# Patient Record
Sex: FEMALE | Race: WHITE | Employment: FULL TIME | URBAN - METROPOLITAN AREA
[De-identification: names, ages, dates, MRNs, and addresses within clinical notes are randomized per-mention and may not be internally consistent; named-entity substitution may affect disease eponyms.]

---

## 2017-12-08 ENCOUNTER — OFFICE VISIT (OUTPATIENT)
Dept: CARDIOLOGY CLINIC | Age: 67
End: 2017-12-08

## 2017-12-08 VITALS
HEART RATE: 80 BPM | HEIGHT: 63 IN | WEIGHT: 160.2 LBS | BODY MASS INDEX: 28.39 KG/M2 | DIASTOLIC BLOOD PRESSURE: 88 MMHG | SYSTOLIC BLOOD PRESSURE: 142 MMHG

## 2017-12-08 DIAGNOSIS — R01.1 MURMUR: Primary | ICD-10-CM

## 2017-12-08 PROCEDURE — 1090F PRES/ABSN URINE INCON ASSESS: CPT | Performed by: INTERNAL MEDICINE

## 2017-12-08 PROCEDURE — 4040F PNEUMOC VAC/ADMIN/RCVD: CPT | Performed by: INTERNAL MEDICINE

## 2017-12-08 PROCEDURE — G8400 PT W/DXA NO RESULTS DOC: HCPCS | Performed by: INTERNAL MEDICINE

## 2017-12-08 PROCEDURE — 99213 OFFICE O/P EST LOW 20 MIN: CPT | Performed by: INTERNAL MEDICINE

## 2017-12-08 PROCEDURE — 3017F COLORECTAL CA SCREEN DOC REV: CPT | Performed by: INTERNAL MEDICINE

## 2017-12-08 PROCEDURE — 1036F TOBACCO NON-USER: CPT | Performed by: INTERNAL MEDICINE

## 2017-12-08 PROCEDURE — 3014F SCREEN MAMMO DOC REV: CPT | Performed by: INTERNAL MEDICINE

## 2017-12-08 PROCEDURE — G8427 DOCREV CUR MEDS BY ELIG CLIN: HCPCS | Performed by: INTERNAL MEDICINE

## 2017-12-08 PROCEDURE — G8419 CALC BMI OUT NRM PARAM NOF/U: HCPCS | Performed by: INTERNAL MEDICINE

## 2017-12-08 PROCEDURE — 1123F ACP DISCUSS/DSCN MKR DOCD: CPT | Performed by: INTERNAL MEDICINE

## 2017-12-08 PROCEDURE — G8484 FLU IMMUNIZE NO ADMIN: HCPCS | Performed by: INTERNAL MEDICINE

## 2017-12-08 NOTE — PROGRESS NOTES
CARDIOLOGY NOTE      12/8/2017    RE: Marcello Jennings  (1950)                               TO:  Dr. Laina Wilcox MD      Thank you for involving me in taking care of your  patient Marcello Jennings, who is a  78y.o. year old      female with past medical  history of  Htn, hyperlipidimea  is  seen today Patient  during this  visit has no cardiac complains. Vitals:    12/08/17 1133   BP: (!) 142/88   Pulse:        Current Outpatient Prescriptions   Medication Sig Dispense Refill    levothyroxine (SYNTHROID) 150 MCG tablet TAKE ONE TABLET DAILY BY MOUTH  3    omeprazole (PRILOSEC) 40 MG delayed release capsule TAKE 1 CAPSULE BY ORAL ROUTE EVERY DAY BEFORE A MEAL  3    triamterene-hydrochlorothiazide (DYAZIDE) 37.5-25 MG per capsule daily.  MULTIPLE VITAMINS PO Take  by mouth daily.  latanoprost (XALATAN) 0.005 % ophthalmic solution 1 drop nightly.  timolol (TIMOPTIC) 0.5 % ophthalmic solution Place 1 drop into both eyes nightly.  levocetirizine (XYZAL) 5 MG tablet Take 5 mg by mouth nightly. No current facility-administered medications for this visit. Allergies: Macrobid [nitrofurantoin monohyd macro];  Naproxen; Codeine; Sulfamethazine; and Trimethoprim  Past Medical History:   Diagnosis Date    Allergic rhinitis     Breast cancer ([facility] [address] 11/2005, 10/2003, 1/18/2002    Depression     Family history of coronary artery disease     Fibroids 1984    heavy periods with fibroids    Frozen shoulder 12/12/08    left    GERD (gastroesophageal reflux disease)     H/O cardiovascular stress test 12/3/2015    treadmill    H/O chest x-ray 5/17/07    H/O echocardiogram 3/9/56    mild diastolic dysfunction    Hx of  Carotid Doppler ultrasound 12/20/2016    Normal echo    Hyperlipidemia     Hypertension     Hypothyroidism     VHD (valvular heart disease)     Mitral valve disease     Past Surgical History:   Procedure Laterality Date    BREAST LUMPECTOMY  , 1/18/02 left, 10/27/06    BREAST SURGERY  6/9/2006    b/l reconstruction    CHOLECYSTECTOMY  10/24/2007    HYSTERECTOMY  1984    total    MASTECTOMY  11/2005    B/L    OTHER SURGICAL HISTORY  12/12/08    arthroscopy    THYROIDECTOMY       Family History   Problem Relation Age of Onset    Stroke Mother     Heart Surgery Father 67     CABG    Heart Attack Father     Hypertension Father     High Cholesterol Father     High Cholesterol Brother     Hypertension Brother     Heart Attack Paternal Uncle     Heart Attack Maternal Grandfather     Heart Attack Paternal Uncle     Heart Attack Paternal Uncle     Heart Attack Paternal Uncle      Social History   Substance Use Topics    Smoking status: Never Smoker    Smokeless tobacco: Never Used    Alcohol use No          Review of systems:  HEENT: Neg  Card:neg   GI;Neg  : Neg  Neuro: Neg  Psych: Neg  Derm: Neg  MS; Neg  All: Documented  Constitutional: Neg    Objective:      Physical Exam:  BP (!) 142/88   Pulse 80   Ht 5' 3\" (1.6 m)   Wt 160 lb 3.2 oz (72.7 kg)   BMI 28.38 kg/m²   Wt Readings from Last 3 Encounters:   12/08/17 160 lb 3.2 oz (72.7 kg)   11/23/16 157 lb 10.1 oz (71.5 kg)   12/03/15 161 lb (73 kg)     Body mass index is 28.38 kg/m². GENERAL - Alert, oriented, pleasant, in no apparent distress. Head unremarkable  Eyes  Not injected conjunctiva  ENT  normal mucosa  Neck - Supple. No jugular venous distention noted. No carotid bruits. Cardiovascular  Normal S1 and S2 without obvious murmur or gallop. Extremities - No cyanosis, clubbing, or significant edema. Pulmonary  No respiratory distress. No wheezes or rales. Pulses: Bilateral radial and pedal pulses normal  Abdomen  no tenderness  Musculoskeletal  normal strength  Neurologic    There are  no gross focal neurologic abnormalities.   Skin-  No rash  Affect; normal mood    DATA:  No results found for: CKTOTAL, CKMB, CKMBINDEX, TROPONINI  BNP:  No results found for: BNP  PT/INR:  No results found for: PTINR  No results found for: LABA1C  Lab Results   Component Value Date    CHOL 234 07/08/2015    TRIG 269 07/08/2015    HDL 35 07/08/2015    LDLCALC 145 07/08/2015     Lab Results   Component Value Date    ALT 46 (H) 07/07/2013    AST 31 07/07/2013     TSH:    Lab Results   Component Value Date    TSH 0.61 09/13/2012         QUALITY MEASURES:  CAD:  No   CHOL LOWERING:  No- if No Why  ANTIPLATELET:  Yes and No - if No why  BETA BLOCKER    no  IF  NO WHY  SMOKING HISTORY no COUNSELLED no  ATRIAL FIBRILLATIONno ANTICOAG: no    Assessment/ Plan:     Patient seen , interviewed and examined      -  Hypertension: Patients blood pressure is normal. Patient is advised about low sodium diet. Present medical regimen will not be changed. -  LIPID MANAGEMENT:  Available lipid  lab data reviewed  and patient was given dietary advice. NCEP- ATP III guidelines reviewed with patient.     -   Changes  in medicines made: No        - VHD     stable      Mod MR yearly echo

## 2018-01-29 LAB
ALBUMIN SERPL-MCNC: 4.7 G/DL
ALP BLD-CCNC: 75 U/L
ALT SERPL-CCNC: 67 U/L
ANION GAP SERPL CALCULATED.3IONS-SCNC: NORMAL MMOL/L
AST SERPL-CCNC: 48 U/L
AVERAGE GLUCOSE: NORMAL
BILIRUB SERPL-MCNC: 0.3 MG/DL (ref 0.1–1.4)
BUN BLDV-MCNC: 14 MG/DL
CALCIUM SERPL-MCNC: 9.6 MG/DL
CHLORIDE BLD-SCNC: 101 MMOL/L
CHOLESTEROL, TOTAL: 220 MG/DL
CHOLESTEROL/HDL RATIO: NORMAL
CO2: 28 MMOL/L
CREAT SERPL-MCNC: 0.6 MG/DL
GFR CALCULATED: NORMAL
GLUCOSE BLD-MCNC: 126 MG/DL
HBA1C MFR BLD: 5.9 %
HDLC SERPL-MCNC: 35 MG/DL (ref 35–70)
LDL CHOLESTEROL CALCULATED: 126 MG/DL (ref 0–160)
POTASSIUM SERPL-SCNC: 3.8 MMOL/L
SODIUM BLD-SCNC: 143 MMOL/L
T4 FREE: 1.27
TOTAL PROTEIN: 7.3
TRIGL SERPL-MCNC: 293 MG/DL
TSH SERPL DL<=0.05 MIU/L-ACNC: 0.32 UIU/ML
VLDLC SERPL CALC-MCNC: 59 MG/DL

## 2018-02-13 ENCOUNTER — TELEPHONE (OUTPATIENT)
Dept: CARDIOLOGY CLINIC | Age: 68
End: 2018-02-13

## 2018-02-13 NOTE — TELEPHONE ENCOUNTER
Patient advised that based on her lipids, Dr. Felipe Dallas would like her to start on Pravachol 20 mg. She states that she has an opinion about statins and that she is not putting them into her body. Advised that this RN will document this in her chart and she could discuss with Dr. Felipe Dallas and Dr. Vivian Grubbs at her OV's. She voiced understanding.

## 2018-09-18 LAB
CHOLESTEROL, TOTAL: 243 MG/DL
CHOLESTEROL/HDL RATIO: NORMAL
HDLC SERPL-MCNC: 40 MG/DL (ref 35–70)
LDL CHOLESTEROL CALCULATED: 142 MG/DL (ref 0–160)
TRIGL SERPL-MCNC: 306 MG/DL
VLDLC SERPL CALC-MCNC: 61 MG/DL

## 2018-09-26 ENCOUNTER — TELEPHONE (OUTPATIENT)
Dept: CARDIOLOGY CLINIC | Age: 68
End: 2018-09-26

## 2018-09-27 ENCOUNTER — TELEPHONE (OUTPATIENT)
Dept: CARDIOLOGY CLINIC | Age: 68
End: 2018-09-27

## 2018-12-07 ENCOUNTER — OFFICE VISIT (OUTPATIENT)
Dept: CARDIOLOGY CLINIC | Age: 68
End: 2018-12-07
Payer: MEDICARE

## 2018-12-07 VITALS
BODY MASS INDEX: 26.93 KG/M2 | HEIGHT: 63 IN | DIASTOLIC BLOOD PRESSURE: 88 MMHG | SYSTOLIC BLOOD PRESSURE: 132 MMHG | WEIGHT: 152 LBS | HEART RATE: 70 BPM

## 2018-12-07 DIAGNOSIS — R94.31 ABNORMAL EKG: ICD-10-CM

## 2018-12-07 DIAGNOSIS — E78.2 MIXED HYPERLIPIDEMIA: Primary | ICD-10-CM

## 2018-12-07 DIAGNOSIS — I10 ESSENTIAL HYPERTENSION: ICD-10-CM

## 2018-12-07 PROCEDURE — G8419 CALC BMI OUT NRM PARAM NOF/U: HCPCS | Performed by: INTERNAL MEDICINE

## 2018-12-07 PROCEDURE — 99213 OFFICE O/P EST LOW 20 MIN: CPT | Performed by: INTERNAL MEDICINE

## 2018-12-07 PROCEDURE — 1101F PT FALLS ASSESS-DOCD LE1/YR: CPT | Performed by: INTERNAL MEDICINE

## 2018-12-07 PROCEDURE — 1123F ACP DISCUSS/DSCN MKR DOCD: CPT | Performed by: INTERNAL MEDICINE

## 2018-12-07 PROCEDURE — G8484 FLU IMMUNIZE NO ADMIN: HCPCS | Performed by: INTERNAL MEDICINE

## 2018-12-07 PROCEDURE — 1090F PRES/ABSN URINE INCON ASSESS: CPT | Performed by: INTERNAL MEDICINE

## 2018-12-07 PROCEDURE — 1036F TOBACCO NON-USER: CPT | Performed by: INTERNAL MEDICINE

## 2018-12-07 PROCEDURE — G8428 CUR MEDS NOT DOCUMENT: HCPCS | Performed by: INTERNAL MEDICINE

## 2018-12-07 PROCEDURE — 3017F COLORECTAL CA SCREEN DOC REV: CPT | Performed by: INTERNAL MEDICINE

## 2018-12-07 PROCEDURE — 4040F PNEUMOC VAC/ADMIN/RCVD: CPT | Performed by: INTERNAL MEDICINE

## 2018-12-07 PROCEDURE — G8400 PT W/DXA NO RESULTS DOC: HCPCS | Performed by: INTERNAL MEDICINE

## 2018-12-07 NOTE — PROGRESS NOTES
disease     Fibroids 1984    heavy periods with fibroids    Frozen shoulder 12/12/08    left    GERD (gastroesophageal reflux disease)     H/O cardiovascular stress test 12/3/2015    treadmill    H/O chest x-ray 5/17/07    H/O echocardiogram 5/6/01    mild diastolic dysfunction    Hx of  Carotid Doppler ultrasound 12/20/2016    Normal echo    Hyperlipidemia     Hypertension     Hypothyroidism     VHD (valvular heart disease)     Mitral valve disease     Past Surgical History:   Procedure Laterality Date    BREAST LUMPECTOMY  , 1/18/02    left, 10/27/06    BREAST SURGERY  6/9/2006    b/l reconstruction    CHOLECYSTECTOMY  10/24/2007    HYSTERECTOMY  1984    total    MASTECTOMY  11/2005    B/L    OTHER SURGICAL HISTORY  12/12/08    arthroscopy    THYROIDECTOMY        As reviewed   Family History   Problem Relation Age of Onset    Stroke Mother     Heart Surgery Father 67        CABG    Heart Attack Father     Hypertension Father     High Cholesterol Father     High Cholesterol Brother     Hypertension Brother     Heart Attack Paternal Uncle     Heart Attack Maternal Grandfather     Heart Attack Paternal Uncle     Heart Attack Paternal Uncle     Heart Attack Paternal Uncle      Social History   Substance Use Topics    Smoking status: Never Smoker    Smokeless tobacco: Never Used    Alcohol use No      Review of Systems:    Constitutional: Negative for diaphoresis and fatigue  Psychological:Negative for anxiety or depression  HENT: Negative for headaches, nasal congestion, sinus pain or vertigo  Eyes: Negative for visual disturbance.    Endocrine: Negative for polydipsia/polyuria  Respiratory: Negative for shortness of breath  Cardiovascular: Negative for chest pain, dyspnea on exertion, claudication, edema, irregular heartbeat, murmur, palpitations or shortness of breath  Gastrointestinal: Negative for abdominal pain or heartburn  Genito-Urinary: Negative for urinary

## 2018-12-11 ENCOUNTER — PROCEDURE VISIT (OUTPATIENT)
Dept: CARDIOLOGY CLINIC | Age: 68
End: 2018-12-11
Payer: MEDICARE

## 2018-12-11 VITALS
HEIGHT: 63 IN | BODY MASS INDEX: 26.93 KG/M2 | SYSTOLIC BLOOD PRESSURE: 136 MMHG | HEART RATE: 88 BPM | WEIGHT: 152 LBS | DIASTOLIC BLOOD PRESSURE: 78 MMHG

## 2018-12-11 DIAGNOSIS — I10 ESSENTIAL HYPERTENSION: Primary | ICD-10-CM

## 2018-12-11 DIAGNOSIS — E78.2 MIXED HYPERLIPIDEMIA: ICD-10-CM

## 2018-12-11 DIAGNOSIS — I38 VHD (VALVULAR HEART DISEASE): ICD-10-CM

## 2018-12-11 DIAGNOSIS — R94.31 ABNORMAL EKG: ICD-10-CM

## 2018-12-11 PROCEDURE — 93015 CV STRESS TEST SUPVJ I&R: CPT | Performed by: INTERNAL MEDICINE

## 2019-08-23 LAB
CHOLESTEROL, TOTAL: 211 MG/DL
CHOLESTEROL/HDL RATIO: NORMAL
HDLC SERPL-MCNC: 36 MG/DL (ref 35–70)
LDL CHOLESTEROL CALCULATED: 129 MG/DL (ref 0–160)
TRIGL SERPL-MCNC: 230 MG/DL
VLDLC SERPL CALC-MCNC: 46 MG/DL

## 2019-12-10 ENCOUNTER — OFFICE VISIT (OUTPATIENT)
Dept: CARDIOLOGY CLINIC | Age: 69
End: 2019-12-10
Payer: MEDICARE

## 2019-12-10 VITALS
BODY MASS INDEX: 25.83 KG/M2 | DIASTOLIC BLOOD PRESSURE: 78 MMHG | SYSTOLIC BLOOD PRESSURE: 130 MMHG | HEART RATE: 74 BPM | WEIGHT: 145.8 LBS | HEIGHT: 63 IN

## 2019-12-10 DIAGNOSIS — I38 VHD (VALVULAR HEART DISEASE): Primary | ICD-10-CM

## 2019-12-10 DIAGNOSIS — R07.9 CHEST PAIN, UNSPECIFIED TYPE: ICD-10-CM

## 2019-12-10 PROCEDURE — 3017F COLORECTAL CA SCREEN DOC REV: CPT | Performed by: INTERNAL MEDICINE

## 2019-12-10 PROCEDURE — G8417 CALC BMI ABV UP PARAM F/U: HCPCS | Performed by: INTERNAL MEDICINE

## 2019-12-10 PROCEDURE — 1123F ACP DISCUSS/DSCN MKR DOCD: CPT | Performed by: INTERNAL MEDICINE

## 2019-12-10 PROCEDURE — G8400 PT W/DXA NO RESULTS DOC: HCPCS | Performed by: INTERNAL MEDICINE

## 2019-12-10 PROCEDURE — 1090F PRES/ABSN URINE INCON ASSESS: CPT | Performed by: INTERNAL MEDICINE

## 2019-12-10 PROCEDURE — 99214 OFFICE O/P EST MOD 30 MIN: CPT | Performed by: INTERNAL MEDICINE

## 2019-12-10 PROCEDURE — 4040F PNEUMOC VAC/ADMIN/RCVD: CPT | Performed by: INTERNAL MEDICINE

## 2019-12-10 PROCEDURE — 1036F TOBACCO NON-USER: CPT | Performed by: INTERNAL MEDICINE

## 2019-12-10 PROCEDURE — G8484 FLU IMMUNIZE NO ADMIN: HCPCS | Performed by: INTERNAL MEDICINE

## 2019-12-10 PROCEDURE — G8427 DOCREV CUR MEDS BY ELIG CLIN: HCPCS | Performed by: INTERNAL MEDICINE

## 2019-12-10 RX ORDER — DORZOLAMIDE HCL 20 MG/ML
1 SOLUTION/ DROPS OPHTHALMIC 2 TIMES DAILY
COMMUNITY

## 2019-12-11 ENCOUNTER — TELEPHONE (OUTPATIENT)
Dept: CARDIOLOGY CLINIC | Age: 69
End: 2019-12-11

## 2019-12-12 ENCOUNTER — TELEPHONE (OUTPATIENT)
Dept: CARDIOLOGY CLINIC | Age: 69
End: 2019-12-12

## 2019-12-27 ENCOUNTER — PROCEDURE VISIT (OUTPATIENT)
Dept: CARDIOLOGY CLINIC | Age: 69
End: 2019-12-27
Payer: MEDICARE

## 2019-12-27 VITALS
HEART RATE: 73 BPM | BODY MASS INDEX: 25.69 KG/M2 | SYSTOLIC BLOOD PRESSURE: 130 MMHG | DIASTOLIC BLOOD PRESSURE: 68 MMHG | WEIGHT: 145 LBS | HEIGHT: 63 IN

## 2019-12-27 DIAGNOSIS — I38 VHD (VALVULAR HEART DISEASE): ICD-10-CM

## 2019-12-27 DIAGNOSIS — R94.31 ABNORMAL EKG: ICD-10-CM

## 2019-12-27 DIAGNOSIS — R07.9 CHEST PAIN, UNSPECIFIED TYPE: Primary | ICD-10-CM

## 2019-12-27 DIAGNOSIS — I05.9 MITRAL VALVE DISEASE: ICD-10-CM

## 2019-12-27 DIAGNOSIS — Z82.49 FAMILY HISTORY OF CORONARY ARTERY DISEASE: ICD-10-CM

## 2019-12-27 LAB
LV EF: 58 %
LVEF MODALITY: NORMAL

## 2019-12-27 PROCEDURE — 93306 TTE W/DOPPLER COMPLETE: CPT | Performed by: INTERNAL MEDICINE

## 2019-12-27 PROCEDURE — 93015 CV STRESS TEST SUPVJ I&R: CPT | Performed by: INTERNAL MEDICINE

## 2019-12-30 ENCOUNTER — TELEPHONE (OUTPATIENT)
Dept: CARDIOLOGY CLINIC | Age: 69
End: 2019-12-30

## 2019-12-30 NOTE — TELEPHONE ENCOUNTER
LV function and size are normal, Ejection Fraction 55-60 %. Normal left ventricular wall thickness. No regional wall motion abnormalities were detected. Normal diastolic filling pattern for age. All chamber dimensions are within normal limits. Mild aortic regurgitation is noted with a pressure half time of 557 msec. Mild mitral and tricuspid regurgitation is present. RVSP= 29 mmHg. No evidence of pericardial effusion. ECG portion of stress test is negative for ischemia by diagnostic criteria. EKG showed frequent PAC during stress at Peak exercise No chest pain noted   during testing. Completed 7METS and 6 Mins of exercise .    Harris Score is 6 ( LOW RISK) but heavy PAC burden seen in stress

## 2019-12-31 ENCOUNTER — TELEPHONE (OUTPATIENT)
Dept: CARDIOLOGY CLINIC | Age: 69
End: 2019-12-31

## 2019-12-31 NOTE — TELEPHONE ENCOUNTER
LM on  for patient to return call to discuss results. LV function and size are normal, Ejection Fraction 55-60 %. Normal left ventricular wall thickness. No regional wall motion abnormalities were detected. Normal diastolic filling pattern for age. All chamber dimensions are within normal limits. Mild aortic regurgitation is noted with a pressure half time of 557 msec. Mild mitral and tricuspid regurgitation is present. RVSP= 29 mmHg. No evidence of pericardial effusion. ECG portion of stress test is negative for ischemia by diagnostic criteria. EKG showed frequent PAC during stress at Peak exercise No chest pain noted   during testing. Completed 7METS and 6 Mins of exercise .    Harris Score is 6 ( LOW RISK) but heavy PAC burden seen in stress

## 2020-01-06 ENCOUNTER — TELEPHONE (OUTPATIENT)
Dept: CARDIOLOGY CLINIC | Age: 70
End: 2020-01-06

## 2020-01-06 NOTE — TELEPHONE ENCOUNTER
Advised patient of results. Patient coming in for f/u ov with Juanito, due to her having a lot of questions. Specifically about if breast cancer radiation is affecting her heart or thickening her heart muscle. Also she has questions about thoracic output syndrome. She states she has had some tingling and discoloration in her fingers, but no other symptoms. She is going to a chiropractor as well.

## 2020-01-16 ENCOUNTER — OFFICE VISIT (OUTPATIENT)
Dept: CARDIOLOGY CLINIC | Age: 70
End: 2020-01-16
Payer: MEDICARE

## 2020-01-16 VITALS
BODY MASS INDEX: 25.91 KG/M2 | HEART RATE: 76 BPM | HEIGHT: 63 IN | SYSTOLIC BLOOD PRESSURE: 132 MMHG | DIASTOLIC BLOOD PRESSURE: 84 MMHG | WEIGHT: 146.2 LBS

## 2020-01-16 PROCEDURE — G8484 FLU IMMUNIZE NO ADMIN: HCPCS | Performed by: INTERNAL MEDICINE

## 2020-01-16 PROCEDURE — 99213 OFFICE O/P EST LOW 20 MIN: CPT | Performed by: INTERNAL MEDICINE

## 2020-01-16 PROCEDURE — 3017F COLORECTAL CA SCREEN DOC REV: CPT | Performed by: INTERNAL MEDICINE

## 2020-01-16 PROCEDURE — G8417 CALC BMI ABV UP PARAM F/U: HCPCS | Performed by: INTERNAL MEDICINE

## 2020-01-16 PROCEDURE — G8400 PT W/DXA NO RESULTS DOC: HCPCS | Performed by: INTERNAL MEDICINE

## 2020-01-16 PROCEDURE — 1090F PRES/ABSN URINE INCON ASSESS: CPT | Performed by: INTERNAL MEDICINE

## 2020-01-16 PROCEDURE — 1123F ACP DISCUSS/DSCN MKR DOCD: CPT | Performed by: INTERNAL MEDICINE

## 2020-01-16 PROCEDURE — G8427 DOCREV CUR MEDS BY ELIG CLIN: HCPCS | Performed by: INTERNAL MEDICINE

## 2020-01-16 PROCEDURE — 4040F PNEUMOC VAC/ADMIN/RCVD: CPT | Performed by: INTERNAL MEDICINE

## 2020-01-16 PROCEDURE — 1036F TOBACCO NON-USER: CPT | Performed by: INTERNAL MEDICINE

## 2020-01-16 NOTE — PROGRESS NOTES
CARDIOLOGY NOTE      1/16/2020    RE: Mamadou Shipman  (1950)                               TO:  Dr. Skip Villanueva MD            Danay Gaytan is a 77 y.o. female who was seen today for management of  htn                          Here for fu on echo and ETT          HPI:   Patient is here for    - Hypertension,is  well controlled, pt is  compliant with medicines  - Hyperlipidimea, lipids are in acceptable range. Pt  is  compliant with medicines                    The patient does not have cardiac complaints  Feels tingling in LUE and lt back nonexertional, no CP, sob, for a few months. Mamadou Shipman has the following history recorded in care path:  Patient Active Problem List    Diagnosis Date Noted    Abnormal EKG 12/07/2018    Family history of coronary artery disease     GERD (gastroesophageal reflux disease)     VHD (valvular heart disease)     Hypertension     Mitral valve disease     Hyperlipidemia      Current Outpatient Medications   Medication Sig Dispense Refill    dorzolamide (TRUSOPT) 2 % ophthalmic solution Place 1 drop into both eyes 2 times daily Morning and night      NONFORMULARY Cholesterol health      NONFORMULARY Concentrated fruits and vegetables      Omega-3 Fatty Acids (FISH OIL PO) Take 1 capsule by mouth daily      levothyroxine (SYNTHROID) 150 MCG tablet TAKE ONE TABLET DAILY BY MOUTH  3    omeprazole (PRILOSEC) 40 MG delayed release capsule TAKE 1 CAPSULE BY ORAL ROUTE EVERY DAY BEFORE A MEAL  3    triamterene-hydrochlorothiazide (DYAZIDE) 37.5-25 MG per capsule daily Patient taking 1/2 daily      MULTIPLE VITAMINS PO Take  by mouth daily.  latanoprost (XALATAN) 0.005 % ophthalmic solution 1 drop nightly.  timolol (TIMOPTIC) 0.5 % ophthalmic solution Place 1 drop into both eyes nightly.  levocetirizine (XYZAL) 5 MG tablet Take 5 mg by mouth nightly. No current facility-administered medications for this visit. Allergies: Macrobid [nitrofurantoin monohyd macro];  Naproxen; Codeine; Sulfamethazine; and Trimethoprim  Past Medical History:   Diagnosis Date    Allergic rhinitis     Breast cancer ([address] 11/2005, 10/2003, 1/18/2002    Depression     Family history of coronary artery disease     Fibroids 1984    heavy periods with fibroids    Frozen shoulder 12/12/08    left    GERD (gastroesophageal reflux disease)     H/O cardiovascular stress test 12/3/2015    treadmill    H/O chest x-ray 5/17/07    H/O echocardiogram 7/6/68    mild diastolic dysfunction    History of exercise stress test 12/26/2019    treadmill  Negative for ischemia by diagnostic criteria, Frequent PAC during stress at peak exercise, no chest pain, heavy PAC burden seen in stress    Hx of  Carotid Doppler ultrasound 12/20/2016    Normal echo    Hyperlipidemia     Hypertension     Hypothyroidism     VHD (valvular heart disease)     Mitral valve disease     Past Surgical History:   Procedure Laterality Date    BREAST LUMPECTOMY  , 1/18/02    left, 10/27/06    BREAST SURGERY  6/9/2006    b/l reconstruction    CHOLECYSTECTOMY  10/24/2007    HYSTERECTOMY  1984    total    MASTECTOMY  11/2005    B/L    OTHER SURGICAL HISTORY  12/12/08    arthroscopy    THYROIDECTOMY        As reviewed   Family History   Problem Relation Age of Onset    Stroke Mother     Heart Surgery Father 67        CABG    Heart Attack Father     Hypertension Father     High Cholesterol Father     High Cholesterol Brother     Hypertension Brother     Heart Attack Paternal Uncle     Heart Attack Maternal Grandfather     Heart Attack Paternal Uncle     Heart Attack Paternal Uncle     Heart Attack Paternal Uncle      Social History     Tobacco Use    Smoking status: Never Smoker    Smokeless tobacco: Never Used   Substance Use Topics    Alcohol use: No      Review of Systems:    Constitutional: Negative for diaphoresis and fatigue  Psychological:Negative for Lab Results   Component Value Date    LABA1C 5.9 01/29/2018     Lab Results   Component Value Date    WBC 13.8 (H) 07/07/2013    HCT 49.2 (H) 07/07/2013    MCV 91.5 07/07/2013     07/07/2013     Lab Results   Component Value Date    CHOL 211 08/23/2019    TRIG 230 08/23/2019    HDL 36 08/23/2019    LDLCALC 129 08/23/2019     Lab Results   Component Value Date    ALT 67 01/29/2018    AST 48 01/29/2018     BMP:    Lab Results   Component Value Date     01/29/2018    K 3.8 01/29/2018     01/29/2018    CO2 28 01/29/2018    BUN 14 01/29/2018    CREATININE 0.6 01/29/2018     CMP:   Lab Results   Component Value Date     01/29/2018    K 3.8 01/29/2018     01/29/2018    CO2 28 01/29/2018    BUN 14 01/29/2018    PROT 9.1 07/07/2013    PROT 7.1 05/02/2012     TSH:    Lab Results   Component Value Date    TSH 0.319 01/29/2018         Impression:    No diagnosis found. Patient Active Problem List   Diagnosis Code    Hypertension I10    Mitral valve disease I05.9    Hyperlipidemia E78.5    Family history of coronary artery disease Z82.49    GERD (gastroesophageal reflux disease) K21.9    VHD (valvular heart disease) I38    Abnormal EKG R94.31       Assessment & Plan:    -  Hypertension: Patients blood pressure is normal. Patient is advised about low sodium diet. Present medical regimen will not be changed. - reecho check PAP, normal  Reviewed with pt     -  ETT for atypical symptoms, normal            -  LIPID MANAGEMENT:  Available lipid  lab data reviewed  and patient was given dietary advice. NCEP- ATP III guidelines reviewed with patient. -   Changes  in medicines made:  Daija Palacios [facility] [place]

## 2020-09-02 LAB
AVERAGE GLUCOSE: NORMAL
CHOLESTEROL, TOTAL: 223 MG/DL
CHOLESTEROL/HDL RATIO: NORMAL
HBA1C MFR BLD: 5.8 %
HDLC SERPL-MCNC: 43 MG/DL (ref 35–70)
LDL CHOLESTEROL CALCULATED: 148 MG/DL (ref 0–160)
NONHDLC SERPL-MCNC: NORMAL MG/DL
TRIGL SERPL-MCNC: 174 MG/DL
VLDLC SERPL CALC-MCNC: 32 MG/DL

## 2020-12-15 ENCOUNTER — TELEPHONE (OUTPATIENT)
Dept: CARDIOLOGY CLINIC | Age: 70
End: 2020-12-15

## 2020-12-15 NOTE — TELEPHONE ENCOUNTER
Called pt to see if pt was started on a Statin for her elevated cholesterol numbers from labs done on 9/2/2020 w/Dr. Apryl Zendejas? Per states that she has not and refused to take one when discussing w/Dr. Apryl Zendejas. Advised pt that Sobia Carrasquillo had reviewed those labs and had wanted her to start on Pravacol 20 mg 1 tab daily. Advised pt that I would let Dr. Ford Watson know and call her back to advise her on any orders. Pt verbalized understanding.

## 2020-12-29 ENCOUNTER — OFFICE VISIT (OUTPATIENT)
Dept: CARDIOLOGY CLINIC | Age: 70
End: 2020-12-29
Payer: MEDICARE

## 2020-12-29 VITALS
SYSTOLIC BLOOD PRESSURE: 138 MMHG | BODY MASS INDEX: 24.73 KG/M2 | WEIGHT: 139.6 LBS | HEART RATE: 66 BPM | HEIGHT: 63 IN | DIASTOLIC BLOOD PRESSURE: 86 MMHG

## 2020-12-29 PROCEDURE — 3017F COLORECTAL CA SCREEN DOC REV: CPT | Performed by: INTERNAL MEDICINE

## 2020-12-29 PROCEDURE — 1123F ACP DISCUSS/DSCN MKR DOCD: CPT | Performed by: INTERNAL MEDICINE

## 2020-12-29 PROCEDURE — 1090F PRES/ABSN URINE INCON ASSESS: CPT | Performed by: INTERNAL MEDICINE

## 2020-12-29 PROCEDURE — 4040F PNEUMOC VAC/ADMIN/RCVD: CPT | Performed by: INTERNAL MEDICINE

## 2020-12-29 PROCEDURE — 1036F TOBACCO NON-USER: CPT | Performed by: INTERNAL MEDICINE

## 2020-12-29 PROCEDURE — G8484 FLU IMMUNIZE NO ADMIN: HCPCS | Performed by: INTERNAL MEDICINE

## 2020-12-29 PROCEDURE — G8400 PT W/DXA NO RESULTS DOC: HCPCS | Performed by: INTERNAL MEDICINE

## 2020-12-29 PROCEDURE — G8420 CALC BMI NORM PARAMETERS: HCPCS | Performed by: INTERNAL MEDICINE

## 2020-12-29 PROCEDURE — G8427 DOCREV CUR MEDS BY ELIG CLIN: HCPCS | Performed by: INTERNAL MEDICINE

## 2020-12-29 PROCEDURE — 99213 OFFICE O/P EST LOW 20 MIN: CPT | Performed by: INTERNAL MEDICINE

## 2020-12-29 RX ORDER — LOSARTAN POTASSIUM AND HYDROCHLOROTHIAZIDE 25; 100 MG/1; MG/1
TABLET ORAL
COMMUNITY
Start: 2020-10-21 | End: 2021-12-29

## 2020-12-29 NOTE — PROGRESS NOTES
CARDIOLOGY NOTE      12/29/2020    RE: Regla Chong  (1950)                               TO:  Dr. Charo Temple MD            Lindsay Jaquez is a 72 y.o. female who was seen today for management of  htn                                    HPI:                   The patient does not have cardiac complaints  Patient also seen  for    - Hypertension,is  well controlled, pt is  compliant with medicines  - Hyperlipidimea, importance of hyperlipidimea discussed with pt. Regla Chong has the following history recorded in care path:  Patient Active Problem List    Diagnosis Date Noted    Abnormal EKG 12/07/2018    Family history of coronary artery disease     GERD (gastroesophageal reflux disease)     VHD (valvular heart disease)     Hypertension     Mitral valve disease     Hyperlipidemia      Current Outpatient Medications   Medication Sig Dispense Refill    losartan-hydroCHLOROthiazide (HYZAAR) 100-25 MG per tablet       dorzolamide (TRUSOPT) 2 % ophthalmic solution Place 1 drop into both eyes 2 times daily Morning and night      NONFORMULARY Concentrated fruits and vegetables      Omega-3 Fatty Acids (FISH OIL PO) Take 1 capsule by mouth daily      levothyroxine (SYNTHROID) 150 MCG tablet TAKE ONE TABLET DAILY BY MOUTH  3    omeprazole (PRILOSEC) 40 MG delayed release capsule TAKE 1 CAPSULE BY ORAL ROUTE EVERY DAY BEFORE A MEAL  3    MULTIPLE VITAMINS PO Take  by mouth daily.  latanoprost (XALATAN) 0.005 % ophthalmic solution 1 drop nightly.  timolol (TIMOPTIC) 0.5 % ophthalmic solution Place 1 drop into both eyes nightly.  NONFORMULARY Cholesterol health      triamterene-hydrochlorothiazide (DYAZIDE) 37.5-25 MG per capsule daily Patient taking 1/2 daily      levocetirizine (XYZAL) 5 MG tablet Take 5 mg by mouth nightly. No current facility-administered medications for this visit.       Allergies: Macrobid [nitrofurantoin monohyd macro], Naproxen, Codeine, Sulfamethazine, and Trimethoprim  Past Medical History:   Diagnosis Date    Allergic rhinitis     Breast cancer ([facility] [address] 11/2005, 10/2003, 1/18/2002    Depression     Family history of coronary artery disease     Fibroids 1984    heavy periods with fibroids    Frozen shoulder 12/12/08    left    GERD (gastroesophageal reflux disease)     H/O cardiovascular stress test 12/3/2015    treadmill    H/O chest x-ray 5/17/07    H/O echocardiogram 7/5/60    mild diastolic dysfunction    History of exercise stress test 12/26/2019    treadmill  Negative for ischemia by diagnostic criteria, Frequent PAC during stress at peak exercise, no chest pain, heavy PAC burden seen in stress    Hx of  Carotid Doppler ultrasound 12/20/2016    Normal echo    Hyperlipidemia     Hypertension     Hypothyroidism     VHD (valvular heart disease)     Mitral valve disease     Past Surgical History:   Procedure Laterality Date    BREAST LUMPECTOMY  , 1/18/02    left, 10/27/06    BREAST SURGERY  6/9/2006    b/l reconstruction    CHOLECYSTECTOMY  10/24/2007    HYSTERECTOMY  1984    total    MASTECTOMY  11/2005    B/L    OTHER SURGICAL HISTORY  12/12/08    arthroscopy    THYROIDECTOMY        As reviewed   Family History   Problem Relation Age of Onset    Stroke Mother     Heart Surgery Father 67        CABG    Heart Attack Father     Hypertension Father     High Cholesterol Father     High Cholesterol Brother     Hypertension Brother     Heart Attack Paternal Uncle     Heart Attack Maternal Grandfather     Heart Attack Paternal Uncle     Heart Attack Paternal Uncle     Heart Attack Paternal Uncle      Social History     Tobacco Use    Smoking status: Never Smoker    Smokeless tobacco: Never Used   Substance Use Topics    Alcohol use: No     Comment: choloate 5x a week       Review of Systems:    Constitutional: Negative for diaphoresis and fatigue  Psychological:Negative for anxiety or depression  HENT: Negative for headaches, nasal congestion, sinus pain or vertigo  Eyes: Negative for visual disturbance. Endocrine: Negative for polydipsia/polyuria  Respiratory: Negative for shortness of breath  Cardiovascular: Negative for chest pain, dyspnea on exertion, claudication, edema, irregular heartbeat, murmur, palpitations or shortness of breath  Gastrointestinal: Negative for abdominal pain or heartburn  Genito-Urinary: Negative for urinary frequency/urgency  Musculoskeletal: Negative for muscle pain, muscular weakness, negative for pain in arm and leg or swelling in foot and leg  Neurological: Negative for dizziness, headaches, memory loss, numbness/tingling, visual changes, syncope  Dermatological: Negative for rash    Objective:    Vitals:    12/29/20 1432   BP: 138/86   Pulse: 66   Weight: 139 lb 9.6 oz (63.3 kg)   Height: 5' 3\" (1.6 m)     /86   Pulse 66   Ht 5' 3\" (1.6 m)   Wt 139 lb 9.6 oz (63.3 kg)   BMI 24.73 kg/m²     No flowsheet data found. Wt Readings from Last 3 Encounters:   12/29/20 139 lb 9.6 oz (63.3 kg)   01/16/20 146 lb 3.2 oz (66.3 kg)   12/27/19 145 lb (65.8 kg)     Body mass index is 24.73 kg/m². GENERAL - Alert, oriented, pleasant, in no apparent distress. EYES: No jaundice, no conjunctival pallor. SKIN: It is warm & dry. No rashes. No Echhymosis    HEENT - No clinically significant abnormalities seen. Neck - Supple. No jugular venous distention noted. No carotid bruits. Cardiovascular - Normal S1 and S2 without obvious murmur or gallop. Extremities - No cyanosis, clubbing, or significant edema. Pulmonary - No respiratory distress. No wheezes or rales. Abdomen - No masses, tenderness, or organomegaly. Musculoskeletal - No significant edema. No joint deformities. No muscle wasting. Neurologic - Cranial nerves II through XII are grossly intact. There were no gross focal neurologic abnormalities.     Lab Review   No results found for: CKTOTAL,

## 2021-11-24 LAB
ALBUMIN SERPL-MCNC: 4.4 G/DL
ALP BLD-CCNC: 78 U/L
ALT SERPL-CCNC: 8 U/L
ANION GAP SERPL CALCULATED.3IONS-SCNC: NORMAL MMOL/L
AST SERPL-CCNC: 20 U/L
AVERAGE GLUCOSE: NORMAL
BILIRUB SERPL-MCNC: 0.4 MG/DL (ref 0.1–1.4)
BUN BLDV-MCNC: 17 MG/DL
CALCIUM SERPL-MCNC: 9.5 MG/DL
CHLORIDE BLD-SCNC: 106 MMOL/L
CHOLESTEROL, TOTAL: 225 MG/DL
CHOLESTEROL/HDL RATIO: NORMAL
CO2: 22 MMOL/L
CREAT SERPL-MCNC: 0.57 MG/DL
GFR CALCULATED: 94
GLUCOSE BLD-MCNC: 98 MG/DL
HBA1C MFR BLD: 5.5 %
HDLC SERPL-MCNC: 51 MG/DL (ref 35–70)
LDL CHOLESTEROL CALCULATED: 147 MG/DL (ref 0–160)
NONHDLC SERPL-MCNC: NORMAL MG/DL
POTASSIUM SERPL-SCNC: 4.4 MMOL/L
SODIUM BLD-SCNC: 142 MMOL/L
T4 FREE: 8.9
TOTAL PROTEIN: 7
TRIGL SERPL-MCNC: 152 MG/DL
TSH SERPL DL<=0.05 MIU/L-ACNC: 0.02 UIU/ML
VLDLC SERPL CALC-MCNC: 27 MG/DL

## 2021-12-29 ENCOUNTER — OFFICE VISIT (OUTPATIENT)
Dept: CARDIOLOGY CLINIC | Age: 71
End: 2021-12-29
Payer: MEDICARE

## 2021-12-29 VITALS
WEIGHT: 125.6 LBS | HEART RATE: 69 BPM | HEIGHT: 63 IN | DIASTOLIC BLOOD PRESSURE: 82 MMHG | BODY MASS INDEX: 22.25 KG/M2 | SYSTOLIC BLOOD PRESSURE: 130 MMHG

## 2021-12-29 DIAGNOSIS — I38 VHD (VALVULAR HEART DISEASE): Primary | ICD-10-CM

## 2021-12-29 PROCEDURE — 3017F COLORECTAL CA SCREEN DOC REV: CPT | Performed by: NURSE PRACTITIONER

## 2021-12-29 PROCEDURE — G8400 PT W/DXA NO RESULTS DOC: HCPCS | Performed by: NURSE PRACTITIONER

## 2021-12-29 PROCEDURE — 1090F PRES/ABSN URINE INCON ASSESS: CPT | Performed by: NURSE PRACTITIONER

## 2021-12-29 PROCEDURE — G8420 CALC BMI NORM PARAMETERS: HCPCS | Performed by: NURSE PRACTITIONER

## 2021-12-29 PROCEDURE — G8484 FLU IMMUNIZE NO ADMIN: HCPCS | Performed by: NURSE PRACTITIONER

## 2021-12-29 PROCEDURE — 1123F ACP DISCUSS/DSCN MKR DOCD: CPT | Performed by: NURSE PRACTITIONER

## 2021-12-29 PROCEDURE — 4040F PNEUMOC VAC/ADMIN/RCVD: CPT | Performed by: NURSE PRACTITIONER

## 2021-12-29 PROCEDURE — 99213 OFFICE O/P EST LOW 20 MIN: CPT | Performed by: NURSE PRACTITIONER

## 2021-12-29 PROCEDURE — 93000 ELECTROCARDIOGRAM COMPLETE: CPT | Performed by: NURSE PRACTITIONER

## 2021-12-29 PROCEDURE — G8427 DOCREV CUR MEDS BY ELIG CLIN: HCPCS | Performed by: NURSE PRACTITIONER

## 2021-12-29 PROCEDURE — 1036F TOBACCO NON-USER: CPT | Performed by: NURSE PRACTITIONER

## 2021-12-29 RX ORDER — FAMOTIDINE 40 MG/1
40 TABLET, FILM COATED ORAL
COMMUNITY
Start: 2021-11-02 | End: 2022-11-02

## 2021-12-29 ASSESSMENT — ENCOUNTER SYMPTOMS
ORTHOPNEA: 0
SHORTNESS OF BREATH: 0

## 2022-11-08 ENCOUNTER — TELEPHONE (OUTPATIENT)
Dept: CARDIOLOGY CLINIC | Age: 72
End: 2022-11-08

## 2022-11-09 NOTE — TELEPHONE ENCOUNTER
Patient denies symptom. She is breast cancer survivor and would like regular testing.  Will discuss at next OV

## 2022-12-07 ENCOUNTER — OFFICE VISIT (OUTPATIENT)
Dept: CARDIOLOGY CLINIC | Age: 72
End: 2022-12-07
Payer: MEDICARE

## 2022-12-07 VITALS
SYSTOLIC BLOOD PRESSURE: 134 MMHG | HEIGHT: 63 IN | BODY MASS INDEX: 23.64 KG/M2 | WEIGHT: 133.4 LBS | OXYGEN SATURATION: 96 % | DIASTOLIC BLOOD PRESSURE: 78 MMHG | HEART RATE: 83 BPM

## 2022-12-07 DIAGNOSIS — I38 VHD (VALVULAR HEART DISEASE): Primary | ICD-10-CM

## 2022-12-07 DIAGNOSIS — I10 ESSENTIAL HYPERTENSION: ICD-10-CM

## 2022-12-07 PROCEDURE — 1090F PRES/ABSN URINE INCON ASSESS: CPT | Performed by: NURSE PRACTITIONER

## 2022-12-07 PROCEDURE — G8484 FLU IMMUNIZE NO ADMIN: HCPCS | Performed by: NURSE PRACTITIONER

## 2022-12-07 PROCEDURE — G8400 PT W/DXA NO RESULTS DOC: HCPCS | Performed by: NURSE PRACTITIONER

## 2022-12-07 PROCEDURE — 93000 ELECTROCARDIOGRAM COMPLETE: CPT | Performed by: NURSE PRACTITIONER

## 2022-12-07 PROCEDURE — 3017F COLORECTAL CA SCREEN DOC REV: CPT | Performed by: NURSE PRACTITIONER

## 2022-12-07 PROCEDURE — 3078F DIAST BP <80 MM HG: CPT | Performed by: NURSE PRACTITIONER

## 2022-12-07 PROCEDURE — 1036F TOBACCO NON-USER: CPT | Performed by: NURSE PRACTITIONER

## 2022-12-07 PROCEDURE — 99214 OFFICE O/P EST MOD 30 MIN: CPT | Performed by: NURSE PRACTITIONER

## 2022-12-07 PROCEDURE — 1123F ACP DISCUSS/DSCN MKR DOCD: CPT | Performed by: NURSE PRACTITIONER

## 2022-12-07 PROCEDURE — G8420 CALC BMI NORM PARAMETERS: HCPCS | Performed by: NURSE PRACTITIONER

## 2022-12-07 PROCEDURE — 3074F SYST BP LT 130 MM HG: CPT | Performed by: NURSE PRACTITIONER

## 2022-12-07 PROCEDURE — G8427 DOCREV CUR MEDS BY ELIG CLIN: HCPCS | Performed by: NURSE PRACTITIONER

## 2022-12-07 ASSESSMENT — ENCOUNTER SYMPTOMS
ORTHOPNEA: 0
SHORTNESS OF BREATH: 0

## 2022-12-07 NOTE — PROGRESS NOTES
12/7/2022  Primary cardiologist: Dr. Guru Hairston:   Arnulfo Booth  is an established 71 y.o.  female here for a 12 month follow up on valvular disease hypertension,  12    SUBJECTIVE/OBJECTIVE:  Arnulfo Booth is a 71 y.o. female with a history of hypertension hyperlipidemia, carcinoma of the breast, hypothyroid and GERD. HPI :   Arnulfo Booth reports overall she is feeling well. Has had no episodes of chest pain, shortness of breath or palpitations    Review of Systems   Constitutional: Negative for diaphoresis and malaise/fatigue. Cardiovascular:  Negative for chest pain, claudication, dyspnea on exertion, irregular heartbeat, leg swelling, near-syncope, orthopnea, palpitations and paroxysmal nocturnal dyspnea. Respiratory:  Negative for shortness of breath. Neurological:  Negative for dizziness and light-headedness. Vitals:    12/07/22 1543   BP: 134/78   Site: Left Upper Arm   Position: Sitting   Cuff Size: Medium Adult   Pulse: 83   SpO2: 96%   Weight: 133 lb 6.4 oz (60.5 kg)   Height: 5' 3\" (1.6 m)     No flowsheet data found. Wt Readings from Last 3 Encounters:   12/07/22 133 lb 6.4 oz (60.5 kg)   12/29/21 125 lb 9.6 oz (57 kg)   12/29/20 139 lb 9.6 oz (63.3 kg)     Body mass index is 23.63 kg/m². Physical Exam  Vitals reviewed. Constitutional:       Appearance: Normal appearance. HENT:      Head: Normocephalic and atraumatic. Mouth/Throat:      Mouth: Mucous membranes are moist.   Eyes:      Extraocular Movements: Extraocular movements intact. Pupils: Pupils are equal, round, and reactive to light. Neck:      Vascular: No carotid bruit. Cardiovascular:      Rate and Rhythm: Normal rate and regular rhythm. Pulses: Normal pulses. Pulmonary:      Effort: Pulmonary effort is normal.      Breath sounds: Normal breath sounds. No rales. Chest:      Chest wall: No tenderness. Abdominal:      General: There is no distension. Palpations: Abdomen is soft. Tenderness:  There is no abdominal tenderness. Musculoskeletal:      Cervical back: No tenderness. Right lower leg: No edema. Left lower leg: No edema. Skin:     General: Skin is warm and dry. Capillary Refill: Capillary refill takes less than 2 seconds. Neurological:      Mental Status: She is alert and oriented to person, place, and time. Psychiatric:         Mood and Affect: Mood normal.         Behavior: Behavior normal.              Current Outpatient Medications   Medication Sig Dispense Refill    Omega-3 Fatty Acids (OMEGA 3 500 PO) Take by mouth daily      famotidine (PEPCID) 40 MG tablet Take 40 mg by mouth      dorzolamide (TRUSOPT) 2 % ophthalmic solution Place 1 drop into both eyes 2 times daily Morning and night      levothyroxine (SYNTHROID) 150 MCG tablet TAKE ONE TABLET DAILY BY MOUTH  3    timolol (TIMOPTIC) 0.5 % ophthalmic solution Place 1 drop into both eyes nightly. latanoprost (XALATAN) 0.005 % ophthalmic solution 1 drop nightly. (Patient not taking: Reported on 12/7/2022)       No current facility-administered medications for this visit. All pertinent data reviewed and discussed with patient       ASSESSMENT/PLAN:    Valvular disease  Echocardiogram with mild AI mild MR   remains asymptomatic  Continue ongoing surveillance    Hypertension  Blood pressure is well controlled. She has been able to come off of her antihypertensives secondary to weight loss  Encouraged a low-sodium diet    Hyperlipidemia  Lipids noted from care everywhere from May 2022  Total cholesterol 202, triglycerides 146, HDL 48,   Encouraged to continue with weight loss for resector modification.   Encouraged ongoing healthy eating and follow-up cholesterol diet along with avoidance of red meat  On omega 3  If  at recheck and LDL not less than 100,  can consider addition of moderate density statin for cardiovascular disease prevention    Tests ordered:  none  Follow-up  1 year      Signed:  Salome Oakley DARSHAN Long - CNP, 12/7/2022, 3:54 PM    An electronic signature was used to authenticate this note. Please note this report has been partially produced using speech recognition software and may contain errors related to that system including errors in grammar, punctuation, and spelling, as well as words and phrases that may be inappropriate. If there are any questions or concerns please feel free to contact the dictating provider for clarification.

## 2022-12-07 NOTE — PATIENT INSTRUCTIONS
Please be informed that if you contact our office outside of normal business hours the physician on call cannot help with any scheduling or rescheduling issues, procedure instruction questions or any type of medication issue. We advise you for any urgent/emergency that you go to the nearest emergency room! PLEASE CALL OUR OFFICE DURING NORMAL BUSINESS HOURS    Monday - Friday   8 am to 5 pm    [place]: [phone]    [place]:  [phone]    **It is YOUR responsibilty to bring medication bottles and/or updated medication list to [address] [place] [address]. This will allow us to better serve you and all your healthcare needs**    Thank you for allowing us to care for you today! We want to ensure we can follow your treatment plan and we strive to give you the best outcomes and experience possible. If you ever have a life threatening emergency and call 911 - for an ambulance (EMS)   Our providers can only care for you at:   [facility] or [facility]. Even if you have someone take you or you drive yourself we can only care for you in a [address] [facility] facility. Our providers are not setup at the other healthcare locations!

## 2023-12-04 ENCOUNTER — OFFICE VISIT (OUTPATIENT)
Dept: CARDIOLOGY CLINIC | Age: 73
End: 2023-12-04
Payer: MEDICARE

## 2023-12-04 VITALS
BODY MASS INDEX: 26.13 KG/M2 | WEIGHT: 142 LBS | HEIGHT: 62 IN | DIASTOLIC BLOOD PRESSURE: 78 MMHG | HEART RATE: 78 BPM | SYSTOLIC BLOOD PRESSURE: 130 MMHG

## 2023-12-04 DIAGNOSIS — I38 VHD (VALVULAR HEART DISEASE): ICD-10-CM

## 2023-12-04 PROCEDURE — 1036F TOBACCO NON-USER: CPT | Performed by: NURSE PRACTITIONER

## 2023-12-04 PROCEDURE — 1123F ACP DISCUSS/DSCN MKR DOCD: CPT | Performed by: NURSE PRACTITIONER

## 2023-12-04 PROCEDURE — 3078F DIAST BP <80 MM HG: CPT | Performed by: NURSE PRACTITIONER

## 2023-12-04 PROCEDURE — 93000 ELECTROCARDIOGRAM COMPLETE: CPT | Performed by: NURSE PRACTITIONER

## 2023-12-04 PROCEDURE — 1090F PRES/ABSN URINE INCON ASSESS: CPT | Performed by: NURSE PRACTITIONER

## 2023-12-04 PROCEDURE — G8400 PT W/DXA NO RESULTS DOC: HCPCS | Performed by: NURSE PRACTITIONER

## 2023-12-04 PROCEDURE — G8428 CUR MEDS NOT DOCUMENT: HCPCS | Performed by: NURSE PRACTITIONER

## 2023-12-04 PROCEDURE — 99213 OFFICE O/P EST LOW 20 MIN: CPT | Performed by: NURSE PRACTITIONER

## 2023-12-04 PROCEDURE — 3075F SYST BP GE 130 - 139MM HG: CPT | Performed by: NURSE PRACTITIONER

## 2023-12-04 PROCEDURE — G8419 CALC BMI OUT NRM PARAM NOF/U: HCPCS | Performed by: NURSE PRACTITIONER

## 2023-12-04 PROCEDURE — G8484 FLU IMMUNIZE NO ADMIN: HCPCS | Performed by: NURSE PRACTITIONER

## 2023-12-04 PROCEDURE — 3017F COLORECTAL CA SCREEN DOC REV: CPT | Performed by: NURSE PRACTITIONER

## 2023-12-04 RX ORDER — TOLTERODINE 4 MG/1
4 CAPSULE, EXTENDED RELEASE ORAL DAILY
COMMUNITY

## 2023-12-04 RX ORDER — DIAPER,BRIEF,INFANT-TODD,DISP
EACH MISCELLANEOUS 2 TIMES DAILY
COMMUNITY

## 2023-12-04 ASSESSMENT — ENCOUNTER SYMPTOMS
ORTHOPNEA: 0
SHORTNESS OF BREATH: 0

## 2023-12-04 NOTE — PATIENT INSTRUCTIONS
We are committed to providing you the best care possible. If you receive a survey after visiting one of our offices, please take time to share your experience concerning your physician office visit. These surveys are confidential and no health information about you is shared. We are eager to improve for you and we are counting on your feedback to help make that happen. **It is YOUR responsibilty to bring medication bottles and/or updated medication list to [address] [place] [address]. This will allow us to better serve you and all your healthcare needs**  Thank you for allowing us to care for you today! We want to ensure we can follow your treatment plan and we strive to give you the best outcomes and experience possible. If you ever have a life threatening emergency and call 911 - for an ambulance (EMS)   Our providers can only care for you at:   [facility] or [facility]. Even if you have someone take you or you drive yourself we can only care for you in a [address] [place] [address] facility. Our providers are not setup at the other healthcare locations! Please be informed that if you contact our office outside of normal business hours the physician on call cannot help with any scheduling or rescheduling issues, procedure instruction questions or any type of medication issue. We advise you for any urgent/emergency that you go to the nearest emergency room!     PLEASE CALL OUR OFFICE DURING NORMAL BUSINESS HOURS    Monday - Friday   8 am to 5 pm    [place]: [address] [place]: [phone]    [place]:  [phone]

## 2023-12-14 ENCOUNTER — TELEPHONE (OUTPATIENT)
Dept: CARDIOLOGY CLINIC | Age: 73
End: 2023-12-14

## 2023-12-14 NOTE — TELEPHONE ENCOUNTER
Called patient to provide her with results. Patient verbalized understanding and confirmed next appt. Left Ventricle: Normal left ventricular systolic function with a visually estimated EF of 55 - 60%. Left ventricle size is normal. Mildly increased wall thickness. Normal wall motion. Grade I diastolic dysfunction with normal LAP. Aortic Valve: Mildly thickened cusp. Mild regurgitation. AV PHT is 528.0 ms. Vena contracta measures 0.3 cm. Mitral Valve: Annular calcification of the mitral valve. Mild regurgitation. Tricuspid Valve: Mild regurgitation. The estimated RVSP is 28 mmHg. Pericardium: No pericardial effusion. Image quality is fair. Compared to previous echo on 12/2019, no significant changes noted.

## 2024-01-08 ENCOUNTER — OFFICE VISIT (OUTPATIENT)
Dept: CARDIOLOGY CLINIC | Age: 74
End: 2024-01-08
Payer: MEDICARE

## 2024-01-08 VITALS
WEIGHT: 143.8 LBS | HEIGHT: 63 IN | RESPIRATION RATE: 16 BRPM | OXYGEN SATURATION: 98 % | BODY MASS INDEX: 25.48 KG/M2 | DIASTOLIC BLOOD PRESSURE: 84 MMHG | SYSTOLIC BLOOD PRESSURE: 162 MMHG | HEART RATE: 77 BPM

## 2024-01-08 DIAGNOSIS — I05.9 MITRAL VALVE DISEASE: Primary | ICD-10-CM

## 2024-01-08 PROCEDURE — G8400 PT W/DXA NO RESULTS DOC: HCPCS | Performed by: INTERNAL MEDICINE

## 2024-01-08 PROCEDURE — 1036F TOBACCO NON-USER: CPT | Performed by: INTERNAL MEDICINE

## 2024-01-08 PROCEDURE — 3079F DIAST BP 80-89 MM HG: CPT | Performed by: INTERNAL MEDICINE

## 2024-01-08 PROCEDURE — 99214 OFFICE O/P EST MOD 30 MIN: CPT | Performed by: INTERNAL MEDICINE

## 2024-01-08 PROCEDURE — G8427 DOCREV CUR MEDS BY ELIG CLIN: HCPCS | Performed by: INTERNAL MEDICINE

## 2024-01-08 PROCEDURE — 1090F PRES/ABSN URINE INCON ASSESS: CPT | Performed by: INTERNAL MEDICINE

## 2024-01-08 PROCEDURE — 1123F ACP DISCUSS/DSCN MKR DOCD: CPT | Performed by: INTERNAL MEDICINE

## 2024-01-08 PROCEDURE — 3077F SYST BP >= 140 MM HG: CPT | Performed by: INTERNAL MEDICINE

## 2024-01-08 PROCEDURE — G8484 FLU IMMUNIZE NO ADMIN: HCPCS | Performed by: INTERNAL MEDICINE

## 2024-01-08 PROCEDURE — G8419 CALC BMI OUT NRM PARAM NOF/U: HCPCS | Performed by: INTERNAL MEDICINE

## 2024-01-08 PROCEDURE — 3017F COLORECTAL CA SCREEN DOC REV: CPT | Performed by: INTERNAL MEDICINE

## 2024-01-08 NOTE — PROGRESS NOTES
CARDIOLOGY NOTE      1/8/2024    RE: Rachel Mcdaniel  (1950)                               TO:  Arnoldo Ennis MD            CHIEF COMPLAINT   Rachel is a 73 y.o. female who was seen today for management of  htn                                  Here for follow-up on the echocardiogram  HPI:                   The patient does not have cardiac complaints  Patient also seen  for    - Hypertension,is  well controlled, pt is  compliant with medicines  - Hyperlipidimea, importance of hyperlipidimea discussed with pt.       Rachel Mcdaniel has the following history recorded in care path:  Patient Active Problem List    Diagnosis Date Noted    Abnormal EKG 12/07/2018    Family history of coronary artery disease     GERD (gastroesophageal reflux disease)     VHD (valvular heart disease)     Hypertension     Mitral valve disease     Hyperlipidemia      Current Outpatient Medications   Medication Sig Dispense Refill    tolterodine (DETROL LA) 4 MG extended release capsule Take 1 capsule by mouth daily      hydrocortisone 1 % cream Apply topically 2 times daily Apply topically 2 times daily.      Omega-3 Fatty Acids (OMEGA 3 500 PO) Take by mouth daily      dorzolamide (TRUSOPT) 2 % ophthalmic solution Place 1 drop into both eyes 2 times daily Morning and night      levothyroxine (SYNTHROID) 150 MCG tablet TAKE ONE TABLET DAILY BY MOUTH  3    latanoprost (XALATAN) 0.005 % ophthalmic solution 1 drop nightly      timolol (TIMOPTIC) 0.5 % ophthalmic solution Place 1 drop into both eyes nightly      FEXOFENADINE HCL PO Take by mouth (Patient not taking: Reported on 1/8/2024)      Multiple Vitamins-Minerals (PRESERVISION AREDS PO) Take by mouth (Patient not taking: Reported on 1/8/2024)      famotidine (PEPCID) 40 MG tablet Take 40 mg by mouth (Patient not taking: Reported on 1/8/2024)       No current facility-administered medications for this visit.     Allergies: Macrobid [nitrofurantoin monohyd macro],

## 2024-04-10 NOTE — PROGRESS NOTES
Detail Level: Detailed
eyes 2 times daily Morning and night      levothyroxine (SYNTHROID) 150 MCG tablet TAKE ONE TABLET DAILY BY MOUTH  3    latanoprost (XALATAN) 0.005 % ophthalmic solution 1 drop nightly.  timolol (TIMOPTIC) 0.5 % ophthalmic solution Place 1 drop into both eyes nightly.  levocetirizine (XYZAL) 5 MG tablet Take 5 mg by mouth nightly. No current facility-administered medications for this visit. All pertinent data reviewed and discussed with patient       ASSESSMENT/PLAN:    Hypertension   Blood pressure is Controlled  Encouraged a low sodium diet  Now off bp medications     Dyslipidemia   Results for Annamaria Samayoa (MRN [id number])   Ref. Range 11/24/2021 00:00   Cholesterol, Total Latest Units: mg/dL 225   HDL Cholesterol Latest Ref Range: 35 - 70 mg/dL 51   LDL Calculated Latest Ref Range: 0 - 160 mg/dL 147   Triglycerides Latest Units: mg/dL 152   VLDL Latest Units: mg/dL 27     HDL is high     Encouraged to continue with  healthy eating habits including low fat -low cholesterol diet: avoid red meat- add organized exercise       EKG SR rate 69     Medications reviewed and confirmed with patient     Tests ordered:  none      Follow-up  1 year      Signed:  DARSHAN Delvalle CNP, 12/29/2021, 10:44 AM    An electronic signature was used to authenticate this note. Please note this report has been partially produced using speech recognition software and may contain errors related to that system including errors in grammar, punctuation, and spelling, as well as words and phrases that may be inappropriate. If there are any questions or concerns please feel free to contact the dictating provider for clarification.

## 2024-12-10 ENCOUNTER — OFFICE VISIT (OUTPATIENT)
Dept: CARDIOLOGY CLINIC | Age: 74
End: 2024-12-10
Payer: MEDICARE

## 2024-12-10 VITALS
WEIGHT: 141.6 LBS | SYSTOLIC BLOOD PRESSURE: 126 MMHG | DIASTOLIC BLOOD PRESSURE: 86 MMHG | HEART RATE: 73 BPM | HEIGHT: 63 IN | BODY MASS INDEX: 25.09 KG/M2

## 2024-12-10 DIAGNOSIS — I38 VHD (VALVULAR HEART DISEASE): Primary | ICD-10-CM

## 2024-12-10 DIAGNOSIS — I10 PRIMARY HYPERTENSION: ICD-10-CM

## 2024-12-10 PROCEDURE — 1090F PRES/ABSN URINE INCON ASSESS: CPT | Performed by: NURSE PRACTITIONER

## 2024-12-10 PROCEDURE — 1036F TOBACCO NON-USER: CPT | Performed by: NURSE PRACTITIONER

## 2024-12-10 PROCEDURE — 99212 OFFICE O/P EST SF 10 MIN: CPT | Performed by: NURSE PRACTITIONER

## 2024-12-10 PROCEDURE — 3079F DIAST BP 80-89 MM HG: CPT | Performed by: NURSE PRACTITIONER

## 2024-12-10 PROCEDURE — 3017F COLORECTAL CA SCREEN DOC REV: CPT | Performed by: NURSE PRACTITIONER

## 2024-12-10 PROCEDURE — G8427 DOCREV CUR MEDS BY ELIG CLIN: HCPCS | Performed by: NURSE PRACTITIONER

## 2024-12-10 PROCEDURE — 3074F SYST BP LT 130 MM HG: CPT | Performed by: NURSE PRACTITIONER

## 2024-12-10 PROCEDURE — 1123F ACP DISCUSS/DSCN MKR DOCD: CPT | Performed by: NURSE PRACTITIONER

## 2024-12-10 PROCEDURE — 93000 ELECTROCARDIOGRAM COMPLETE: CPT | Performed by: NURSE PRACTITIONER

## 2024-12-10 PROCEDURE — G8419 CALC BMI OUT NRM PARAM NOF/U: HCPCS | Performed by: NURSE PRACTITIONER

## 2024-12-10 PROCEDURE — 1159F MED LIST DOCD IN RCRD: CPT | Performed by: NURSE PRACTITIONER

## 2024-12-10 PROCEDURE — G8400 PT W/DXA NO RESULTS DOC: HCPCS | Performed by: NURSE PRACTITIONER

## 2024-12-10 PROCEDURE — G8484 FLU IMMUNIZE NO ADMIN: HCPCS | Performed by: NURSE PRACTITIONER

## 2024-12-10 RX ORDER — BRIMONIDINE TARTRATE AND TIMOLOL MALEATE 2; 5 MG/ML; MG/ML
SOLUTION OPHTHALMIC
COMMUNITY
Start: 2024-11-26

## 2024-12-10 ASSESSMENT — ENCOUNTER SYMPTOMS
SHORTNESS OF BREATH: 0
ORTHOPNEA: 0

## 2024-12-10 NOTE — PATIENT INSTRUCTIONS
Please be informed that if you contact our office outside of normal business hours the physician on call cannot help with any scheduling or rescheduling issues, procedure instruction questions or any type of medication issue.    We advise you for any urgent/emergency that you go to the nearest emergency room!    PLEASE CALL OUR OFFICE DURING NORMAL BUSINESS HOURS    Monday - Friday   8 am to 5 pm    [place]: [phone]    [place]: [phone]    [place]:  [phone]    **It is YOUR responsibilty to bring medication bottles and/or updated medication list to EACH APPOINTMENT. This will allow us to better serve you and all your healthcare needs**    Thank you for allowing us to care for you today!   We want to ensure we can follow your treatment plan and we strive to give you the best outcomes and experience possible.   If you ever have a life threatening emergency and call 911 - for an ambulance (EMS)   Our providers can only care for you at:   [facility] or [facility].   Even if you have someone take you or you drive yourself we can only care for you in a [facility] facility. Our providers are not setup at the other healthcare locations!

## 2025-01-02 ENCOUNTER — TELEPHONE (OUTPATIENT)
Dept: CARDIOLOGY CLINIC | Age: 75
End: 2025-01-02

## 2025-01-09 ENCOUNTER — OFFICE VISIT (OUTPATIENT)
Dept: CARDIOLOGY CLINIC | Age: 75
End: 2025-01-09
Payer: MEDICARE

## 2025-01-09 VITALS
BODY MASS INDEX: 25.23 KG/M2 | WEIGHT: 142.4 LBS | HEIGHT: 63 IN | DIASTOLIC BLOOD PRESSURE: 88 MMHG | SYSTOLIC BLOOD PRESSURE: 138 MMHG | HEART RATE: 82 BPM

## 2025-01-09 DIAGNOSIS — R07.89 OTHER CHEST PAIN: ICD-10-CM

## 2025-01-09 DIAGNOSIS — I10 PRIMARY HYPERTENSION: Primary | ICD-10-CM

## 2025-01-09 DIAGNOSIS — R07.9 CHEST PAIN, UNSPECIFIED TYPE: ICD-10-CM

## 2025-01-09 PROCEDURE — 3075F SYST BP GE 130 - 139MM HG: CPT | Performed by: NURSE PRACTITIONER

## 2025-01-09 PROCEDURE — 3017F COLORECTAL CA SCREEN DOC REV: CPT | Performed by: NURSE PRACTITIONER

## 2025-01-09 PROCEDURE — G8400 PT W/DXA NO RESULTS DOC: HCPCS | Performed by: NURSE PRACTITIONER

## 2025-01-09 PROCEDURE — 1036F TOBACCO NON-USER: CPT | Performed by: NURSE PRACTITIONER

## 2025-01-09 PROCEDURE — 1090F PRES/ABSN URINE INCON ASSESS: CPT | Performed by: NURSE PRACTITIONER

## 2025-01-09 PROCEDURE — 3079F DIAST BP 80-89 MM HG: CPT | Performed by: NURSE PRACTITIONER

## 2025-01-09 PROCEDURE — 93000 ELECTROCARDIOGRAM COMPLETE: CPT | Performed by: NURSE PRACTITIONER

## 2025-01-09 PROCEDURE — 99213 OFFICE O/P EST LOW 20 MIN: CPT | Performed by: NURSE PRACTITIONER

## 2025-01-09 PROCEDURE — 1123F ACP DISCUSS/DSCN MKR DOCD: CPT | Performed by: NURSE PRACTITIONER

## 2025-01-09 PROCEDURE — 1159F MED LIST DOCD IN RCRD: CPT | Performed by: NURSE PRACTITIONER

## 2025-01-09 PROCEDURE — G8427 DOCREV CUR MEDS BY ELIG CLIN: HCPCS | Performed by: NURSE PRACTITIONER

## 2025-01-09 PROCEDURE — G8419 CALC BMI OUT NRM PARAM NOF/U: HCPCS | Performed by: NURSE PRACTITIONER

## 2025-01-09 ASSESSMENT — ENCOUNTER SYMPTOMS
SHORTNESS OF BREATH: 0
ORTHOPNEA: 0

## 2025-01-09 NOTE — PATIENT INSTRUCTIONS
**It is YOUR responsibilty to bring medication bottles and/or updated medication list to EACH APPOINTMENT. This will allow us to better serve you and all your healthcare needs**  Thank you for allowing us to care for you today!   We want to ensure we can follow your treatment plan and we strive to give you the best outcomes and experience possible.   If you ever have a life threatening emergency and call 911 - for an ambulance (EMS)   Our providers can only care for you at:   [facility] or [facility].   Even if you have someone take you or you drive yourself we can only care for you in a [facility]. Our providers are not setup at the other healthcare locations!   Please be informed that if you contact our office outside of normal business hours the physician on call cannot help with any scheduling or rescheduling issues, procedure instruction questions or any type of medication issue.    We advise you for any urgent/emergency that you go to the nearest emergency room!    PLEASE CALL OUR OFFICE DURING NORMAL BUSINESS HOURS    Monday - Friday   8 am to 5 pm    [place]: [phone]    [place]: [phone]    [place]:  [phone]  We are committed to providing you the best care possible.    If you receive a survey after visiting one of our offices, please take time to share your experience concerning your physician office visit.  These surveys are confidential and no health information about you is shared.    We are eager to improve for you and we are counting on your feedback to help make that happen.
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
## 2025-01-10 ENCOUNTER — TELEPHONE (OUTPATIENT)
Dept: CARDIOLOGY CLINIC | Age: 75
End: 2025-01-10

## 2025-01-15 ENCOUNTER — TELEPHONE (OUTPATIENT)
Dept: CARDIOLOGY CLINIC | Age: 75
End: 2025-01-15

## 2025-01-15 NOTE — TELEPHONE ENCOUNTER
Notified and understanding         Nuclear exercise stress test with myocardial perfusion     Image quality is good.    Stress Test: A Migel protocol stress test was performed. The patient reached stage 2 of the protocol and was stressed for 5 min and 0 sec. Hemodynamics are adequate for diagnosis. Blood pressure demonstrated a normal response and heart rate demonstrated a normal response to stress. The patient's heart rate recovery was normal.    No ST-T wave changes are seen    Cardiolite study demonstrates an area of reduced tracer uptake of the apex of the left ventricle with slight improvement on the resting images rest of the myocardium shows normal tracer uptake ejection fraction by gated study 62% with normal global regional left ventricular systolic function    Cardiolite study demonstrates  mild apical ischemia the perfusion of the rest of the myocardium is normal    OV to discuss results and possible further workup including cardiac catheterization      Echo (TTE) complete     Left Ventricle: Hyperdynamic left ventricular systolic function with a visually estimated EF of 60 - 65%. Left ventricle size is normal. Moderately increased wall thickness. Normal wall motion. Grade I diastolic dysfunction with normal LAP.    Aortic Valve: Thickened cusps. Moderate regurgitation. AV PHT is 379.0 ms. Vena contracta measures 0.4 cm.    Mitral Valve: Mild regurgitation with multiple jets.    Tricuspid Valve: Mild regurgitation. The estimated RVSP is 30 mmHg.    Interatrial Septum: Hypermobile interatrial septum.    Image quality is good.    Reecho in 6 months to monitor the aortic insufficienc

## 2025-01-16 ENCOUNTER — TELEPHONE (OUTPATIENT)
Dept: CARDIOLOGY CLINIC | Age: 75
End: 2025-01-16
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
## 2025-01-17 ENCOUNTER — OFFICE VISIT (OUTPATIENT)
Dept: CARDIOLOGY CLINIC | Age: 75
End: 2025-01-17
Payer: MEDICARE

## 2025-01-17 ENCOUNTER — TRANSCRIBE ORDERS (OUTPATIENT)
Dept: CARDIOLOGY CLINIC | Age: 75
End: 2025-01-17

## 2025-01-17 VITALS
HEIGHT: 63 IN | WEIGHT: 141.4 LBS | SYSTOLIC BLOOD PRESSURE: 166 MMHG | BODY MASS INDEX: 25.05 KG/M2 | DIASTOLIC BLOOD PRESSURE: 88 MMHG | HEART RATE: 91 BPM

## 2025-01-17 DIAGNOSIS — R07.89 OTHER CHEST PAIN: Primary | ICD-10-CM

## 2025-01-17 DIAGNOSIS — R94.39 ABNORMAL NUCLEAR STRESS TEST: ICD-10-CM

## 2025-01-17 DIAGNOSIS — R93.1 ABNORMAL NUCLEAR CARDIAC IMAGING TEST: ICD-10-CM

## 2025-01-17 DIAGNOSIS — I10 PRIMARY HYPERTENSION: ICD-10-CM

## 2025-01-17 DIAGNOSIS — I38 VHD (VALVULAR HEART DISEASE): ICD-10-CM

## 2025-01-17 DIAGNOSIS — Z01.810 PRE-OPERATIVE CARDIOVASCULAR EXAMINATION: ICD-10-CM

## 2025-01-17 PROCEDURE — 3017F COLORECTAL CA SCREEN DOC REV: CPT | Performed by: NURSE PRACTITIONER

## 2025-01-17 PROCEDURE — 1159F MED LIST DOCD IN RCRD: CPT | Performed by: NURSE PRACTITIONER

## 2025-01-17 PROCEDURE — G8400 PT W/DXA NO RESULTS DOC: HCPCS | Performed by: NURSE PRACTITIONER

## 2025-01-17 PROCEDURE — G8419 CALC BMI OUT NRM PARAM NOF/U: HCPCS | Performed by: NURSE PRACTITIONER

## 2025-01-17 PROCEDURE — 1036F TOBACCO NON-USER: CPT | Performed by: NURSE PRACTITIONER

## 2025-01-17 PROCEDURE — 3079F DIAST BP 80-89 MM HG: CPT | Performed by: NURSE PRACTITIONER

## 2025-01-17 PROCEDURE — 1123F ACP DISCUSS/DSCN MKR DOCD: CPT | Performed by: NURSE PRACTITIONER

## 2025-01-17 PROCEDURE — G8427 DOCREV CUR MEDS BY ELIG CLIN: HCPCS | Performed by: NURSE PRACTITIONER

## 2025-01-17 PROCEDURE — 99214 OFFICE O/P EST MOD 30 MIN: CPT | Performed by: NURSE PRACTITIONER

## 2025-01-17 PROCEDURE — 1090F PRES/ABSN URINE INCON ASSESS: CPT | Performed by: NURSE PRACTITIONER

## 2025-01-17 PROCEDURE — 3077F SYST BP >= 140 MM HG: CPT | Performed by: NURSE PRACTITIONER

## 2025-01-17 RX ORDER — METOPROLOL TARTRATE 25 MG/1
25 TABLET, FILM COATED ORAL 2 TIMES DAILY
Qty: 60 TABLET | Refills: 2 | Status: SHIPPED | OUTPATIENT
Start: 2025-01-17

## 2025-01-17 RX ORDER — ASPIRIN 81 MG/1
81 TABLET, CHEWABLE ORAL DAILY
COMMUNITY

## 2025-01-17 ASSESSMENT — ENCOUNTER SYMPTOMS
ORTHOPNEA: 0
SHORTNESS OF BREATH: 0
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
## 2025-01-20 ENCOUNTER — TELEPHONE (OUTPATIENT)
Dept: CARDIOLOGY CLINIC | Age: 75
End: 2025-01-20

## 2025-01-20 NOTE — TELEPHONE ENCOUNTER
Patient was calling in to ask questions about her upcoming procedure.      Please call patient back.

## 2025-01-20 NOTE — TELEPHONE ENCOUNTER
Pt notified of procedure date and time. Procedure scheduled for 1/23/25 @ 2 PM, arrival @ 12 PM. Procedure instructions gone over with pt. Pt voiced understanding.

## 2025-01-20 NOTE — TELEPHONE ENCOUNTER
[facility]     Dr. Jeancarlos Solomon     LEFT HEART CATHETERIZATION WITH POSSIBLE PERCUTANEOUS CORONARY INTERVENTION      Patient Name: Rachel Mcdaniel   : 1950  MRN# [id number]    Date of Procedure: 25 Time: 2 Pm Arrival Time: 12 PM    The catheterization and angiogram are usually outpatient procedures, however if stenting is needed you may need to stay overnight. You will need to arrive at the hospital two hours before the procedure.  You will go to registration in the main lobby.  You will need to arrange for someone to drive you home.      HOSPITAL:  [facility] ([facility]      X   If you have received orders for blood work and or a chest x-ray, please have         them done on assigned date at [facility],           [facility], or [facility].     X Please do not have anything by mouth after midnight prior to or 8 hours before   the procedure.    X You may take your medications with a sip of water in the morning of your               procedure or take them with you to the hospital

## 2025-01-20 NOTE — TELEPHONE ENCOUNTER
Patient given instructions over telephone on 1/20/25.  Procedure is scheduled for 1/23/25 @ 2 PM, w/arrival @ 12 PM, @ [facility]. Medication/Education Letter gone over with patient. Procedure and risks were explained to patient. Questions answered, Patient voiced understanding.        Patient was notified that surgery date or time could be changed due to an emergency. Patient voiced understanding.

## 2025-01-21 ENCOUNTER — HOSPITAL ENCOUNTER (OUTPATIENT)
Dept: GENERAL RADIOLOGY | Age: 75
Discharge: HOME | End: 2025-01-21
Payer: MEDICARE

## 2025-01-21 ENCOUNTER — HOSPITAL ENCOUNTER (OUTPATIENT)
Age: 75
Discharge: HOME | End: 2025-01-21
Payer: MEDICARE

## 2025-01-21 DIAGNOSIS — Z01.810 PRE-OPERATIVE CARDIOVASCULAR EXAMINATION: ICD-10-CM

## 2025-01-21 LAB
ABO + RH BLD: NORMAL
ANION GAP SERPL CALCULATED.3IONS-SCNC: 10 MMOL/L (ref 9–17)
BLOOD BANK COMMENT: NORMAL
BLOOD BANK SAMPLE EXPIRATION: NORMAL
BLOOD GROUP ANTIBODIES SERPL: NEGATIVE
BUN SERPL-MCNC: 14 MG/DL (ref 7–20)
CALCIUM SERPL-MCNC: 9.7 MG/DL (ref 8.3–10.6)
CHLORIDE SERPL-SCNC: 105 MMOL/L (ref 99–110)
CO2 SERPL-SCNC: 26 MMOL/L (ref 21–32)
CREAT SERPL-MCNC: 0.6 MG/DL (ref 0.6–1.2)
ERYTHROCYTE [DISTWIDTH] IN BLOOD BY AUTOMATED COUNT: 13.2 % (ref 11.7–14.9)
GFR, ESTIMATED: >90 ML/MIN/1.73M2
GLUCOSE SERPL-MCNC: 94 MG/DL (ref 74–99)
HCT VFR BLD AUTO: 43.1 % (ref 37–47)
HGB BLD-MCNC: 13.7 G/DL (ref 12.5–16)
MCH RBC QN AUTO: 29.5 PG (ref 27–31)
MCHC RBC AUTO-ENTMCNC: 31.8 G/DL (ref 32–36)
MCV RBC AUTO: 92.9 FL (ref 78–100)
PLATELET # BLD AUTO: 204 K/UL (ref 140–440)
PMV BLD AUTO: 11 FL (ref 7.5–11.1)
POTASSIUM SERPL-SCNC: 4.3 MMOL/L (ref 3.5–5.1)
RBC # BLD AUTO: 4.64 M/UL (ref 4.2–5.4)
SODIUM SERPL-SCNC: 141 MMOL/L (ref 136–145)
WBC OTHER # BLD: 6 K/UL (ref 4–10.5)

## 2025-01-21 PROCEDURE — 86901 BLOOD TYPING SEROLOGIC RH(D): CPT

## 2025-01-21 PROCEDURE — 86900 BLOOD TYPING SEROLOGIC ABO: CPT

## 2025-01-21 PROCEDURE — 86850 RBC ANTIBODY SCREEN: CPT

## 2025-01-21 PROCEDURE — 71046 X-RAY EXAM CHEST 2 VIEWS: CPT

## 2025-01-21 PROCEDURE — 80048 BASIC METABOLIC PNL TOTAL CA: CPT

## 2025-01-21 PROCEDURE — 85027 COMPLETE CBC AUTOMATED: CPT

## 2025-01-22 NOTE — H&P
CC:   Rachel  is an established 74 y.o.  female here for left heart cath        SUBJECTIVE/OBJECTIVE:  Rachel is a 74 y.o. 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  She reports on Wednesday she had chest tightness for about 10 minutes while she was driving her car. No associated symptoms - non radiation. Relieved on own.         Review of Systems   Constitutional: Negative for diaphoresis and malaise/fatigue.   Cardiovascular:  Positive for chest pain. Negative for claudication, dyspnea on exertion, irregular heartbeat, leg swelling, near-syncope, orthopnea, palpitations and paroxysmal nocturnal dyspnea.   Respiratory:  Negative for shortness of breath.    Neurological:  Negative for dizziness and light-headedness.         Vitals        Vitals:     01/17/25 1620 01/17/25 1624   BP: (!) 164/86 (!) 166/88   Site: Left Upper Arm Left Upper Arm   Position: Sitting Sitting   Cuff Size: Medium Adult Medium Adult   Pulse: 91     Weight: 64.1 kg (141 lb 6.4 oz)     Height: 1.6 m (5' 3\")               Wt Readings from Last 3 Encounters:   01/17/25 64.1 kg (141 lb 6.4 oz)   01/15/25 64.4 kg (142 lb)   01/09/25 64.6 kg (142 lb 6.4 oz)      Body mass index is 25.05 kg/m².      Physical Exam  Vitals reviewed.   Eyes:      Pupils: Pupils are equal, round, and reactive to light.   Cardiovascular:      Rate and Rhythm: Normal rate and regular rhythm.      Pulses: Normal pulses.      Heart sounds: Murmur heard.      Diastolic murmur is present with a grade of 2/4.   Pulmonary:      Effort: Pulmonary effort is normal.      Breath sounds: No rales.   Musculoskeletal:      Right lower leg: No edema.      Left lower leg: No edema.   Skin:     General: Skin is warm and dry.      Capillary Refill: Capillary refill takes less than 2 seconds.   Neurological:      Mental Status: She is alert and oriented to person, place, and time.

## 2025-01-23 ENCOUNTER — HOSPITAL ENCOUNTER (OUTPATIENT)
Age: 75
Setting detail: OUTPATIENT SURGERY
Discharge: HOME | End: 2025-01-23
Attending: INTERNAL MEDICINE | Admitting: INTERNAL MEDICINE
Payer: MEDICARE

## 2025-01-23 VITALS
DIASTOLIC BLOOD PRESSURE: 82 MMHG | OXYGEN SATURATION: 100 % | WEIGHT: 141 LBS | SYSTOLIC BLOOD PRESSURE: 158 MMHG | HEART RATE: 84 BPM | BODY MASS INDEX: 24.98 KG/M2 | HEIGHT: 63 IN | RESPIRATION RATE: 21 BRPM | TEMPERATURE: 97 F

## 2025-01-23 DIAGNOSIS — R94.39 ABNORMAL NUCLEAR STRESS TEST: ICD-10-CM

## 2025-01-23 DIAGNOSIS — I38 VHD (VALVULAR HEART DISEASE): ICD-10-CM

## 2025-01-23 DIAGNOSIS — I38 VHD (VALVULAR HEART DISEASE): Primary | ICD-10-CM

## 2025-01-23 DIAGNOSIS — R07.9 CHEST PAIN, UNSPECIFIED TYPE: ICD-10-CM

## 2025-01-23 DIAGNOSIS — I35.1 NONRHEUMATIC AORTIC VALVE INSUFFICIENCY: Primary | ICD-10-CM

## 2025-01-23 DIAGNOSIS — R07.89 OTHER CHEST PAIN: ICD-10-CM

## 2025-01-23 LAB — ECHO BSA: 1.69 M2

## 2025-01-23 PROCEDURE — 7100000010 HC PHASE II RECOVERY - FIRST 15 MIN: Performed by: INTERNAL MEDICINE

## 2025-01-23 PROCEDURE — 6370000000 HC RX 637 (ALT 250 FOR IP): Performed by: INTERNAL MEDICINE

## 2025-01-23 PROCEDURE — C1769 GUIDE WIRE: HCPCS | Performed by: INTERNAL MEDICINE

## 2025-01-23 PROCEDURE — 2709999900 HC NON-CHARGEABLE SUPPLY: Performed by: INTERNAL MEDICINE

## 2025-01-23 PROCEDURE — 99152 MOD SED SAME PHYS/QHP 5/>YRS: CPT | Performed by: INTERNAL MEDICINE

## 2025-01-23 PROCEDURE — 93458 L HRT ARTERY/VENTRICLE ANGIO: CPT | Performed by: INTERNAL MEDICINE

## 2025-01-23 PROCEDURE — 6360000002 HC RX W HCPCS: Performed by: INTERNAL MEDICINE

## 2025-01-23 PROCEDURE — 2500000003 HC RX 250 WO HCPCS: Performed by: INTERNAL MEDICINE

## 2025-01-23 PROCEDURE — 7100000011 HC PHASE II RECOVERY - ADDTL 15 MIN: Performed by: INTERNAL MEDICINE

## 2025-01-23 PROCEDURE — 6360000004 HC RX CONTRAST MEDICATION: Performed by: INTERNAL MEDICINE

## 2025-01-23 PROCEDURE — C1894 INTRO/SHEATH, NON-LASER: HCPCS | Performed by: INTERNAL MEDICINE

## 2025-01-23 PROCEDURE — 6360000002 HC RX W HCPCS

## 2025-01-23 RX ORDER — DIAZEPAM 5 MG/1
5 TABLET ORAL ONCE
Status: COMPLETED | OUTPATIENT
Start: 2025-01-23 | End: 2025-01-23

## 2025-01-23 RX ORDER — MIDAZOLAM HYDROCHLORIDE 1 MG/ML
INJECTION, SOLUTION INTRAMUSCULAR; INTRAVENOUS PRN
Status: DISCONTINUED | OUTPATIENT
Start: 2025-01-23 | End: 2025-01-23 | Stop reason: HOSPADM

## 2025-01-23 RX ORDER — IOPAMIDOL 755 MG/ML
INJECTION, SOLUTION INTRAVASCULAR PRN
Status: DISCONTINUED | OUTPATIENT
Start: 2025-01-23 | End: 2025-01-23 | Stop reason: HOSPADM

## 2025-01-23 RX ORDER — HYDRALAZINE HYDROCHLORIDE 20 MG/ML
10 INJECTION INTRAMUSCULAR; INTRAVENOUS ONCE
Status: COMPLETED | OUTPATIENT
Start: 2025-01-23 | End: 2025-01-23

## 2025-01-23 RX ORDER — DIPHENHYDRAMINE HCL 25 MG
25 TABLET ORAL ONCE
Status: COMPLETED | OUTPATIENT
Start: 2025-01-23 | End: 2025-01-23

## 2025-01-23 RX ORDER — AMLODIPINE BESYLATE 5 MG/1
5 TABLET ORAL DAILY
Qty: 90 TABLET | Refills: 3 | Status: SHIPPED | OUTPATIENT
Start: 2025-01-23

## 2025-01-23 RX ORDER — AMLODIPINE BESYLATE 5 MG/1
5 TABLET ORAL DAILY
Status: DISCONTINUED | OUTPATIENT
Start: 2025-01-23 | End: 2025-01-23 | Stop reason: HOSPADM

## 2025-01-23 RX ORDER — HYDRALAZINE HYDROCHLORIDE 20 MG/ML
INJECTION INTRAMUSCULAR; INTRAVENOUS
Status: COMPLETED
Start: 2025-01-23 | End: 2025-01-23

## 2025-01-23 RX ADMIN — HYDRALAZINE HYDROCHLORIDE 10 MG: 20 INJECTION INTRAMUSCULAR; INTRAVENOUS at 14:35

## 2025-01-23 RX ADMIN — DIAZEPAM 5 MG: 5 TABLET ORAL at 11:35

## 2025-01-23 RX ADMIN — DIPHENHYDRAMINE HYDROCHLORIDE 25 MG: 25 TABLET ORAL at 11:35

## 2025-01-23 NOTE — PROGRESS NOTES
Outpatient Pharmacy Progress Note for Meds-to-Beds    Total number of Prescriptions Filled: 1    Additional Documentation:  Patient's family member picked-up the medication(s) in the OP Pharmacy      Thank you for letting us serve your patients.  [address] [address]    Phone: [phone]    Fax: [phone]

## 2025-01-23 NOTE — PLAN OF CARE
Received from cath lab.  Monitor and alarms on. Call Light in reach. Spoke with Dr. Solomon in regards to patients blood pressure being elevated with systolic in the low 200's. New orders received for hydralazine IV to be given. Deepthi Hillman RN 1/23/2025

## 2025-01-23 NOTE — FLOWSHEET NOTE
Discharge instructions reviewed with patient per YOBANI Boyd Voices understanding.  Ambulated without difficulty.  Informed pt that Alie will be phoning her with dates/times of CTA/BELLE

## 2025-01-23 NOTE — CONSULTS
Cardiothoracic Surgery  IN-PATIENT SERVICE   [facility]    CONSULTATION / HISTORY AND PHYSICAL EXAMINATION            Date:   1/23/2025  Patient name:  Rachel Mcdaniel  Date of admission:  1/23/2025 11:16 AM  MRN:   [id number]  Account:  [id number]  YOB: 1950  PCP:    Arnoldo Wheeler MD  Room:   Cath Pool Room/  Code Status:    No Order    Physician Requesting Consult: No att. providers found    Reason for Consult:  triple vessel cad    Chief Complaint:     No cardiac symptoms    History of Present Illness:     Rachel is a 74 y.o. 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    Her TTE showed moderate AI and mild MR.  Pertinent PMH of radiation to Left chest and bilateral mastectomy in 2006  We were consulted for surgical evaluation    Past Medical History:     Past Medical History:   Diagnosis Date    Allergic rhinitis     Breast cancer (HCC) 11/2005, 10/2003, 1/18/2002    Depression     Family history of coronary artery disease     Fibroids 1984    heavy periods with fibroids    Frozen shoulder 12/12/2008    left    GERD (gastroesophageal reflux disease)     H/O cardiovascular stress test 12/03/2015    treadmill    H/O chest x-ray 05/17/2007    H/O echocardiogram 06/02/2011    mild diastolic dysfunction    History of exercise stress test 12/26/2019    treadmill  Negative for ischemia by diagnostic criteria, Frequent PAC during stress at peak exercise, no chest pain, heavy PAC burden seen in stress    History of left heart catheterization (LHC) 01/23/2025    Hx of  Carotid Doppler ultrasound 12/20/2016    Normal echo    Hyperlipidemia     Hypertension     Hypothyroidism     VHD (valvular heart disease)     Mitral valve disease        Past Surgical History:     Past Surgical History:   Procedure Laterality Date

## 2025-01-23 NOTE — PROGRESS NOTES
Gene 2   ASA 2    Procedure note  Left cardiac catheterization selective coronary angiography and ventriculography    Indication  Abnormal Cardiolite stress test    Findings  Left main has a 50% ostial lesion  The left descending artery is a large-caliber vessel that 70% bifurcation lesion  The diagonal has 80%  stenosis segment   Circumflex vessel vessel is a nondominant vessel and the PL branch, has a 99% stenosis also has ostial 50% stenosis as well    right coronary artery is a dominant vessel in its PL is about 50 stenosis  LV gram shows normal EF.  The patient was 60%    Assessment and plan.  Patient is triple-vessel disease  Discussed with cardiothoracic surgery for further management  In the meantime medical management maximized  Risk factor modification

## 2025-01-23 NOTE — PLAN OF CARE
Nato MORENO still in speaking with patient and family at bedside at length. Hydralazine IV given at this time for patients high BP. Patient denies additional needs at this time. Deepthi Hillman RN 1/23/2025

## 2025-01-23 NOTE — FLOWSHEET NOTE
Pt to pt  in wheelchair per RN for d/c home in private vehicle with family. Right radial site free of bleeding/hematoma at time of d/c

## 2025-01-24 ENCOUNTER — TELEPHONE (OUTPATIENT)
Dept: CARDIOLOGY CLINIC | Age: 75
End: 2025-01-24

## 2025-01-24 ENCOUNTER — TELEPHONE (OUTPATIENT)
Dept: CARDIOTHORACIC SURGERY | Age: 75
End: 2025-01-24

## 2025-01-24 DIAGNOSIS — I38 VHD (VALVULAR HEART DISEASE): Primary | ICD-10-CM

## 2025-01-24 NOTE — TELEPHONE ENCOUNTER
I spoke w/ pt and explained we do not have a definitive time for BELLE on Monday, but that emails have been sent and I will go the lab at 8 am Monday and get a solid time, after that I will call pt son and let them know for sure what time.  Pt voiced understanding

## 2025-01-24 NOTE — TELEPHONE ENCOUNTER
Called pt back and let her know they would do the tom on tues prior to surgery.  Also emailed Deepthi and asked her to call pt to confirm surg info

## 2025-01-24 NOTE — TELEPHONE ENCOUNTER
Cardiac CTA Protocol    ?   Instructions for Cardiac CTA    Patient needs to NPO for 8 hours prior to the testing   If Patient is on Metformin: Hold 48 Hours prior to the Cardiac CTA.   Patient needs to have a recent BUN & CREAT (within 30 days) with no history of Renal concerns. If they have renal problems will need a BUN & CREAT within 2 weeks of the Cardiac CTA.    If the patient has an allergy to DYE, IODINE, and/or SHELLFISH order:    Prednisone (Medrol) 32mg- Take 1 tablet 12 hours prior and 1 tablet 2 hours prior to CTA    Benadryl 50mg- Take 50mg 2 hours prior to the procedure.    Encourage the patient to drink 1 Liter of water after the CTA to help flush the kidneys.    Continue all home medications including Beta blockers    Metoprolol Tartrate- Check the last BP and HR done at the last visit to follow instructions below.    ?   Heart rate more than 65 and systolic BP greater than 100   Give Metoprolol 100 mg 12 hours and 2 hours prior to the scan.       Heart Rate between 60-65 and systolic BP Greater than 100   Give Metoprolol 50 mg 12 hours and 2 hours prior to the scan.          Heart rate less than 60 there is NO additional Beta Blockers needed.    ?   ?   Contact Dr. Gonzalez for additional instructions with the following:    If the Systolic BP is NOT greater than 100   If the patient is allergic to Metoprolol or to beta blockers.        ?**This does not apply to Dr. Paez's orders or CTA Calcium Scoring**

## 2025-01-24 NOTE — TELEPHONE ENCOUNTER
Elisha called back and said the BELLE was scheduled and Miky at the hospital c/a appt.     So Elisha is going to call and get patient set up again and call the patient.

## 2025-01-24 NOTE — TELEPHONE ENCOUNTER
Patient has been calling the office regarding surgery for next week. I spoke to Clari she is waiting on [facility] to Schedule BELLE.     I called and spoke to Elisha with the [facility].     She said when she gets a date and time form her schedulers then they will call the patient.     I stated the BELLE is supposed to be done on Monday.     Elisha said when they have a date and time they will call the patient.

## 2025-01-24 NOTE — TELEPHONE ENCOUNTER
Per Dr. Solomon pt to have BELLE Monday 1/27/25. Order was placed on 1/23/25 for pt to have test on Monday, order was canceled reason \"to be done inpatient on 1/23/25\". Procedure was not completed, new order placed for BELLE for Monday 1/27/25, per office policy, email sent to hospital staff and phone call placed to [facility] at [phone]. Sunshine at the [facility] [place] notified myself that \"schedule for Monday is full and I am not allowed to add this without permission from Sandrine and she is gone\". Irma Watts RPA and Dr. Solomon both notified and aware.

## 2025-01-24 NOTE — TELEPHONE ENCOUNTER
Patient given instructions over telephone on 1/24/25.  Procedure is scheduled for 1/27/25 @ 730am, w/arrival @ 8am, @ [facility]. Medication/Education Letter gone over with patient. Procedure and risks were explained to patient. Questions answered, Patient voiced understanding.        Patient was notified that surgery date or time could be changed due to an emergency. Patient voiced understanding.

## 2025-01-25 PROBLEM — I35.1 NONRHEUMATIC AORTIC VALVE INSUFFICIENCY: Status: ACTIVE | Noted: 2025-01-25

## 2025-01-27 ENCOUNTER — ANESTHESIA EVENT (OUTPATIENT)
Dept: OPERATING ROOM | Age: 75
End: 2025-01-27
Payer: MEDICARE

## 2025-01-27 ENCOUNTER — HOSPITAL ENCOUNTER (INPATIENT)
Age: 75
LOS: 9 days | Discharge: NURSING, REHAB OR LONG-TERM CARE | DRG: 236 | End: 2025-02-05
Attending: THORACIC SURGERY (CARDIOTHORACIC VASCULAR SURGERY) | Admitting: THORACIC SURGERY (CARDIOTHORACIC VASCULAR SURGERY)
Payer: MEDICARE

## 2025-01-27 ENCOUNTER — HOSPITAL ENCOUNTER (OUTPATIENT)
Dept: CT IMAGING | Age: 75
Discharge: HOME | End: 2025-01-27
Attending: INTERNAL MEDICINE
Payer: MEDICARE

## 2025-01-27 ENCOUNTER — HOSPITAL ENCOUNTER (OUTPATIENT)
Dept: NON INVASIVE DIAGNOSTICS | Age: 75
Discharge: HOME | End: 2025-01-29
Attending: INTERNAL MEDICINE
Payer: MEDICARE

## 2025-01-27 ENCOUNTER — TELEPHONE (OUTPATIENT)
Dept: CARDIOLOGY CLINIC | Age: 75
End: 2025-01-27

## 2025-01-27 ENCOUNTER — HOSPITAL ENCOUNTER (OUTPATIENT)
Dept: ULTRASOUND IMAGING | Age: 75
Discharge: HOME | End: 2025-01-27
Attending: INTERNAL MEDICINE
Payer: MEDICARE

## 2025-01-27 VITALS
TEMPERATURE: 98.5 F | RESPIRATION RATE: 22 BRPM | BODY MASS INDEX: 24.98 KG/M2 | OXYGEN SATURATION: 94 % | HEIGHT: 63 IN | DIASTOLIC BLOOD PRESSURE: 74 MMHG | WEIGHT: 141 LBS | SYSTOLIC BLOOD PRESSURE: 175 MMHG | HEART RATE: 73 BPM

## 2025-01-27 DIAGNOSIS — I38 VHD (VALVULAR HEART DISEASE): ICD-10-CM

## 2025-01-27 DIAGNOSIS — R07.9 CHEST PAIN, UNSPECIFIED TYPE: ICD-10-CM

## 2025-01-27 DIAGNOSIS — Z95.1 S/P CABG (CORONARY ARTERY BYPASS GRAFT): ICD-10-CM

## 2025-01-27 DIAGNOSIS — I48.0 PAROXYSMAL ATRIAL FIBRILLATION (HCC): Primary | ICD-10-CM

## 2025-01-27 DIAGNOSIS — R07.89 OTHER CHEST PAIN: ICD-10-CM

## 2025-01-27 PROBLEM — I25.10 CORONARY ATHEROSCLEROSIS OF NATIVE CORONARY ARTERY: Status: ACTIVE | Noted: 2025-01-27

## 2025-01-27 PROBLEM — I35.1 AORTIC REGURGITATION: Status: ACTIVE | Noted: 2025-01-27

## 2025-01-27 PROBLEM — Z03.89 CORONARY ARTERY DISEASE (CAD) EXCLUDED: Status: ACTIVE | Noted: 2025-01-27

## 2025-01-27 PROBLEM — I25.110 CORONARY ARTERY DISEASE INVOLVING NATIVE HEART WITH UNSTABLE ANGINA PECTORIS (HCC): Status: ACTIVE | Noted: 2025-01-27

## 2025-01-27 LAB
ALBUMIN SERPL-MCNC: 4.3 G/DL (ref 3.4–5)
ALBUMIN/GLOB SERPL: 1.3 {RATIO} (ref 1.1–2.2)
ALP SERPL-CCNC: 76 U/L (ref 40–129)
ALT SERPL-CCNC: 29 U/L (ref 10–40)
ANION GAP SERPL CALCULATED.3IONS-SCNC: 13 MMOL/L (ref 9–17)
ARTERIAL PATENCY WRIST A: ABNORMAL
AST SERPL-CCNC: 34 U/L (ref 15–37)
BDY SITE: ABNORMAL
BILIRUB DIRECT SERPL-MCNC: 0.2 MG/DL (ref 0–0.3)
BILIRUB INDIRECT SERPL-MCNC: 0.1 MG/DL (ref 0–0.7)
BILIRUB SERPL-MCNC: 0.4 MG/DL (ref 0–1)
BILIRUB UR QL STRIP: NEGATIVE
BODY TEMPERATURE: 37
BUN SERPL-MCNC: 12 MG/DL (ref 7–20)
CALCIUM SERPL-MCNC: 9.3 MG/DL (ref 8.3–10.6)
CHLORIDE SERPL-SCNC: 107 MMOL/L (ref 99–110)
CHOLEST SERPL-MCNC: 199 MG/DL (ref 125–199)
CLARITY UR: CLEAR
CO2 SERPL-SCNC: 23 MMOL/L (ref 21–32)
COHGB MFR BLD: 0.1 % (ref 0.5–1.5)
COLOR UR: YELLOW
COMMENT: NORMAL
CREAT SERPL-MCNC: 0.8 MG/DL (ref 0.6–1.2)
ECHO BSA: 1.69 M2
ERYTHROCYTE [DISTWIDTH] IN BLOOD BY AUTOMATED COUNT: 13.1 % (ref 11.7–14.9)
EST. AVERAGE GLUCOSE BLD GHB EST-MCNC: 124 MG/DL
FIBRINOGEN PPP-MCNC: 366 MG/DL (ref 170–540)
FIO2 ON VENT: 21 %
GAS FLOW.O2 O2 DELIVERY SYS: ABNORMAL L/MIN
GFR, ESTIMATED: 73 ML/MIN/1.73M2
GLUCOSE SERPL-MCNC: 91 MG/DL (ref 74–99)
GLUCOSE UR STRIP-MCNC: NEGATIVE MG/DL
HBA1C MFR BLD: 6 % (ref 4.2–6.3)
HCO3 ARTERIAL: 21.1 MMOL/L (ref 21–28)
HCT VFR BLD AUTO: 43.9 % (ref 37–47)
HDLC SERPL-MCNC: 44 MG/DL
HGB BLD-MCNC: 14.1 G/DL (ref 12.5–16)
HGB UR QL STRIP.AUTO: NEGATIVE
INR PPP: 1
KETONES UR STRIP-MCNC: NEGATIVE MG/DL
LDLC SERPL CALC-MCNC: 103 MG/DL
LEUKOCYTE ESTERASE UR QL STRIP: NEGATIVE
MAGNESIUM SERPL-MCNC: 2.3 MG/DL (ref 1.8–2.4)
MCH RBC QN AUTO: 29.3 PG (ref 27–31)
MCHC RBC AUTO-ENTMCNC: 32.1 G/DL (ref 32–36)
MCV RBC AUTO: 91.3 FL (ref 78–100)
METHEMOGLOBIN: 0.7 % (ref 0.5–1.5)
MRSA, DNA, NASAL: NOT DETECTED
NEGATIVE BASE EXCESS, ART: 2.4 MMOL/L (ref 0–3)
NITRITE UR QL STRIP: NEGATIVE
OXYHGB MFR BLD: 96.8 %
PARTIAL THROMBOPLASTIN TIME: 28.9 SEC (ref 25.1–37.1)
PCO2 ARTERIAL: 33.1 MMHG (ref 32–45)
PH ARTERIAL: 7.42 (ref 7.35–7.45)
PH UR STRIP: 6.5 [PH] (ref 5–8)
PLATELET # BLD AUTO: 251 K/UL (ref 140–440)
PMV BLD AUTO: 10.4 FL (ref 7.5–11.1)
PO2 ARTERIAL: 102.2 MMHG (ref 83–108)
POTASSIUM SERPL-SCNC: 3.8 MMOL/L (ref 3.5–5.1)
PROT SERPL-MCNC: 7.5 G/DL (ref 6.4–8.2)
PROT UR STRIP-MCNC: NEGATIVE MG/DL
PROTHROMBIN TIME: 13.1 SEC (ref 11.7–14.5)
RBC # BLD AUTO: 4.81 M/UL (ref 4.2–5.4)
SODIUM SERPL-SCNC: 142 MMOL/L (ref 136–145)
SP GR UR STRIP: 1.01 (ref 1–1.03)
SPECIMEN DESCRIPTION: NORMAL
TRIGL SERPL-MCNC: 260 MG/DL
UROBILINOGEN UR STRIP-ACNC: 0.2 EU/DL (ref 0–1)
WBC OTHER # BLD: 8.6 K/UL (ref 4–10.5)

## 2025-01-27 PROCEDURE — 36600 WITHDRAWAL OF ARTERIAL BLOOD: CPT

## 2025-01-27 PROCEDURE — 82248 BILIRUBIN DIRECT: CPT

## 2025-01-27 PROCEDURE — 80053 COMPREHEN METABOLIC PANEL: CPT

## 2025-01-27 PROCEDURE — 94010 BREATHING CAPACITY TEST: CPT

## 2025-01-27 PROCEDURE — 86900 BLOOD TYPING SEROLOGIC ABO: CPT

## 2025-01-27 PROCEDURE — 36415 COLL VENOUS BLD VENIPUNCTURE: CPT

## 2025-01-27 PROCEDURE — 83036 HEMOGLOBIN GLYCOSYLATED A1C: CPT

## 2025-01-27 PROCEDURE — 7100000010 HC PHASE II RECOVERY - FIRST 15 MIN: Performed by: INTERNAL MEDICINE

## 2025-01-27 PROCEDURE — 86901 BLOOD TYPING SEROLOGIC RH(D): CPT

## 2025-01-27 PROCEDURE — 6360000002 HC RX W HCPCS: Performed by: PHYSICIAN ASSISTANT

## 2025-01-27 PROCEDURE — 86850 RBC ANTIBODY SCREEN: CPT

## 2025-01-27 PROCEDURE — 82805 BLOOD GASES W/O2 SATURATION: CPT

## 2025-01-27 PROCEDURE — 93312 ECHO TRANSESOPHAGEAL: CPT | Performed by: INTERNAL MEDICINE

## 2025-01-27 PROCEDURE — 85027 COMPLETE CBC AUTOMATED: CPT

## 2025-01-27 PROCEDURE — 80061 LIPID PANEL: CPT

## 2025-01-27 PROCEDURE — 6370000000 HC RX 637 (ALT 250 FOR IP): Performed by: INTERNAL MEDICINE

## 2025-01-27 PROCEDURE — 93325 DOPPLER ECHO COLOR FLOW MAPG: CPT | Performed by: INTERNAL MEDICINE

## 2025-01-27 PROCEDURE — 2060000000 HC ICU INTERMEDIATE R&B

## 2025-01-27 PROCEDURE — 6370000000 HC RX 637 (ALT 250 FOR IP): Performed by: PHYSICIAN ASSISTANT

## 2025-01-27 PROCEDURE — 93970 EXTREMITY STUDY: CPT

## 2025-01-27 PROCEDURE — 7100000011 HC PHASE II RECOVERY - ADDTL 15 MIN: Performed by: INTERNAL MEDICINE

## 2025-01-27 PROCEDURE — 6360000002 HC RX W HCPCS: Performed by: INTERNAL MEDICINE

## 2025-01-27 PROCEDURE — 85384 FIBRINOGEN ACTIVITY: CPT

## 2025-01-27 PROCEDURE — 93880 EXTRACRANIAL BILAT STUDY: CPT

## 2025-01-27 PROCEDURE — 85730 THROMBOPLASTIN TIME PARTIAL: CPT

## 2025-01-27 PROCEDURE — 93320 DOPPLER ECHO COMPLETE: CPT | Performed by: INTERNAL MEDICINE

## 2025-01-27 PROCEDURE — 99152 MOD SED SAME PHYS/QHP 5/>YRS: CPT | Performed by: INTERNAL MEDICINE

## 2025-01-27 PROCEDURE — 6360000004 HC RX CONTRAST MEDICATION: Performed by: INTERNAL MEDICINE

## 2025-01-27 PROCEDURE — 85610 PROTHROMBIN TIME: CPT

## 2025-01-27 PROCEDURE — 81003 URINALYSIS AUTO W/O SCOPE: CPT

## 2025-01-27 PROCEDURE — 2500000003 HC RX 250 WO HCPCS: Performed by: ANESTHESIOLOGY

## 2025-01-27 PROCEDURE — 86923 COMPATIBILITY TEST ELECTRIC: CPT

## 2025-01-27 PROCEDURE — 94761 N-INVAS EAR/PLS OXIMETRY MLT: CPT

## 2025-01-27 PROCEDURE — 83735 ASSAY OF MAGNESIUM: CPT

## 2025-01-27 PROCEDURE — 93312 ECHO TRANSESOPHAGEAL: CPT

## 2025-01-27 PROCEDURE — 99222 1ST HOSP IP/OBS MODERATE 55: CPT | Performed by: THORACIC SURGERY (CARDIOTHORACIC VASCULAR SURGERY)

## 2025-01-27 PROCEDURE — 76937 US GUIDE VASCULAR ACCESS: CPT

## 2025-01-27 PROCEDURE — 71275 CT ANGIOGRAPHY CHEST: CPT

## 2025-01-27 PROCEDURE — 87641 MR-STAPH DNA AMP PROBE: CPT

## 2025-01-27 RX ORDER — ONDANSETRON 4 MG/1
4 TABLET, ORALLY DISINTEGRATING ORAL EVERY 8 HOURS PRN
Status: DISCONTINUED | OUTPATIENT
Start: 2025-01-27 | End: 2025-01-28

## 2025-01-27 RX ORDER — ATORVASTATIN CALCIUM 40 MG/1
80 TABLET, FILM COATED ORAL NIGHTLY
Status: DISCONTINUED | OUTPATIENT
Start: 2025-01-27 | End: 2025-01-28

## 2025-01-27 RX ORDER — SODIUM CHLORIDE 9 MG/ML
INJECTION, SOLUTION INTRAVENOUS PRN
Status: DISCONTINUED | OUTPATIENT
Start: 2025-01-27 | End: 2025-01-28 | Stop reason: HOSPADM

## 2025-01-27 RX ORDER — MAGNESIUM SULFATE IN WATER 40 MG/ML
2000 INJECTION, SOLUTION INTRAVENOUS PRN
Status: DISCONTINUED | OUTPATIENT
Start: 2025-01-27 | End: 2025-01-28

## 2025-01-27 RX ORDER — CHLORHEXIDINE GLUCONATE 40 MG/ML
SOLUTION TOPICAL SEE ADMIN INSTRUCTIONS
Status: DISCONTINUED | OUTPATIENT
Start: 2025-01-27 | End: 2025-01-28 | Stop reason: HOSPADM

## 2025-01-27 RX ORDER — SODIUM CHLORIDE 0.9 % (FLUSH) 0.9 %
5-40 SYRINGE (ML) INJECTION EVERY 12 HOURS SCHEDULED
Status: DISCONTINUED | OUTPATIENT
Start: 2025-01-27 | End: 2025-01-28 | Stop reason: HOSPADM

## 2025-01-27 RX ORDER — METOPROLOL TARTRATE 25 MG/1
25 TABLET, FILM COATED ORAL 2 TIMES DAILY
Status: DISCONTINUED | OUTPATIENT
Start: 2025-01-27 | End: 2025-01-28

## 2025-01-27 RX ORDER — MUPIROCIN 20 MG/G
OINTMENT TOPICAL 2 TIMES DAILY
Status: DISCONTINUED | OUTPATIENT
Start: 2025-01-27 | End: 2025-01-28 | Stop reason: HOSPADM

## 2025-01-27 RX ORDER — NOREPINEPHRINE BITARTRATE 0.06 MG/ML
1-100 INJECTION, SOLUTION INTRAVENOUS CONTINUOUS
Status: DISCONTINUED | OUTPATIENT
Start: 2025-01-28 | End: 2025-01-28

## 2025-01-27 RX ORDER — POTASSIUM CHLORIDE 1500 MG/1
40 TABLET, EXTENDED RELEASE ORAL PRN
Status: DISCONTINUED | OUTPATIENT
Start: 2025-01-27 | End: 2025-01-28

## 2025-01-27 RX ORDER — ENOXAPARIN SODIUM 100 MG/ML
40 INJECTION SUBCUTANEOUS DAILY
Status: DISCONTINUED | OUTPATIENT
Start: 2025-01-27 | End: 2025-01-27

## 2025-01-27 RX ORDER — ASPIRIN 81 MG/1
81 TABLET, CHEWABLE ORAL DAILY
Status: DISCONTINUED | OUTPATIENT
Start: 2025-01-27 | End: 2025-01-28

## 2025-01-27 RX ORDER — LEVOTHYROXINE SODIUM 75 UG/1
150 TABLET ORAL EVERY MORNING
Status: DISCONTINUED | OUTPATIENT
Start: 2025-01-28 | End: 2025-01-28

## 2025-01-27 RX ORDER — POTASSIUM CHLORIDE 7.45 MG/ML
10 INJECTION INTRAVENOUS PRN
Status: DISCONTINUED | OUTPATIENT
Start: 2025-01-27 | End: 2025-01-28

## 2025-01-27 RX ORDER — SODIUM CHLORIDE 0.9 % (FLUSH) 0.9 %
5-40 SYRINGE (ML) INJECTION PRN
Status: DISCONTINUED | OUTPATIENT
Start: 2025-01-27 | End: 2025-01-28

## 2025-01-27 RX ORDER — CHLORHEXIDINE GLUCONATE ORAL RINSE 1.2 MG/ML
15 SOLUTION DENTAL ONCE
Status: DISCONTINUED | OUTPATIENT
Start: 2025-01-27 | End: 2025-01-28 | Stop reason: HOSPADM

## 2025-01-27 RX ORDER — LIDOCAINE HYDROCHLORIDE 20 MG/ML
SOLUTION OROPHARYNGEAL PRN
Status: COMPLETED | OUTPATIENT
Start: 2025-01-27 | End: 2025-01-27

## 2025-01-27 RX ORDER — SODIUM CHLORIDE 0.9 % (FLUSH) 0.9 %
5-40 SYRINGE (ML) INJECTION PRN
Status: DISCONTINUED | OUTPATIENT
Start: 2025-01-27 | End: 2025-01-28 | Stop reason: HOSPADM

## 2025-01-27 RX ORDER — SODIUM CHLORIDE 9 MG/ML
INJECTION, SOLUTION INTRAVENOUS PRN
Status: DISCONTINUED | OUTPATIENT
Start: 2025-01-27 | End: 2025-01-28

## 2025-01-27 RX ORDER — ACETAMINOPHEN 650 MG/1
650 SUPPOSITORY RECTAL EVERY 6 HOURS PRN
Status: DISCONTINUED | OUTPATIENT
Start: 2025-01-27 | End: 2025-01-28

## 2025-01-27 RX ORDER — METOPROLOL TARTRATE 25 MG/1
25 TABLET, FILM COATED ORAL ONCE
Status: COMPLETED | OUTPATIENT
Start: 2025-01-27 | End: 2025-01-27

## 2025-01-27 RX ORDER — IOPAMIDOL 755 MG/ML
75 INJECTION, SOLUTION INTRAVASCULAR
Status: COMPLETED | OUTPATIENT
Start: 2025-01-27 | End: 2025-01-27

## 2025-01-27 RX ORDER — HYDRALAZINE HYDROCHLORIDE 20 MG/ML
10 INJECTION INTRAMUSCULAR; INTRAVENOUS EVERY 4 HOURS PRN
Status: DISCONTINUED | OUTPATIENT
Start: 2025-01-27 | End: 2025-01-28

## 2025-01-27 RX ORDER — ONDANSETRON 2 MG/ML
4 INJECTION INTRAMUSCULAR; INTRAVENOUS EVERY 6 HOURS PRN
Status: DISCONTINUED | OUTPATIENT
Start: 2025-01-27 | End: 2025-01-28

## 2025-01-27 RX ORDER — ACETAMINOPHEN 325 MG/1
650 TABLET ORAL EVERY 6 HOURS PRN
Status: DISCONTINUED | OUTPATIENT
Start: 2025-01-27 | End: 2025-01-28

## 2025-01-27 RX ORDER — FENTANYL CITRATE 50 UG/ML
INJECTION, SOLUTION INTRAMUSCULAR; INTRAVENOUS PRN
Status: COMPLETED | OUTPATIENT
Start: 2025-01-27 | End: 2025-01-27

## 2025-01-27 RX ORDER — ACETAMINOPHEN 500 MG
1000 TABLET ORAL ONCE
Status: DISCONTINUED | OUTPATIENT
Start: 2025-01-27 | End: 2025-01-28 | Stop reason: HOSPADM

## 2025-01-27 RX ORDER — SODIUM CHLORIDE 0.9 % (FLUSH) 0.9 %
5-40 SYRINGE (ML) INJECTION EVERY 12 HOURS SCHEDULED
Status: DISCONTINUED | OUTPATIENT
Start: 2025-01-27 | End: 2025-01-28

## 2025-01-27 RX ORDER — LATANOPROST 50 UG/ML
1 SOLUTION/ DROPS OPHTHALMIC NIGHTLY
Status: DISCONTINUED | OUTPATIENT
Start: 2025-01-27 | End: 2025-01-28

## 2025-01-27 RX ORDER — ALPRAZOLAM 0.5 MG
0.5 TABLET ORAL 2 TIMES DAILY PRN
Status: DISCONTINUED | OUTPATIENT
Start: 2025-01-27 | End: 2025-01-28

## 2025-01-27 RX ORDER — AMIODARONE HYDROCHLORIDE 200 MG/1
400 TABLET ORAL 2 TIMES DAILY
Status: DISCONTINUED | OUTPATIENT
Start: 2025-01-27 | End: 2025-01-28

## 2025-01-27 RX ORDER — DEXMEDETOMIDINE HYDROCHLORIDE 4 UG/ML
.1-1.5 INJECTION, SOLUTION INTRAVENOUS ONCE
Status: COMPLETED | OUTPATIENT
Start: 2025-01-28 | End: 2025-01-28

## 2025-01-27 RX ORDER — BRIMONIDINE TARTRATE AND TIMOLOL MALEATE 2; 5 MG/ML; MG/ML
1 SOLUTION OPHTHALMIC EVERY 12 HOURS
Status: DISCONTINUED | OUTPATIENT
Start: 2025-01-27 | End: 2025-01-28

## 2025-01-27 RX ORDER — MIDAZOLAM HYDROCHLORIDE 1 MG/ML
INJECTION, SOLUTION INTRAMUSCULAR; INTRAVENOUS PRN
Status: COMPLETED | OUTPATIENT
Start: 2025-01-27 | End: 2025-01-27

## 2025-01-27 RX ORDER — POLYETHYLENE GLYCOL 3350 17 G/17G
17 POWDER, FOR SOLUTION ORAL DAILY PRN
Status: DISCONTINUED | OUTPATIENT
Start: 2025-01-27 | End: 2025-01-28

## 2025-01-27 RX ORDER — CYCLOSPORINE 0.5 MG/ML
1 EMULSION OPHTHALMIC 2 TIMES DAILY
COMMUNITY

## 2025-01-27 RX ORDER — AMLODIPINE BESYLATE 5 MG/1
5 TABLET ORAL DAILY
Status: DISCONTINUED | OUTPATIENT
Start: 2025-01-27 | End: 2025-01-28

## 2025-01-27 RX ADMIN — IOPAMIDOL 75 ML: 755 INJECTION, SOLUTION INTRAVENOUS at 08:20

## 2025-01-27 RX ADMIN — AMIODARONE HYDROCHLORIDE 400 MG: 200 TABLET ORAL at 13:22

## 2025-01-27 RX ADMIN — METOPROLOL TARTRATE 25 MG: 25 TABLET, FILM COATED ORAL at 21:09

## 2025-01-27 RX ADMIN — ATORVASTATIN CALCIUM 80 MG: 40 TABLET, FILM COATED ORAL at 21:10

## 2025-01-27 RX ADMIN — METOPROLOL TARTRATE 25 MG: 25 TABLET, FILM COATED ORAL at 13:11

## 2025-01-27 RX ADMIN — SODIUM CHLORIDE, PRESERVATIVE FREE 10 ML: 5 INJECTION INTRAVENOUS at 21:11

## 2025-01-27 RX ADMIN — AMLODIPINE BESYLATE 5 MG: 5 TABLET ORAL at 13:11

## 2025-01-27 RX ADMIN — MUPIROCIN: 20 OINTMENT TOPICAL at 21:11

## 2025-01-27 RX ADMIN — ASPIRIN 81 MG: 81 TABLET, CHEWABLE ORAL at 13:11

## 2025-01-27 RX ADMIN — FENTANYL CITRATE 50 MCG: 50 INJECTION, SOLUTION INTRAMUSCULAR; INTRAVENOUS at 10:28

## 2025-01-27 RX ADMIN — MIDAZOLAM 1 MG: 1 INJECTION INTRAMUSCULAR; INTRAVENOUS at 10:35

## 2025-01-27 RX ADMIN — MIDAZOLAM 1 MG: 1 INJECTION INTRAMUSCULAR; INTRAVENOUS at 10:28

## 2025-01-27 RX ADMIN — HYDRALAZINE HYDROCHLORIDE 10 MG: 20 INJECTION INTRAMUSCULAR; INTRAVENOUS at 13:11

## 2025-01-27 RX ADMIN — LIDOCAINE HYDROCHLORIDE 15 ML: 20 SOLUTION ORAL at 10:25

## 2025-01-27 RX ADMIN — ALPRAZOLAM 0.5 MG: 0.5 TABLET ORAL at 21:14

## 2025-01-27 RX ADMIN — METOPROLOL TARTRATE 25 MG: 25 TABLET, FILM COATED ORAL at 15:06

## 2025-01-27 RX ADMIN — AMIODARONE HYDROCHLORIDE 400 MG: 200 TABLET ORAL at 21:09

## 2025-01-27 NOTE — H&P
Rachel BRIGITTE Mcdaniel, 74 y.o., female    Primary care physician:  Arnoldo Wheeler MD     Chief Complaint: CAD aortic regurgitation mitral regurgitation    History of Present Illness: She is here for BELLE before plan CABG to evaluate further her aortic and mitral valve    Past medical history:    has a past medical history of Allergic rhinitis, Breast cancer (HCC), Depression, Family history of coronary artery disease, Fibroids, Frozen shoulder, GERD (gastroesophageal reflux disease), H/O cardiovascular stress test, H/O chest x-ray, H/O echocardiogram, History of exercise stress test, History of left heart catheterization (LHC), History of transesophageal echocardiography (BELLE), Hx of  Carotid Doppler ultrasound, Hyperlipidemia, Hypertension, Hypothyroidism, and VHD (valvular heart disease).  Past surgical history:   has a past surgical history that includes Mastectomy (11/2005); Hysterectomy (1984); other surgical history (12/12/08); Cholecystectomy (10/24/2007); Breast surgery (6/9/2006); Breast lumpectomy (, 1/18/02); Thyroidectomy; and Cardiac procedure (N/A, 1/23/2025).  Social History:   reports that she has never smoked. She has never used smokeless tobacco. She reports that she does not drink alcohol and does not use drugs.  Family history:  family history includes Heart Attack in her father, maternal grandfather, paternal uncle, paternal uncle, paternal uncle, and paternal uncle; Heart Surgery (age of onset: 72) in her father; High Cholesterol in her brother and father; Hypertension in her brother and father; Stroke in her mother.    Allergies:      Allergies   Allergen Reactions    Pcn [Penicillins] Hives    Macrobid [Nitrofurantoin Monohyd Macro]     Naproxen Nausea Only    Codeine Rash    Sulfamethazine Rash    Trimethoprim Rash       Home Medications:  Prior to Admission medications    Medication Sig Start Date End Date Taking? Authorizing Provider   amLODIPine (NORVASC) 5 MG tablet Take 1 tablet by mouth

## 2025-01-27 NOTE — TELEPHONE ENCOUNTER
Spoke to pt let her know, she would have her BELLE at 11. And that I would reach out to CT and ask them to call her with info regarding ultra sounds related to tomorrows surgery. I emailed Deepthi

## 2025-01-27 NOTE — H&P
Cardiothoracic Surgery  IN-PATIENT SERVICE   [facility]    CONSULTATION / HISTORY AND PHYSICAL EXAMINATION            Date:   1/27/2025  Patient name:  Rachel Mcdaniel  Date of admission:  1/27/2025 12:41 PM  MRN:   [id number]  Account:  [id number]  YOB: 1950  PCP:    Arnoldo Wheeler MD  Room:   2008/2008-A  Code Status:    Full Code    Physician Requesting Consult: Tunde Byrd,*    Reason for Consult:  triple vessel cad    Chief Complaint:     No cardiac symptoms    History of Present Illness:     Rachel is a 74 y.o. 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    Her TTE showed moderate AI and mild MR.  Pertinent PMH of radiation to Left chest and bilateral mastectomy in 2006  We were consulted for surgical evaluation    Past Medical History:     Past Medical History:   Diagnosis Date    Allergic rhinitis     Breast cancer (HCC) 11/2005, 10/2003, 1/18/2002    Depression     Family history of coronary artery disease     Fibroids 1984    heavy periods with fibroids    Frozen shoulder 12/12/2008    left    GERD (gastroesophageal reflux disease)     H/O cardiovascular stress test 12/03/2015    treadmill    H/O chest x-ray 05/17/2007    H/O echocardiogram 06/02/2011    mild diastolic dysfunction    History of exercise stress test 12/26/2019    treadmill  Negative for ischemia by diagnostic criteria, Frequent PAC during stress at peak exercise, no chest pain, heavy PAC burden seen in stress    History of left heart catheterization (LHC) 01/23/2025    History of transesophageal echocardiography (BELLE) 01/27/2025    Hx of  Carotid Doppler ultrasound 12/20/2016    Normal echo    Hyperlipidemia     Hypertension     Hypothyroidism     VHD (valvular heart disease)     Mitral valve disease        Past Surgical

## 2025-01-27 NOTE — TELEPHONE ENCOUNTER
Pt wants to talk to  about her proced for tomorrow 1/28  She is at the [facility] now and got her cta done and they told her that they could go ahead and do her ultra sound today also

## 2025-01-27 NOTE — ANESTHESIA PRE PROCEDURE
Department of Anesthesiology  Preprocedure Note       Name:  Rachel Mcdaniel   Age:  74 y.o.  :  1950                                          MRN:  [id number]         Date:  2025      Surgeon: Surgeon(s):  Tunde Byrd MD    Procedure: Procedure(s):  CORONARY ARTERY BYPASS GRAFT  AVR    Medications prior to admission:   Prior to Admission medications    Medication Sig Start Date End Date Taking? Authorizing Provider   amLODIPine (NORVASC) 5 MG tablet Take 1 tablet by mouth daily 25   Jeancarlos Solomon MD   Misc Natural Products (CHOLESTEROL SUPPORT PO) Take by mouth    Beulah López MD   aspirin 81 MG chewable tablet Take 1 tablet by mouth daily    Beulah López MD   metoprolol tartrate (LOPRESSOR) 25 MG tablet Take 1 tablet by mouth 2 times daily 25   Jennifer Travis APRN - CNP   brimonidine-timolol (COMBIGAN) 0.2-0.5 % ophthalmic solution INSTILL 1 DROP INTO BOTH   EYES TWICE DAILY 24   Beulah López MD   tolterodine (DETROL LA) 4 MG extended release capsule Take 1 capsule by mouth daily    Beulah López MD   FEXOFENADINE HCL PO Take by mouth    Beulah López MD   Multiple Vitamins-Minerals (PRESERVISION AREDS PO) Take by mouth    Beulah López MD   Omega-3 Fatty Acids (OMEGA 3 500 PO) Take by mouth daily    Beulah López MD   levothyroxine (SYNTHROID) 150 MCG tablet TAKE ONE TABLET DAILY BY MOUTH 10/20/16   Beulah López MD   latanoprost (XALATAN) 0.005 % ophthalmic solution 1 drop nightly    Beulah López MD       Current medications:    Current Facility-Administered Medications   Medication Dose Route Frequency Provider Last Rate Last Admin   • amLODIPine (NORVASC) tablet 5 mg  5 mg Oral Daily Marlen Cotter PA-C   5 mg at 25 1311   • aspirin chewable tablet 81 mg  81 mg Oral Daily Marlen Cotter PA-C   81 mg at 25 1311   • brimonidine-timolol (COMBIGAN) 0.2-0.5 % ophthalmic solution

## 2025-01-27 NOTE — PROGRESS NOTES
Bedside Spirometry    FVC               Actual  2.57   ---   97  %Predicted  FEV1             Actual   1.9  ---   93  %Predicted  FEV1/FVC     Actual   73.9  ---   95  %Predicted  FEF 25-75     Actual  1.36   ---  72   %Predicted      MACHINE INTERPRETATION:    Normal Spirometry    Results in soft chart.

## 2025-01-27 NOTE — PROGRESS NOTES
Spiritual Health History and Assessment/Progress Note  [facility]    Spiritual/Emotional Needs,  ,  ,      Name: Rachel Mcdaniel MRN: [id number]    Age: 74 y.o.     Sex: female   Language: English   Sikh: Zoroastrianism   Coronary artery disease (CAD) excluded     Date: 1/27/2025            Total Time Calculated: 14 min              Spiritual Assessment began in [facility] and the [facility] CV[facility]        Referral/Consult From: Family, Rounding   Encounter Overview/Reason: Spiritual/Emotional Needs  Service Provided For: Patient    Lili, Belief, Meaning:   Patient identifies as spiritual, is connected with a lili tradition or spiritual practice, and has beliefs or practices that help with coping during difficult times  Family/Friends identify as spiritual, are connected with a lili tradition or spiritual practice, and have beliefs or practices that help with coping during difficult times      Importance and Influence:  Patient has spiritual/personal beliefs that influence decisions regarding their health  Family/Friends have spiritual/personal beliefs that influence decisions regarding the patient's health    Community:  Patient is connected with a spiritual community and feels well-supported. Support system includes: Children, Lili Community, and Extended family  Family/Friends feel well-supported. Support system includes: Parent/s, Lili Community, and Extended family    Assessment and Plan of Care:     Patient Interventions include: Facilitated expression of thoughts and feelings  Family/Friends Interventions include: Facilitated expression of thoughts and feelings    Patient Plan of Care: Spiritual Care available upon further referral  Family/Friends Plan of Care: Spiritual Care available upon further referral    Electronically signed by Chaplain Elliot on 1/27/2025 at 2:26 PM

## 2025-01-27 NOTE — PROGRESS NOTES
4 Eyes Skin Assessment     NAME:  Rachel Mcdaniel  YOB: 1950  MEDICAL RECORD NUMBER:  [id number]    The patient is being assessed for  Admission    I agree that at least one RN has performed a thorough Head to Toe Skin Assessment on the patient. ALL assessment sites listed below have been assessed.      Areas assessed by both nurses:    Head, Face, Ears, Shoulders, Back, Chest, Arms, Elbows, Hands, Sacrum. Buttock, Coccyx, Ischium, Legs. Feet and Heels, and Under Medical Devices         Does the Patient have a Wound? No noted wound(s)       Mohsen Prevention initiated by RN: No  Wound Care Orders initiated by RN: No    Pressure Injury (Stage 3,4, Unstageable, DTI, NWPT, and Complex wounds) if present, place Wound referral order by RN under : No    New Ostomies, if present place, Ostomy referral order under : No     Nurse 1 eSignature: Electronically signed by Aby Antonio RN on 1/27/25 at 6:29 PM EST    **SHARE this note so that the co-signing nurse can place an eSignature**    Nurse 2 eSignature: Electronically signed by Beverly Caba RN on 1/27/25 at 7:40 PM EST

## 2025-01-28 ENCOUNTER — APPOINTMENT (OUTPATIENT)
Dept: GENERAL RADIOLOGY | Age: 75
DRG: 236 | End: 2025-01-28
Attending: THORACIC SURGERY (CARDIOTHORACIC VASCULAR SURGERY)
Payer: MEDICARE

## 2025-01-28 ENCOUNTER — ANESTHESIA (OUTPATIENT)
Dept: OPERATING ROOM | Age: 75
End: 2025-01-28
Payer: MEDICARE

## 2025-01-28 LAB
ANION GAP SERPL CALCULATED.3IONS-SCNC: 10 MMOL/L (ref 9–17)
ARTERIAL PATENCY WRIST A: NO
ARTERIAL PATENCY WRIST A: NO
BUN BLD-MCNC: 13 MG/DL (ref 7–20)
BUN BLD-MCNC: 16 MG/DL (ref 7–20)
BUN BLD-MCNC: 17 MG/DL (ref 7–20)
BUN BLD-MCNC: 18 MG/DL (ref 7–20)
BUN BLD-MCNC: 19 MG/DL (ref 7–20)
BUN BLD-MCNC: 19 MG/DL (ref 7–20)
BUN SERPL-MCNC: 16 MG/DL (ref 7–20)
CA-I BLD-SCNC: 1.08 MMOL/L (ref 1.15–1.33)
CA-I BLD-SCNC: 1.13 MMOL/L (ref 1.15–1.33)
CA-I BLD-SCNC: 1.14 MMOL/L (ref 1.15–1.33)
CA-I BLD-SCNC: 1.15 MMOL/L (ref 1.15–1.33)
CA-I BLD-SCNC: 1.17 MMOL/L (ref 1.15–1.33)
CA-I BLD-SCNC: 1.22 MMOL/L (ref 1.15–1.33)
CA-I BLD-SCNC: 1.23 MMOL/L (ref 1.15–1.33)
CA-I BLD-SCNC: 1.25 MMOL/L (ref 1.15–1.33)
CA-I BLD-SCNC: 1.27 MMOL/L (ref 1.15–1.33)
CA-I BLD-SCNC: 1.3 MMOL/L (ref 1.15–1.33)
CA-I BLD-SCNC: 1.36 MMOL/L (ref 1.15–1.33)
CA-I BLD-SCNC: 1.42 MMOL/L (ref 1.15–1.33)
CA-I BLD-SCNC: 1.5 MMOL/L (ref 1.15–1.33)
CALCIUM SERPL-MCNC: 8.3 MG/DL (ref 8.3–10.6)
CHLORIDE BLD-SCNC: 111 MMOL/L (ref 99–110)
CHLORIDE BLD-SCNC: 111 MMOL/L (ref 99–110)
CHLORIDE BLD-SCNC: 114 MMOL/L (ref 99–110)
CHLORIDE BLD-SCNC: 116 MMOL/L (ref 99–110)
CHLORIDE BLD-SCNC: 117 MMOL/L (ref 99–110)
CHLORIDE BLD-SCNC: 117 MMOL/L (ref 99–110)
CHLORIDE BLD-SCNC: 118 MMOL/L (ref 99–110)
CHLORIDE BLD-SCNC: 119 MMOL/L (ref 99–110)
CHLORIDE BLD-SCNC: 120 MMOL/L (ref 99–110)
CHLORIDE SERPL-SCNC: 118 MMOL/L (ref 99–110)
CO2 SERPL-SCNC: 19 MMOL/L (ref 21–32)
CREAT BLD-MCNC: 0.7 MG/DL (ref 0.5–1.2)
CREAT BLD-MCNC: 0.8 MG/DL (ref 0.5–1.2)
CREAT BLD-MCNC: 0.9 MG/DL (ref 0.5–1.2)
CREAT BLD-MCNC: 0.9 MG/DL (ref 0.5–1.2)
CREAT BLD-MCNC: 1 MG/DL (ref 0.5–1.2)
CREAT SERPL-MCNC: 0.6 MG/DL (ref 0.6–1.2)
EGFR, POC: 59 ML/MIN/1.73M2
EGFR, POC: 68 ML/MIN/1.73M2
EGFR, POC: 68 ML/MIN/1.73M2
EGFR, POC: 78 ML/MIN/1.73M2
EGFR, POC: >90 ML/MIN/1.73M2
ERYTHROCYTE [DISTWIDTH] IN BLOOD BY AUTOMATED COUNT: 13.2 % (ref 11.7–14.9)
FIBRINOGEN PPP-MCNC: 177 MG/DL (ref 170–540)
GAS FLOW.O2 O2 DELIVERY SYS: NORMAL L/MIN
GAS FLOW.O2 O2 DELIVERY SYS: NORMAL L/MIN
GFR, ESTIMATED: >90 ML/MIN/1.73M2
GLUCOSE BLD-MCNC: 106 MG/DL (ref 74–99)
GLUCOSE BLD-MCNC: 108 MG/DL (ref 74–99)
GLUCOSE BLD-MCNC: 119 MG/DL (ref 74–99)
GLUCOSE BLD-MCNC: 121 MG/DL (ref 74–99)
GLUCOSE BLD-MCNC: 136 MG/DL (ref 74–99)
GLUCOSE BLD-MCNC: 144 MG/DL (ref 74–99)
GLUCOSE BLD-MCNC: 144 MG/DL (ref 74–99)
GLUCOSE BLD-MCNC: 158 MG/DL (ref 74–99)
GLUCOSE BLD-MCNC: 158 MG/DL (ref 74–99)
GLUCOSE BLD-MCNC: 161 MG/DL (ref 74–99)
GLUCOSE BLD-MCNC: 164 MG/DL (ref 74–99)
GLUCOSE BLD-MCNC: 165 MG/DL (ref 74–99)
GLUCOSE BLD-MCNC: 188 MG/DL (ref 74–99)
GLUCOSE BLD-MCNC: 205 MG/DL (ref 74–99)
GLUCOSE SERPL-MCNC: 115 MG/DL (ref 74–99)
HCT VFR BLD AUTO: 23 % (ref 38–51)
HCT VFR BLD AUTO: 24 % (ref 38–51)
HCT VFR BLD AUTO: 25 % (ref 38–51)
HCT VFR BLD AUTO: 25 % (ref 38–51)
HCT VFR BLD AUTO: 26 % (ref 38–51)
HCT VFR BLD AUTO: 26 % (ref 38–51)
HCT VFR BLD AUTO: 27 % (ref 38–51)
HCT VFR BLD AUTO: 28 % (ref 38–51)
HCT VFR BLD AUTO: 29.9 % (ref 37–47)
HCT VFR BLD AUTO: 36 % (ref 38–51)
HGB BLD-MCNC: 9.6 G/DL (ref 12.5–16)
MAGNESIUM SERPL-MCNC: 2.2 MG/DL (ref 1.8–2.4)
MCH RBC QN AUTO: 29.7 PG (ref 27–31)
MCHC RBC AUTO-ENTMCNC: 32.1 G/DL (ref 32–36)
MCV RBC AUTO: 92.6 FL (ref 78–100)
NEGATIVE BASE EXCESS, ART: 1.9 MMOL/L (ref 0–3)
NEGATIVE BASE EXCESS, ART: 2.4 MMOL/L (ref 0–3)
NEGATIVE BASE EXCESS, ART: 2.5 MMOL/L (ref 0–3)
NEGATIVE BASE EXCESS, ART: 2.5 MMOL/L (ref 0–3)
NEGATIVE BASE EXCESS, ART: 3.2 MMOL/L (ref 0–3)
NEGATIVE BASE EXCESS, ART: 3.3 MMOL/L (ref 0–3)
NEGATIVE BASE EXCESS, ART: 4.1 MMOL/L (ref 0–3)
NEGATIVE BASE EXCESS, ART: 4.9 MMOL/L (ref 0–3)
NEGATIVE BASE EXCESS, ART: 4.9 MMOL/L (ref 0–3)
NEGATIVE BASE EXCESS, ART: 5.8 MMOL/L (ref 0–3)
NEGATIVE BASE EXCESS, ART: 6 MMOL/L (ref 0–3)
NEGATIVE BASE EXCESS, ART: 6.7 MMOL/L (ref 0–3)
OXYHGB MFR BLD: 64.4 %
OXYHGB MFR BLD: 70.3 %
PARTIAL THROMBOPLASTIN TIME: 33 SEC (ref 25.1–37.1)
PEEP RESPIRATORY: 5 CM[H2O]
PHOSPHATE SERPL-MCNC: 3 MG/DL (ref 2.5–4.9)
PLATELET # BLD AUTO: 130 K/UL (ref 140–440)
PMV BLD AUTO: 10.3 FL (ref 7.5–11.1)
POC ANION GAP: 10 MMOL/L (ref 7–16)
POC ANION GAP: 10 MMOL/L (ref 7–16)
POC ANION GAP: 11 MMOL/L (ref 7–16)
POC ANION GAP: 13 MMOL/L (ref 7–16)
POC ANION GAP: 14 MMOL/L (ref 7–16)
POC ANION GAP: 15 MMOL/L (ref 7–16)
POC ANION GAP: 8 MMOL/L (ref 7–16)
POC ANION GAP: 8 MMOL/L (ref 7–16)
POC ANION GAP: 9 MMOL/L (ref 7–16)
POC ANION GAP: 9 MMOL/L (ref 7–16)
POC HCO3: 18.6 MMOL/L (ref 21–28)
POC HCO3: 19.5 MMOL/L (ref 21–28)
POC HCO3: 19.6 MMOL/L (ref 21–28)
POC HCO3: 19.7 MMOL/L (ref 21–28)
POC HCO3: 20.6 MMOL/L (ref 21–28)
POC HCO3: 21.1 MMOL/L (ref 21–28)
POC HCO3: 21.4 MMOL/L (ref 21–28)
POC HCO3: 21.5 MMOL/L (ref 21–28)
POC HCO3: 22.3 MMOL/L (ref 21–28)
POC HCO3: 22.5 MMOL/L (ref 21–28)
POC HCO3: 23 MMOL/L (ref 21–28)
POC HCO3: 23.9 MMOL/L (ref 21–28)
POC HEMOGLOBIN (CALC): 12.2 G/DL (ref 12–17)
POC HEMOGLOBIN (CALC): 7.9 G/DL (ref 12–17)
POC HEMOGLOBIN (CALC): 8.1 G/DL (ref 12–17)
POC HEMOGLOBIN (CALC): 8.1 G/DL (ref 12–17)
POC HEMOGLOBIN (CALC): 8.2 G/DL (ref 12–17)
POC HEMOGLOBIN (CALC): 8.3 G/DL (ref 12–17)
POC HEMOGLOBIN (CALC): 8.4 G/DL (ref 12–17)
POC HEMOGLOBIN (CALC): 8.6 G/DL (ref 12–17)
POC HEMOGLOBIN (CALC): 8.7 G/DL (ref 12–17)
POC HEMOGLOBIN (CALC): 9 G/DL (ref 12–17)
POC HEMOGLOBIN (CALC): 9 G/DL (ref 12–17)
POC HEMOGLOBIN (CALC): 9.5 G/DL (ref 12–17)
POC O2 SATURATION: 100 % (ref 94–98)
POC O2 SATURATION: 100 % (ref 94–98)
POC O2 SATURATION: 92 % (ref 94–98)
POC O2 SATURATION: 96.4 % (ref 94–98)
POC O2 SATURATION: 97.9 % (ref 94–98)
POC O2 SATURATION: 98.5 % (ref 94–98)
POC O2 SATURATION: 98.6 % (ref 94–98)
POC O2 SATURATION: 99.8 % (ref 94–98)
POC O2 SATURATION: 99.9 % (ref 94–98)
POC PCO2: 31 MM HG (ref 35–48)
POC PCO2: 32 MM HG (ref 35–48)
POC PCO2: 32.5 MM HG (ref 35–48)
POC PCO2: 36 MM HG (ref 35–48)
POC PCO2: 36.4 MM HG (ref 35–48)
POC PCO2: 38.6 MM HG (ref 35–48)
POC PCO2: 38.7 MM HG (ref 35–48)
POC PCO2: 40.6 MM HG (ref 35–48)
POC PCO2: 41.6 MM HG (ref 35–48)
POC PCO2: 41.9 MM HG (ref 35–48)
POC PCO2: 42 MM HG (ref 35–48)
POC PCO2: 44.7 MM HG (ref 35–48)
POC PH: 7.28 (ref 7.35–7.45)
POC PH: 7.32 (ref 7.35–7.45)
POC PH: 7.33 (ref 7.35–7.45)
POC PH: 7.33 (ref 7.35–7.45)
POC PH: 7.34 (ref 7.35–7.45)
POC PH: 7.34 (ref 7.35–7.45)
POC PH: 7.35 (ref 7.35–7.45)
POC PH: 7.37 (ref 7.35–7.45)
POC PH: 7.38 (ref 7.35–7.45)
POC PH: 7.39 (ref 7.35–7.45)
POC PH: 7.39 (ref 7.35–7.45)
POC PH: 7.44 (ref 7.35–7.45)
POC PO2: 108.3 MM HG (ref 83–108)
POC PO2: 110 MM HG (ref 83–108)
POC PO2: 123.7 MM HG (ref 83–108)
POC PO2: 228.4 MM HG (ref 83–108)
POC PO2: 258.9 MM HG (ref 83–108)
POC PO2: 271.7 MM HG (ref 83–108)
POC PO2: 287.6 MM HG (ref 83–108)
POC PO2: 379 MM HG (ref 83–108)
POC PO2: 425.4 MM HG (ref 83–108)
POC PO2: 460.4 MM HG (ref 83–108)
POC PO2: 71.9 MM HG (ref 83–108)
POC PO2: 90.3 MM HG (ref 83–108)
POTASSIUM BLD-SCNC: 2.9 MMOL/L (ref 3.5–5.1)
POTASSIUM BLD-SCNC: 3.1 MMOL/L (ref 3.5–5.1)
POTASSIUM BLD-SCNC: 3.1 MMOL/L (ref 3.5–5.1)
POTASSIUM BLD-SCNC: 3.4 MMOL/L (ref 3.5–5.1)
POTASSIUM BLD-SCNC: 3.5 MMOL/L (ref 3.5–5.1)
POTASSIUM BLD-SCNC: 3.7 MMOL/L (ref 3.5–5.1)
POTASSIUM BLD-SCNC: 3.8 MMOL/L (ref 3.5–5.1)
POTASSIUM BLD-SCNC: 4 MMOL/L (ref 3.5–5.1)
POTASSIUM BLD-SCNC: 4.2 MMOL/L (ref 3.5–5.1)
POTASSIUM BLD-SCNC: 4.3 MMOL/L (ref 3.5–5.1)
POTASSIUM BLD-SCNC: 4.6 MMOL/L (ref 3.5–5.1)
POTASSIUM BLD-SCNC: 4.8 MMOL/L (ref 3.5–5.1)
POTASSIUM SERPL-SCNC: 3.3 MMOL/L (ref 3.5–5.1)
POTASSIUM SERPL-SCNC: 3.6 MMOL/L (ref 3.5–5.1)
RBC # BLD AUTO: 3.23 M/UL (ref 4.2–5.4)
SODIUM BLD-SCNC: 143 MMOL/L (ref 136–145)
SODIUM BLD-SCNC: 145 MMOL/L (ref 136–145)
SODIUM BLD-SCNC: 145 MMOL/L (ref 136–145)
SODIUM BLD-SCNC: 146 MMOL/L (ref 136–145)
SODIUM BLD-SCNC: 147 MMOL/L (ref 136–145)
SODIUM BLD-SCNC: 148 MMOL/L (ref 136–145)
SODIUM BLD-SCNC: 149 MMOL/L (ref 136–145)
SODIUM BLD-SCNC: 151 MMOL/L (ref 136–145)
SODIUM BLD-SCNC: 153 MMOL/L (ref 136–145)
SODIUM BLD-SCNC: 154 MMOL/L (ref 136–145)
SODIUM SERPL-SCNC: 147 MMOL/L (ref 136–145)
TCO2 (CALC), ART: 20 MMOL/L (ref 21–32)
TCO2 (CALC), ART: 21 MMOL/L (ref 21–32)
TCO2 (CALC), ART: 22 MMOL/L (ref 21–32)
TCO2 (CALC), ART: 22 MMOL/L (ref 21–32)
TCO2 (CALC), ART: 23 MMOL/L (ref 21–32)
TCO2 (CALC), ART: 24 MMOL/L (ref 21–32)
TCO2 (CALC), ART: 24 MMOL/L (ref 21–32)
TCO2 (CALC), ART: 25 MMOL/L (ref 21–32)
TEXT FOR RESPIRATORY: NORMAL
TEXT FOR RESPIRATORY: NORMAL
WBC OTHER # BLD: 20.3 K/UL (ref 4–10.5)

## 2025-01-28 PROCEDURE — 2500000003 HC RX 250 WO HCPCS: Performed by: PHYSICIAN ASSISTANT

## 2025-01-28 PROCEDURE — 2100000000 HC CCU R&B

## 2025-01-28 PROCEDURE — 84132 ASSAY OF SERUM POTASSIUM: CPT

## 2025-01-28 PROCEDURE — 6360000002 HC RX W HCPCS: Performed by: THORACIC SURGERY (CARDIOTHORACIC VASCULAR SURGERY)

## 2025-01-28 PROCEDURE — P9047 ALBUMIN (HUMAN), 25%, 50ML: HCPCS

## 2025-01-28 PROCEDURE — B24BZZ4 ULTRASONOGRAPHY OF HEART WITH AORTA, TRANSESOPHAGEAL: ICD-10-PCS | Performed by: THORACIC SURGERY (CARDIOTHORACIC VASCULAR SURGERY)

## 2025-01-28 PROCEDURE — 2580000003 HC RX 258: Performed by: THORACIC SURGERY (CARDIOTHORACIC VASCULAR SURGERY)

## 2025-01-28 PROCEDURE — C1768 GRAFT, VASCULAR: HCPCS | Performed by: THORACIC SURGERY (CARDIOTHORACIC VASCULAR SURGERY)

## 2025-01-28 PROCEDURE — 80048 BASIC METABOLIC PNL TOTAL CA: CPT

## 2025-01-28 PROCEDURE — 06BQ4ZZ EXCISION OF LEFT SAPHENOUS VEIN, PERCUTANEOUS ENDOSCOPIC APPROACH: ICD-10-PCS | Performed by: THORACIC SURGERY (CARDIOTHORACIC VASCULAR SURGERY)

## 2025-01-28 PROCEDURE — A4648 IMPLANTABLE TISSUE MARKER: HCPCS | Performed by: THORACIC SURGERY (CARDIOTHORACIC VASCULAR SURGERY)

## 2025-01-28 PROCEDURE — 2580000003 HC RX 258: Performed by: PHYSICIAN ASSISTANT

## 2025-01-28 PROCEDURE — 3700000001 HC ADD 15 MINUTES (ANESTHESIA): Performed by: THORACIC SURGERY (CARDIOTHORACIC VASCULAR SURGERY)

## 2025-01-28 PROCEDURE — 33268 EXCL LAA OPN OTH PX ANY METH: CPT | Performed by: THORACIC SURGERY (CARDIOTHORACIC VASCULAR SURGERY)

## 2025-01-28 PROCEDURE — 6370000000 HC RX 637 (ALT 250 FOR IP): Performed by: PHYSICIAN ASSISTANT

## 2025-01-28 PROCEDURE — 3700000000 HC ANESTHESIA ATTENDED CARE: Performed by: THORACIC SURGERY (CARDIOTHORACIC VASCULAR SURGERY)

## 2025-01-28 PROCEDURE — 94640 AIRWAY INHALATION TREATMENT: CPT

## 2025-01-28 PROCEDURE — 2720000010 HC SURG SUPPLY STERILE: Performed by: THORACIC SURGERY (CARDIOTHORACIC VASCULAR SURGERY)

## 2025-01-28 PROCEDURE — 84100 ASSAY OF PHOSPHORUS: CPT

## 2025-01-28 PROCEDURE — 2500000003 HC RX 250 WO HCPCS: Performed by: NURSE ANESTHETIST, CERTIFIED REGISTERED

## 2025-01-28 PROCEDURE — 2500000003 HC RX 250 WO HCPCS: Performed by: THORACIC SURGERY (CARDIOTHORACIC VASCULAR SURGERY)

## 2025-01-28 PROCEDURE — 6360000002 HC RX W HCPCS

## 2025-01-28 PROCEDURE — 7100000000 HC PACU RECOVERY - FIRST 15 MIN

## 2025-01-28 PROCEDURE — 33518 CABG ARTERY-VEIN TWO: CPT | Performed by: THORACIC SURGERY (CARDIOTHORACIC VASCULAR SURGERY)

## 2025-01-28 PROCEDURE — 33533 CABG ARTERIAL SINGLE: CPT | Performed by: THORACIC SURGERY (CARDIOTHORACIC VASCULAR SURGERY)

## 2025-01-28 PROCEDURE — 83735 ASSAY OF MAGNESIUM: CPT

## 2025-01-28 PROCEDURE — C1729 CATH, DRAINAGE: HCPCS | Performed by: THORACIC SURGERY (CARDIOTHORACIC VASCULAR SURGERY)

## 2025-01-28 PROCEDURE — 85347 COAGULATION TIME ACTIVATED: CPT

## 2025-01-28 PROCEDURE — 71045 X-RAY EXAM CHEST 1 VIEW: CPT

## 2025-01-28 PROCEDURE — 0212093 BYPASS CORONARY ARTERY, THREE ARTERIES FROM CORONARY ARTERY WITH AUTOLOGOUS VENOUS TISSUE, OPEN APPROACH: ICD-10-PCS | Performed by: THORACIC SURGERY (CARDIOTHORACIC VASCULAR SURGERY)

## 2025-01-28 PROCEDURE — 6370000000 HC RX 637 (ALT 250 FOR IP): Performed by: THORACIC SURGERY (CARDIOTHORACIC VASCULAR SURGERY)

## 2025-01-28 PROCEDURE — 82805 BLOOD GASES W/O2 SATURATION: CPT

## 2025-01-28 PROCEDURE — 2709999900 HC NON-CHARGEABLE SUPPLY: Performed by: THORACIC SURGERY (CARDIOTHORACIC VASCULAR SURGERY)

## 2025-01-28 PROCEDURE — C1751 CATH, INF, PER/CENT/MIDLINE: HCPCS | Performed by: THORACIC SURGERY (CARDIOTHORACIC VASCULAR SURGERY)

## 2025-01-28 PROCEDURE — P9045 ALBUMIN (HUMAN), 5%, 250 ML: HCPCS | Performed by: NURSE ANESTHETIST, CERTIFIED REGISTERED

## 2025-01-28 PROCEDURE — 85014 HEMATOCRIT: CPT

## 2025-01-28 PROCEDURE — 2700000000 HC OXYGEN THERAPY PER DAY

## 2025-01-28 PROCEDURE — 2500000003 HC RX 250 WO HCPCS

## 2025-01-28 PROCEDURE — 5A0945A ASSISTANCE WITH RESPIRATORY VENTILATION, 24-96 CONSECUTIVE HOURS, HIGH NASAL FLOW/VELOCITY: ICD-10-PCS | Performed by: THORACIC SURGERY (CARDIOTHORACIC VASCULAR SURGERY)

## 2025-01-28 PROCEDURE — 82962 GLUCOSE BLOOD TEST: CPT

## 2025-01-28 PROCEDURE — 2580000003 HC RX 258: Performed by: NURSE ANESTHETIST, CERTIFIED REGISTERED

## 2025-01-28 PROCEDURE — 6360000002 HC RX W HCPCS: Performed by: PHYSICIAN ASSISTANT

## 2025-01-28 PROCEDURE — 94761 N-INVAS EAR/PLS OXIMETRY MLT: CPT

## 2025-01-28 PROCEDURE — 82803 BLOOD GASES ANY COMBINATION: CPT

## 2025-01-28 PROCEDURE — 85730 THROMBOPLASTIN TIME PARTIAL: CPT

## 2025-01-28 PROCEDURE — 33509 NDSC HRV UXTR ART 1 SGM CAB: CPT | Performed by: THORACIC SURGERY (CARDIOTHORACIC VASCULAR SURGERY)

## 2025-01-28 PROCEDURE — 82330 ASSAY OF CALCIUM: CPT

## 2025-01-28 PROCEDURE — C1713 ANCHOR/SCREW BN/BN,TIS/BN: HCPCS | Performed by: THORACIC SURGERY (CARDIOTHORACIC VASCULAR SURGERY)

## 2025-01-28 PROCEDURE — 85027 COMPLETE CBC AUTOMATED: CPT

## 2025-01-28 PROCEDURE — 6360000002 HC RX W HCPCS: Performed by: NURSE ANESTHETIST, CERTIFIED REGISTERED

## 2025-01-28 PROCEDURE — 3600000018 HC SURGERY OHS ADDTL 15MIN: Performed by: THORACIC SURGERY (CARDIOTHORACIC VASCULAR SURGERY)

## 2025-01-28 PROCEDURE — 80047 BASIC METABLC PNL IONIZED CA: CPT

## 2025-01-28 PROCEDURE — 94002 VENT MGMT INPAT INIT DAY: CPT

## 2025-01-28 PROCEDURE — 3600000008 HC SURGERY OHS BASE: Performed by: THORACIC SURGERY (CARDIOTHORACIC VASCULAR SURGERY)

## 2025-01-28 PROCEDURE — C1889 IMPLANT/INSERT DEVICE, NOC: HCPCS | Performed by: THORACIC SURGERY (CARDIOTHORACIC VASCULAR SURGERY)

## 2025-01-28 PROCEDURE — C1894 INTRO/SHEATH, NON-LASER: HCPCS | Performed by: THORACIC SURGERY (CARDIOTHORACIC VASCULAR SURGERY)

## 2025-01-28 PROCEDURE — 33508 ENDOSCOPIC VEIN HARVEST: CPT | Performed by: THORACIC SURGERY (CARDIOTHORACIC VASCULAR SURGERY)

## 2025-01-28 PROCEDURE — 85384 FIBRINOGEN ACTIVITY: CPT

## 2025-01-28 PROCEDURE — 02L70CK OCCLUSION OF LEFT ATRIAL APPENDAGE WITH EXTRALUMINAL DEVICE, OPEN APPROACH: ICD-10-PCS | Performed by: THORACIC SURGERY (CARDIOTHORACIC VASCULAR SURGERY)

## 2025-01-28 PROCEDURE — 03BC4ZZ EXCISION OF LEFT RADIAL ARTERY, PERCUTANEOUS ENDOSCOPIC APPROACH: ICD-10-PCS | Performed by: THORACIC SURGERY (CARDIOTHORACIC VASCULAR SURGERY)

## 2025-01-28 DEVICE — APPLIER CLIP MED SUTURE LESS 45 MM SYS 1 HND STRL ATRICLIP FLX V: Type: IMPLANTABLE DEVICE | Site: CHEST | Status: FUNCTIONAL

## 2025-01-28 RX ORDER — ENOXAPARIN SODIUM 100 MG/ML
40 INJECTION SUBCUTANEOUS DAILY
Status: DISCONTINUED | OUTPATIENT
Start: 2025-01-29 | End: 2025-02-02

## 2025-01-28 RX ORDER — GINSENG 100 MG
CAPSULE ORAL
Status: COMPLETED | OUTPATIENT
Start: 2025-01-28 | End: 2025-01-28

## 2025-01-28 RX ORDER — GABAPENTIN 300 MG/1
300 CAPSULE ORAL 3 TIMES DAILY
Status: DISCONTINUED | OUTPATIENT
Start: 2025-01-28 | End: 2025-02-01

## 2025-01-28 RX ORDER — PROTAMINE SULFATE 10 MG/ML
INJECTION, SOLUTION INTRAVENOUS
Status: DISCONTINUED | OUTPATIENT
Start: 2025-01-28 | End: 2025-01-28 | Stop reason: SDUPTHER

## 2025-01-28 RX ORDER — GUAIFENESIN 600 MG/1
1200 TABLET, EXTENDED RELEASE ORAL 2 TIMES DAILY
Status: DISCONTINUED | OUTPATIENT
Start: 2025-01-28 | End: 2025-02-05 | Stop reason: HOSPADM

## 2025-01-28 RX ORDER — SODIUM CHLORIDE 0.9 % (FLUSH) 0.9 %
5-40 SYRINGE (ML) INJECTION PRN
Status: DISCONTINUED | OUTPATIENT
Start: 2025-01-28 | End: 2025-02-05 | Stop reason: HOSPADM

## 2025-01-28 RX ORDER — PROTAMINE SULFATE 10 MG/ML
50 INJECTION, SOLUTION INTRAVENOUS
Status: ACTIVE | OUTPATIENT
Start: 2025-01-28 | End: 2025-01-29

## 2025-01-28 RX ORDER — ALPRAZOLAM 0.25 MG/1
0.25 TABLET ORAL EVERY 4 HOURS PRN
Status: DISCONTINUED | OUTPATIENT
Start: 2025-01-28 | End: 2025-02-05 | Stop reason: HOSPADM

## 2025-01-28 RX ORDER — NOREPINEPHRINE BITARTRATE 0.06 MG/ML
1-100 INJECTION, SOLUTION INTRAVENOUS CONTINUOUS PRN
Status: DISCONTINUED | OUTPATIENT
Start: 2025-01-28 | End: 2025-02-05 | Stop reason: HOSPADM

## 2025-01-28 RX ORDER — PROPOFOL 10 MG/ML
INJECTION, EMULSION INTRAVENOUS
Status: DISCONTINUED | OUTPATIENT
Start: 2025-01-28 | End: 2025-01-28 | Stop reason: SDUPTHER

## 2025-01-28 RX ORDER — ONDANSETRON 4 MG/1
4 TABLET, ORALLY DISINTEGRATING ORAL EVERY 8 HOURS PRN
Status: DISCONTINUED | OUTPATIENT
Start: 2025-01-28 | End: 2025-01-28

## 2025-01-28 RX ORDER — MIDAZOLAM HYDROCHLORIDE 1 MG/ML
INJECTION, SOLUTION INTRAMUSCULAR; INTRAVENOUS
Status: DISCONTINUED | OUTPATIENT
Start: 2025-01-28 | End: 2025-01-28 | Stop reason: SDUPTHER

## 2025-01-28 RX ORDER — POTASSIUM CHLORIDE 29.8 MG/ML
20 INJECTION INTRAVENOUS PRN
Status: DISCONTINUED | OUTPATIENT
Start: 2025-01-28 | End: 2025-02-05 | Stop reason: HOSPADM

## 2025-01-28 RX ORDER — HEPARIN SODIUM 1000 [USP'U]/ML
INJECTION, SOLUTION INTRAVENOUS; SUBCUTANEOUS
Status: DISCONTINUED | OUTPATIENT
Start: 2025-01-28 | End: 2025-01-28 | Stop reason: SDUPTHER

## 2025-01-28 RX ORDER — TRAMADOL HYDROCHLORIDE 50 MG/1
50 TABLET ORAL EVERY 6 HOURS PRN
Status: DISCONTINUED | OUTPATIENT
Start: 2025-01-28 | End: 2025-02-01

## 2025-01-28 RX ORDER — SODIUM CHLORIDE 9 MG/ML
INJECTION, SOLUTION INTRAVENOUS CONTINUOUS
Status: DISCONTINUED | OUTPATIENT
Start: 2025-01-28 | End: 2025-01-28 | Stop reason: ALTCHOICE

## 2025-01-28 RX ORDER — MUPIROCIN 20 MG/G
OINTMENT TOPICAL 2 TIMES DAILY
Status: COMPLETED | OUTPATIENT
Start: 2025-01-28 | End: 2025-02-02

## 2025-01-28 RX ORDER — FAMOTIDINE 20 MG/1
20 TABLET, FILM COATED ORAL
Status: DISCONTINUED | OUTPATIENT
Start: 2025-01-28 | End: 2025-02-05 | Stop reason: HOSPADM

## 2025-01-28 RX ORDER — GINSENG 100 MG
CAPSULE ORAL 2 TIMES DAILY
Status: DISCONTINUED | OUTPATIENT
Start: 2025-01-30 | End: 2025-02-05 | Stop reason: HOSPADM

## 2025-01-28 RX ORDER — FENTANYL CITRATE 0.05 MG/ML
25 INJECTION, SOLUTION INTRAMUSCULAR; INTRAVENOUS
Status: DISCONTINUED | OUTPATIENT
Start: 2025-01-28 | End: 2025-01-28

## 2025-01-28 RX ORDER — ATORVASTATIN CALCIUM 40 MG/1
40 TABLET, FILM COATED ORAL NIGHTLY
Status: DISCONTINUED | OUTPATIENT
Start: 2025-01-29 | End: 2025-02-05 | Stop reason: HOSPADM

## 2025-01-28 RX ORDER — M-VIT,TX,IRON,MINS/CALC/FOLIC 27MG-0.4MG
1 TABLET ORAL
Status: DISCONTINUED | OUTPATIENT
Start: 2025-01-29 | End: 2025-02-05 | Stop reason: HOSPADM

## 2025-01-28 RX ORDER — EPINEPHRINE 1 MG/ML
INJECTION INTRAMUSCULAR; INTRAVENOUS; SUBCUTANEOUS
Status: DISCONTINUED | OUTPATIENT
Start: 2025-01-28 | End: 2025-01-28 | Stop reason: SDUPTHER

## 2025-01-28 RX ORDER — FENTANYL CITRATE 0.05 MG/ML
INJECTION, SOLUTION INTRAMUSCULAR; INTRAVENOUS
Status: DISCONTINUED | OUTPATIENT
Start: 2025-01-28 | End: 2025-01-28 | Stop reason: SDUPTHER

## 2025-01-28 RX ORDER — DEXTROSE MONOHYDRATE AND SODIUM CHLORIDE 5; .45 G/100ML; G/100ML
INJECTION, SOLUTION INTRAVENOUS CONTINUOUS
Status: DISCONTINUED | OUTPATIENT
Start: 2025-01-28 | End: 2025-01-29

## 2025-01-28 RX ORDER — DEXAMETHASONE SODIUM PHOSPHATE 4 MG/ML
INJECTION, SOLUTION INTRA-ARTICULAR; INTRALESIONAL; INTRAMUSCULAR; INTRAVENOUS; SOFT TISSUE
Status: DISCONTINUED | OUTPATIENT
Start: 2025-01-28 | End: 2025-01-28 | Stop reason: SDUPTHER

## 2025-01-28 RX ORDER — GLUCAGON 1 MG/ML
1 KIT INJECTION PRN
Status: DISCONTINUED | OUTPATIENT
Start: 2025-01-28 | End: 2025-02-05 | Stop reason: HOSPADM

## 2025-01-28 RX ORDER — POTASSIUM CHLORIDE 1500 MG/1
40 TABLET, EXTENDED RELEASE ORAL PRN
Status: DISCONTINUED | OUTPATIENT
Start: 2025-01-28 | End: 2025-02-05 | Stop reason: HOSPADM

## 2025-01-28 RX ORDER — SODIUM CHLORIDE 0.9 % (FLUSH) 0.9 %
5-40 SYRINGE (ML) INJECTION EVERY 12 HOURS SCHEDULED
Status: DISCONTINUED | OUTPATIENT
Start: 2025-01-28 | End: 2025-02-05 | Stop reason: HOSPADM

## 2025-01-28 RX ORDER — ASPIRIN 81 MG/1
81 TABLET, CHEWABLE ORAL DAILY
Status: DISCONTINUED | OUTPATIENT
Start: 2025-01-29 | End: 2025-02-05 | Stop reason: HOSPADM

## 2025-01-28 RX ORDER — CALCIUM CHLORIDE 100 MG/ML
INJECTION INTRAVENOUS; INTRAVENTRICULAR
Status: DISCONTINUED | OUTPATIENT
Start: 2025-01-28 | End: 2025-01-28 | Stop reason: SDUPTHER

## 2025-01-28 RX ORDER — MAGNESIUM SULFATE IN WATER 40 MG/ML
2000 INJECTION, SOLUTION INTRAVENOUS PRN
Status: DISCONTINUED | OUTPATIENT
Start: 2025-01-28 | End: 2025-02-05 | Stop reason: HOSPADM

## 2025-01-28 RX ORDER — ALBUMIN HUMAN 50 G/1000ML
SOLUTION INTRAVENOUS
Status: DISCONTINUED | OUTPATIENT
Start: 2025-01-28 | End: 2025-01-28 | Stop reason: SDUPTHER

## 2025-01-28 RX ORDER — ALBUMIN HUMAN 50 G/1000ML
12.5 SOLUTION INTRAVENOUS PRN
Status: DISCONTINUED | OUTPATIENT
Start: 2025-01-28 | End: 2025-02-05 | Stop reason: HOSPADM

## 2025-01-28 RX ORDER — OXYCODONE HYDROCHLORIDE 5 MG/1
5 TABLET ORAL EVERY 4 HOURS PRN
Status: DISCONTINUED | OUTPATIENT
Start: 2025-01-28 | End: 2025-01-28

## 2025-01-28 RX ORDER — SENNA AND DOCUSATE SODIUM 50; 8.6 MG/1; MG/1
1 TABLET, FILM COATED ORAL 2 TIMES DAILY
Status: DISCONTINUED | OUTPATIENT
Start: 2025-01-28 | End: 2025-02-05 | Stop reason: HOSPADM

## 2025-01-28 RX ORDER — BISACODYL 10 MG
10 SUPPOSITORY, RECTAL RECTAL DAILY PRN
Status: DISCONTINUED | OUTPATIENT
Start: 2025-01-28 | End: 2025-02-05 | Stop reason: HOSPADM

## 2025-01-28 RX ORDER — DEXTROSE MONOHYDRATE 100 MG/ML
INJECTION, SOLUTION INTRAVENOUS CONTINUOUS PRN
Status: DISCONTINUED | OUTPATIENT
Start: 2025-01-28 | End: 2025-02-05 | Stop reason: HOSPADM

## 2025-01-28 RX ORDER — ACETAMINOPHEN 325 MG/1
650 TABLET ORAL EVERY 4 HOURS
Status: DISCONTINUED | OUTPATIENT
Start: 2025-01-28 | End: 2025-02-05 | Stop reason: HOSPADM

## 2025-01-28 RX ORDER — CHLORHEXIDINE GLUCONATE ORAL RINSE 1.2 MG/ML
15 SOLUTION DENTAL 2 TIMES DAILY
Status: DISCONTINUED | OUTPATIENT
Start: 2025-01-28 | End: 2025-02-05 | Stop reason: HOSPADM

## 2025-01-28 RX ORDER — FAMOTIDINE 10 MG/ML
20 INJECTION, SOLUTION INTRAVENOUS
Status: DISCONTINUED | OUTPATIENT
Start: 2025-01-28 | End: 2025-02-05 | Stop reason: HOSPADM

## 2025-01-28 RX ORDER — EPHEDRINE SULFATE 50 MG/ML
INJECTION INTRAVENOUS
Status: DISCONTINUED | OUTPATIENT
Start: 2025-01-28 | End: 2025-01-28 | Stop reason: SDUPTHER

## 2025-01-28 RX ORDER — POTASSIUM CHLORIDE 7.45 MG/ML
10 INJECTION INTRAVENOUS PRN
Status: DISCONTINUED | OUTPATIENT
Start: 2025-01-28 | End: 2025-02-05 | Stop reason: HOSPADM

## 2025-01-28 RX ORDER — IPRATROPIUM BROMIDE AND ALBUTEROL SULFATE 2.5; .5 MG/3ML; MG/3ML
1 SOLUTION RESPIRATORY (INHALATION)
Status: ACTIVE | OUTPATIENT
Start: 2025-01-28 | End: 2025-01-30

## 2025-01-28 RX ORDER — FENTANYL CITRATE 50 UG/ML
25 INJECTION, SOLUTION INTRAMUSCULAR; INTRAVENOUS
Status: ACTIVE | OUTPATIENT
Start: 2025-01-28 | End: 2025-01-29

## 2025-01-28 RX ORDER — TRAMADOL HYDROCHLORIDE 50 MG/1
100 TABLET ORAL EVERY 6 HOURS PRN
Status: DISCONTINUED | OUTPATIENT
Start: 2025-01-28 | End: 2025-02-01

## 2025-01-28 RX ORDER — CLOPIDOGREL BISULFATE 75 MG/1
75 TABLET ORAL DAILY
Status: DISCONTINUED | OUTPATIENT
Start: 2025-01-29 | End: 2025-02-05

## 2025-01-28 RX ORDER — ROCURONIUM BROMIDE 10 MG/ML
INJECTION, SOLUTION INTRAVENOUS
Status: DISCONTINUED | OUTPATIENT
Start: 2025-01-28 | End: 2025-01-28 | Stop reason: SDUPTHER

## 2025-01-28 RX ORDER — DEXMEDETOMIDINE HYDROCHLORIDE 4 UG/ML
.1-1.5 INJECTION, SOLUTION INTRAVENOUS CONTINUOUS
Status: DISCONTINUED | OUTPATIENT
Start: 2025-01-28 | End: 2025-01-29

## 2025-01-28 RX ORDER — POLYETHYLENE GLYCOL 3350 17 G/17G
17 POWDER, FOR SOLUTION ORAL DAILY
Status: DISCONTINUED | OUTPATIENT
Start: 2025-01-28 | End: 2025-02-05 | Stop reason: HOSPADM

## 2025-01-28 RX ORDER — AMIODARONE HYDROCHLORIDE 200 MG/1
200 TABLET ORAL 2 TIMES DAILY
Status: DISCONTINUED | OUTPATIENT
Start: 2025-01-29 | End: 2025-01-31

## 2025-01-28 RX ORDER — SODIUM CHLORIDE 9 MG/ML
INJECTION, SOLUTION INTRAVENOUS PRN
Status: DISCONTINUED | OUTPATIENT
Start: 2025-01-28 | End: 2025-02-05 | Stop reason: HOSPADM

## 2025-01-28 RX ORDER — HYDRALAZINE HYDROCHLORIDE 20 MG/ML
10 INJECTION INTRAMUSCULAR; INTRAVENOUS EVERY 5 MIN PRN
Status: DISCONTINUED | OUTPATIENT
Start: 2025-01-28 | End: 2025-01-30

## 2025-01-28 RX ORDER — PROCHLORPERAZINE EDISYLATE 5 MG/ML
10 INJECTION INTRAMUSCULAR; INTRAVENOUS EVERY 6 HOURS PRN
Status: DISCONTINUED | OUTPATIENT
Start: 2025-01-28 | End: 2025-02-05 | Stop reason: HOSPADM

## 2025-01-28 RX ORDER — ONDANSETRON 2 MG/ML
INJECTION INTRAMUSCULAR; INTRAVENOUS
Status: DISCONTINUED | OUTPATIENT
Start: 2025-01-28 | End: 2025-01-28 | Stop reason: SDUPTHER

## 2025-01-28 RX ORDER — AMIODARONE HYDROCHLORIDE 150 MG/3ML
INJECTION, SOLUTION INTRAVENOUS
Status: DISCONTINUED | OUTPATIENT
Start: 2025-01-28 | End: 2025-01-28 | Stop reason: SDUPTHER

## 2025-01-28 RX ORDER — ONDANSETRON 2 MG/ML
4 INJECTION INTRAMUSCULAR; INTRAVENOUS EVERY 6 HOURS PRN
Status: DISCONTINUED | OUTPATIENT
Start: 2025-01-28 | End: 2025-01-28

## 2025-01-28 RX ORDER — LIDOCAINE HYDROCHLORIDE 20 MG/ML
INJECTION, SOLUTION INTRAVENOUS
Status: DISCONTINUED | OUTPATIENT
Start: 2025-01-28 | End: 2025-01-28 | Stop reason: SDUPTHER

## 2025-01-28 RX ORDER — PROPOFOL 10 MG/ML
5-50 INJECTION, EMULSION INTRAVENOUS CONTINUOUS
Status: DISCONTINUED | OUTPATIENT
Start: 2025-01-28 | End: 2025-01-29

## 2025-01-28 RX ADMIN — PROCHLORPERAZINE EDISYLATE 10 MG: 5 INJECTION INTRAMUSCULAR; INTRAVENOUS at 20:15

## 2025-01-28 RX ADMIN — SODIUM CHLORIDE: 900 INJECTION INTRAVENOUS at 15:14

## 2025-01-28 RX ADMIN — POTASSIUM CHLORIDE 20 MEQ: 29.8 INJECTION, SOLUTION INTRAVENOUS at 16:50

## 2025-01-28 RX ADMIN — PHENYLEPHRINE HYDROCHLORIDE 50 MCG: 10 INJECTION INTRAVENOUS at 09:24

## 2025-01-28 RX ADMIN — CHLORHEXIDINE GLUCONATE 0.12% ORAL RINSE 15 ML: 1.2 LIQUID ORAL at 21:46

## 2025-01-28 RX ADMIN — HEPARIN SODIUM 1000 UNITS: 1000 INJECTION INTRAVENOUS; SUBCUTANEOUS at 09:32

## 2025-01-28 RX ADMIN — DEXTROSE AND SODIUM CHLORIDE: 5; 450 INJECTION, SOLUTION INTRAVENOUS at 22:55

## 2025-01-28 RX ADMIN — MIDAZOLAM 1 MG: 1 INJECTION INTRAMUSCULAR; INTRAVENOUS at 13:29

## 2025-01-28 RX ADMIN — FENTANYL CITRATE 100 MCG: 50 INJECTION, SOLUTION INTRAMUSCULAR; INTRAVENOUS at 08:52

## 2025-01-28 RX ADMIN — DEXMEDETOMIDINE HYDROCHLORIDE 0.25 MCG/KG/HR: 4 INJECTION, SOLUTION INTRAVENOUS at 09:09

## 2025-01-28 RX ADMIN — SODIUM BICARBONATE 50 MEQ: 84 INJECTION, SOLUTION INTRAVENOUS at 16:08

## 2025-01-28 RX ADMIN — SODIUM CHLORIDE, PRESERVATIVE FREE 10 ML: 5 INJECTION INTRAVENOUS at 20:45

## 2025-01-28 RX ADMIN — SODIUM CHLORIDE 2.5 MG/HR: 9 INJECTION, SOLUTION INTRAVENOUS at 14:48

## 2025-01-28 RX ADMIN — FENTANYL CITRATE 50 MCG: 50 INJECTION, SOLUTION INTRAMUSCULAR; INTRAVENOUS at 13:29

## 2025-01-28 RX ADMIN — LIDOCAINE HYDROCHLORIDE 80 MG: 20 INJECTION, SOLUTION INTRAVENOUS at 07:57

## 2025-01-28 RX ADMIN — NOREPINEPHRINE BITARTRATE 2 MCG/MIN: 64 SOLUTION INTRAVENOUS at 14:48

## 2025-01-28 RX ADMIN — ROCURONIUM BROMIDE 20 MG: 10 INJECTION, SOLUTION INTRAVENOUS at 09:00

## 2025-01-28 RX ADMIN — EPINEPHRINE 10 MCG: 1 INJECTION PARENTERAL at 08:28

## 2025-01-28 RX ADMIN — SODIUM BICARBONATE 50 MEQ: 84 INJECTION, SOLUTION INTRAVENOUS at 19:12

## 2025-01-28 RX ADMIN — FENTANYL CITRATE 100 MCG: 50 INJECTION, SOLUTION INTRAMUSCULAR; INTRAVENOUS at 13:12

## 2025-01-28 RX ADMIN — ACETAMINOPHEN 650 MG: 325 TABLET ORAL at 20:28

## 2025-01-28 RX ADMIN — WATER 2000 MG: 1 INJECTION INTRAMUSCULAR; INTRAVENOUS; SUBCUTANEOUS at 12:30

## 2025-01-28 RX ADMIN — AMINOCAPROIC ACID 5 G/HR: 250 INJECTION, SOLUTION INTRAVENOUS at 08:43

## 2025-01-28 RX ADMIN — LEVOTHYROXINE SODIUM 150 MCG: 0.07 TABLET ORAL at 06:06

## 2025-01-28 RX ADMIN — DEXAMETHASONE SODIUM PHOSPHATE 8 MG: 4 INJECTION, SOLUTION INTRAMUSCULAR; INTRAVENOUS at 08:13

## 2025-01-28 RX ADMIN — SODIUM CHLORIDE: 900 INJECTION INTRAVENOUS at 07:44

## 2025-01-28 RX ADMIN — PROPOFOL 50 MG: 10 INJECTION, EMULSION INTRAVENOUS at 07:58

## 2025-01-28 RX ADMIN — POTASSIUM CHLORIDE 20 MEQ: 29.8 INJECTION, SOLUTION INTRAVENOUS at 18:42

## 2025-01-28 RX ADMIN — EPHEDRINE SULFATE 10 MG: 50 INJECTION INTRAVENOUS at 08:06

## 2025-01-28 RX ADMIN — PROTAMINE SULFATE 300 MG: 10 INJECTION, SOLUTION INTRAVENOUS at 14:16

## 2025-01-28 RX ADMIN — FENTANYL CITRATE 25 MCG: 50 INJECTION, SOLUTION INTRAMUSCULAR; INTRAVENOUS at 18:40

## 2025-01-28 RX ADMIN — SUGAMMADEX 200 MG: 100 INJECTION, SOLUTION INTRAVENOUS at 15:43

## 2025-01-28 RX ADMIN — SODIUM BICARBONATE 50 MEQ: 84 INJECTION, SOLUTION INTRAVENOUS at 15:05

## 2025-01-28 RX ADMIN — IPRATROPIUM BROMIDE AND ALBUTEROL SULFATE 1 DOSE: 2.5; .5 SOLUTION RESPIRATORY (INHALATION) at 16:30

## 2025-01-28 RX ADMIN — POTASSIUM CHLORIDE 20 MEQ: 29.8 INJECTION, SOLUTION INTRAVENOUS at 22:57

## 2025-01-28 RX ADMIN — METOPROLOL TARTRATE 25 MG: 25 TABLET, FILM COATED ORAL at 06:06

## 2025-01-28 RX ADMIN — VANCOMYCIN HYDROCHLORIDE 1000 MG: 1 INJECTION, POWDER, LYOPHILIZED, FOR SOLUTION INTRAVENOUS at 08:24

## 2025-01-28 RX ADMIN — MUPIROCIN: 20 OINTMENT TOPICAL at 20:46

## 2025-01-28 RX ADMIN — ALBUMIN (HUMAN) 250 ML: 12.5 INJECTION, SOLUTION INTRAVENOUS at 14:38

## 2025-01-28 RX ADMIN — FAMOTIDINE 20 MG: 10 INJECTION, SOLUTION INTRAVENOUS at 20:46

## 2025-01-28 RX ADMIN — MIDAZOLAM 2 MG: 1 INJECTION INTRAMUSCULAR; INTRAVENOUS at 07:41

## 2025-01-28 RX ADMIN — ALPRAZOLAM 0.25 MG: 0.25 TABLET ORAL at 20:28

## 2025-01-28 RX ADMIN — PHENYLEPHRINE HYDROCHLORIDE 50 MCG: 10 INJECTION INTRAVENOUS at 09:34

## 2025-01-28 RX ADMIN — SODIUM CHLORIDE 2 UNITS/HR: 9 INJECTION, SOLUTION INTRAVENOUS at 12:09

## 2025-01-28 RX ADMIN — SODIUM BICARBONATE 50 MEQ: 84 INJECTION, SOLUTION INTRAVENOUS at 19:03

## 2025-01-28 RX ADMIN — GABAPENTIN 300 MG: 300 CAPSULE ORAL at 21:46

## 2025-01-28 RX ADMIN — AMIODARONE HYDROCHLORIDE 150 MG: 50 INJECTION, SOLUTION INTRAVENOUS at 13:56

## 2025-01-28 RX ADMIN — FENTANYL CITRATE 50 MCG: 50 INJECTION, SOLUTION INTRAMUSCULAR; INTRAVENOUS at 09:50

## 2025-01-28 RX ADMIN — IPRATROPIUM BROMIDE AND ALBUTEROL SULFATE 1 DOSE: 2.5; .5 SOLUTION RESPIRATORY (INHALATION) at 20:39

## 2025-01-28 RX ADMIN — SODIUM BICARBONATE 50 MEQ: 84 INJECTION, SOLUTION INTRAVENOUS at 21:15

## 2025-01-28 RX ADMIN — SODIUM CHLORIDE: 9 INJECTION, SOLUTION INTRAVENOUS at 16:48

## 2025-01-28 RX ADMIN — FENTANYL CITRATE 100 MCG: 50 INJECTION, SOLUTION INTRAMUSCULAR; INTRAVENOUS at 15:17

## 2025-01-28 RX ADMIN — WATER 2000 MG: 1 INJECTION INTRAMUSCULAR; INTRAVENOUS; SUBCUTANEOUS at 20:17

## 2025-01-28 RX ADMIN — FENTANYL CITRATE 150 MCG: 50 INJECTION, SOLUTION INTRAMUSCULAR; INTRAVENOUS at 07:57

## 2025-01-28 RX ADMIN — HEPARIN SODIUM 25000 UNITS: 1000 INJECTION INTRAVENOUS; SUBCUTANEOUS at 12:01

## 2025-01-28 RX ADMIN — ONDANSETRON 4 MG: 2 INJECTION INTRAMUSCULAR; INTRAVENOUS at 14:41

## 2025-01-28 RX ADMIN — WATER 2000 MG: 1 INJECTION INTRAMUSCULAR; INTRAVENOUS; SUBCUTANEOUS at 08:35

## 2025-01-28 RX ADMIN — MIDAZOLAM 1 MG: 1 INJECTION INTRAMUSCULAR; INTRAVENOUS at 11:14

## 2025-01-28 RX ADMIN — CALCIUM CHLORIDE 0.5 G: 100 INJECTION, SOLUTION INTRAVENOUS; INTRAVENTRICULAR at 08:06

## 2025-01-28 RX ADMIN — PHENYLEPHRINE HYDROCHLORIDE 50 MCG: 10 INJECTION INTRAVENOUS at 09:29

## 2025-01-28 RX ADMIN — FENTANYL CITRATE 200 MCG: 50 INJECTION, SOLUTION INTRAMUSCULAR; INTRAVENOUS at 08:45

## 2025-01-28 RX ADMIN — ROCURONIUM BROMIDE 80 MG: 10 INJECTION, SOLUTION INTRAVENOUS at 07:59

## 2025-01-28 RX ADMIN — POTASSIUM CHLORIDE 20 MEQ: 29.8 INJECTION, SOLUTION INTRAVENOUS at 21:53

## 2025-01-28 RX ADMIN — ROCURONIUM BROMIDE 50 MG: 10 INJECTION, SOLUTION INTRAVENOUS at 10:24

## 2025-01-28 RX ADMIN — SENNOSIDES AND DOCUSATE SODIUM 1 TABLET: 50; 8.6 TABLET ORAL at 21:45

## 2025-01-28 RX ADMIN — PHENYLEPHRINE HYDROCHLORIDE 50 MCG: 10 INJECTION INTRAVENOUS at 09:04

## 2025-01-28 ASSESSMENT — PAIN DESCRIPTION - DESCRIPTORS
DESCRIPTORS: ACHING;SORE

## 2025-01-28 ASSESSMENT — PAIN DESCRIPTION - LOCATION
LOCATION: CHEST

## 2025-01-28 ASSESSMENT — PAIN DESCRIPTION - PAIN TYPE
TYPE: ACUTE PAIN;SURGICAL PAIN
TYPE: ACUTE PAIN;SURGICAL PAIN

## 2025-01-28 ASSESSMENT — PAIN DESCRIPTION - ORIENTATION
ORIENTATION: MID;LOWER
ORIENTATION: INNER
ORIENTATION: MID;LOWER

## 2025-01-28 ASSESSMENT — PULMONARY FUNCTION TESTS: PIF_VALUE: 23

## 2025-01-28 ASSESSMENT — PAIN DESCRIPTION - ONSET
ONSET: ON-GOING
ONSET: ON-GOING

## 2025-01-28 ASSESSMENT — PAIN DESCRIPTION - FREQUENCY
FREQUENCY: CONTINUOUS
FREQUENCY: CONTINUOUS

## 2025-01-28 ASSESSMENT — PAIN SCALES - GENERAL
PAINLEVEL_OUTOF10: 5
PAINLEVEL_OUTOF10: 8
PAINLEVEL_OUTOF10: 8

## 2025-01-28 ASSESSMENT — PAIN - FUNCTIONAL ASSESSMENT
PAIN_FUNCTIONAL_ASSESSMENT: ACTIVITIES ARE NOT PREVENTED

## 2025-01-28 NOTE — PROGRESS NOTES
4 Eyes Skin Assessment     NAME:  Rachel Mcdaniel  YOB: 1950  MEDICAL RECORD NUMBER:  [id number]    The patient is being assessed for  Post-Op Surgical    I agree that at least one RN has performed a thorough Head to Toe Skin Assessment on the patient. ALL assessment sites listed below have been assessed.      Areas assessed by both nurses:    Head, Face, Ears, Shoulders, Back, Chest, Arms, Elbows, Hands, Sacrum. Buttock, Coccyx, Ischium, Legs. Feet and Heels, and Under Medical Devices         Does the Patient have a Wound? No noted wound(s)       Mohsen Prevention initiated by RN: No  Wound Care Orders initiated by RN: No    Pressure Injury (Stage 3,4, Unstageable, DTI, NWPT, and Complex wounds) if present, place Wound referral order by RN under : No    New Ostomies, if present place, Ostomy referral order under : No     Nurse 1 eSignature: Electronically signed by Aby Antonio RN on 1/28/25 at 6:37 PM EST    **SHARE this note so that the co-signing nurse can place an eSignature**    Nurse 2 eSignature: Electronically signed by Mary Koch RN on 1/28/25 at 7:53 PM EST

## 2025-01-28 NOTE — PROGRESS NOTES
Called to address radiology report of PA cath coiled in pulmonary artery.  CXR reviewed- ?/o distal 4cm of catheter looped back on self  Called Dr Tabares (he placed catheter in OR with some challenge) for assistance with bedside   Multiple repositioning attemps.   Personally reviewed raw radiology at bedside with Dr Tabares and ALBERTO Richard.  Locked at 40cm, satisfactory wave forms.  Imaging suggests terminus at PA    Initiial cxr      After repositioning           Patient critical care due to:  [x] For rapid decompensation,  [] Electrolyte instability  [] Suctioning every 2 hours  [] Every hour neuro checks  [] Every hour neurovascular checks  [x] Frequent evaluation of patient's response to treatment and titration of therapies,  [x] Interpretation of laboratory and radiological data,  [x] Application and interpretation of advanced monitoring technologies,  [x] Extensive interpretation of multiple databases,  [x] Development of treatment plan with patient, surrogate, or consultants.       I personally spent additional 35 minutes critical care time in the care of this patient including but not limited to reviewing history, examining the patient, reviewing imaging and laboratory findings, personally discussing care with the other members of the ICU team and consultants, and coordinating the patient's care.

## 2025-01-28 NOTE — ANESTHESIA PROCEDURE NOTES
Procedure Performed: BELLE       Start Time:  1/28/2025 8:25 AM       End Time:      Anesthesia Information  Performed Personally  Anesthesiologist:  Flynn Tabares MD      Echocardiogram Comments:       PRE CARDIOPULMONARY BYPASS:     LEFT VENTRICLE  -The left ventricle chamber size is within normal limits.   -There is preserved global contractility without significant regional wall motion abnormalities. Ejection fraction of ~63% per Alvarenga's Method.   -There is evidence of probable concentric hypertrophy/concentric remodeling.  RIGHT VENTRICLE  -Right ventricle appears normal in size.   -Right ventricular free wall longitudinal motion is normal (TAPSE ~19 mm).  -Wall thickness is grossly normal.  INTERVENTRICULAR SEPTUM  -No evidence of basal hypertrophy.   -No obvious shunt by color flow doppler.  LEFT ATRIUM  -Left atrium size is probably normal in limited views obtained.   RIGHT ATRIUM  -Right atrium is probably normal in size.  -Central venous line guidewire was visualized in the RA during catheter advancement  -[place] Sherine Catheter can be seen traversing the right atrium through the tricuspid and pulmonic valves  LEFT ATRIAL APPENDAGE  -There is no evidence of thrombus or spontaneous echo contrast in the left atrial appendage.  -PWD outflow velocities of ~30-35 cm/s  AORTIC VALVE  -The aortic valve annulus size is normal. There is annular calcification.   -The aortic valve is tri-leaflet. There is evidence of aortic sclerosis without stenosis. CALLUM measured by 3D planimetry is 2.06 cm^2 (AVAi/BSA >0.85). Mean and peak gradients are ~9 & 14 mmHg respectively.   -There is mild central insufficiency jet appreciated.   MITRAL VALVE  -The mitral valve annulus appears grossly normal.  -The anterior and posterior mitral valve leaflets appear thickened with preserved diastolic excursion. There is no evidence of mitral stenosis-mean/peak gradient or 1 & 3 mmHg.  -There is multiple mild insufficiency jets that are

## 2025-01-28 NOTE — OP NOTE
Operative Note      Patient: Rachel Mcdaniel  YOB: 1950  MRN: [id number]    Date of Procedure: 1/28/2025    Pre-Op Diagnosis Codes:      * Coronary atherosclerosis of native coronary artery [I25.10]     * Aortic regurgitation [I35.1]    Post-Op Diagnosis: Same    Persistent left SVC  Previous use of the LIMA for a TRAM flap reconstruction after a left mastectomy    Procedure(s):  CORONARY ARTERY BYPASS GRAFT X; LEFT RADIAL ARTERY-LAD; SVG-DIAG; SVG-OM1; LEFT ENDOSCOPIC GREATER SAPHENOUS VEIN HARVEST; LEFT ENDOSCOPIC RADIAL ARETY HARVEST; LEFT ATRIAL APPENDAGE LIGATION WITH A 45MM ATRICLIP; BELLE;    Surgeon(s):  Tunde Byrd MD    Assistant:   Surgical Assistant: Belia Hutchins  First Assistant: GERSON; Arnoldo Moore PA.  Arnoldo Moore performed the video-assisted endoscopic vein harvest.  Our first assistant performed all necessary activities including exposure, first assistant role and assisting with the closure throughout the entire procedure.     Radial artery harvest: Nato Pina. GERSON    Anesthesia: General    Estimated Blood Loss (mL): 300     Complications: Other: The patient had a previous left-sided TRAM flap and radiation therapy to her chest.  Unbeknownst to us instead of using the superior epigastric artery as a pedicle, the previous surgeon in 2007 had sewn the TRAM flap as a free flap and deflected into the mammary artery and vein on the left side.  When I performed my mammary artery harvest there was a very small atrophic distal mammary artery and then I came to that anastomosis area where the artery to the pedicle was damage.  The mammary artery was very nice and had a good pulse and was large from then on but it was way too short to be used for a either diagonal or left anterior descending graft.  I then explored right internal mammary artery and there were also dense adhesions from the previous radiation and given the fact that I decided not to use that artery, as  cold cardioplegia was given and a vent was immediately placed after we clamped the aorta and started cardioplegia.  Great care was taken that we did not any overdistention of the LV which we did not.  Now the distal right coronary territory was inspected but there was no bypassable distal target and all the branches visualized on the coronary angiogram only had 30 to 40% stenosis and did not need a bypass.  The heart was then reflected a longitudinal arteriotomy was performed in the OM and a end-to-side anastomosis was fashioned from the greater saphenous vein to the OM with a running Prolene suture.  Then the base of the left atrial appendage was measured and a 45 mm atrial clip was placed.  Then a longitudinal arteriotomy was performed in the diagonal branch which was fairly diseased and a end-to-side anastomosis was fashioned from the greater saphenous vein to the radial artery with a running Prolene suture.  I measured again prior to doing this if the left internal mammary artery would reach but it would not.  Now the LAD was dissected free and distally it had significant soft plaque and I found more proximally a soft area where a longitudinal arteriotomy was performed.  A end-to-side anastomosis was then fashioned from the left radial artery to the LAD with a running Prolene suture.  Once this was complete the 3 proximal anastomoses were fashioned using single clamp technique.  A small punch was used for the radial artery.  Once the proximal anastomoses were accomplished we placed a pacemaker on the heart and gave a antegrade hotshot while continuously venting the LV.  I made sure that we did not have to high root pressure and the LV would not distend and once the heart started beating we removed the crossclamped while continuously venting the aortic root.  While we reperfused the heart we placed a left posterior pleural chest tube a right posterior pleural chest tube a retrocardiac Bari drain and a

## 2025-01-28 NOTE — ANESTHESIA PROCEDURE NOTES
Central Venous Line:    A central venous line was placed using ultrasound guidance, in the OR for the following indication(s): central venous access and CVP monitoring.1/28/2025 8:10 AM1/28/2025 8:25 AM    Sterility preparation included the following: provider used sterile gloves, gown, hat and mask, hand hygiene performed prior to central venous catheter insertion, sterile gel and probe cover used in ultrasound-guided central venous catheter insertion and maximum sterile barriers used during central venous catheter insertion.    The patient was placed in Trendelenburg position.The right internal jugular vein was prepped.    The site was prepped with Chloraprep.  A 9 Fr (size), introducer double lumen was placed.    During the procedure, the following specific steps were taken: target vein identified, needle advanced into vein and blood aspirated and guidewire advanced into vein.    Intravenous verification was obtained by ultrasound, x-ray, BELLE, BELLE and manometry.    Post insertion care included: all ports aspirated, all ports flushed easily, guidewire removed intact, Biopatch applied and line sutured in place.    During the procedure the patient experienced: patient tolerated procedure well with no complications and EBL < 5mL.      Insertion site scrubbed per usage guidelines?: Yes  Skin prep agent dried for 3 minutes prior to procedure?:yes  Outcomes: uncomplicated and patient tolerated procedure well  Real-time US image taken/store: Yes  Anesthesia type: generalA(n) oximetric,right internal jugular vein.  The PAC placement was confirmed by pressure tracing changes, BELLE and x-ray and secured at 45 cm (depth).  The patient experienced the following events during the procedure: patient tolerated procedure well with no complications.PA Cath placed?: Yes  Staffing  Performed: Anesthesiologist   Anesthesiologist: Flynn Tabares MD  Performed by: Flynn Tabares MD  Authorized by: Flynn Tabares MD

## 2025-01-28 NOTE — ANESTHESIA PROCEDURE NOTES
Arterial Line:    An arterial line was placed using ultrasound guidance, in the pre-op for the following indication(s): .

## 2025-01-28 NOTE — CONSULTS
Consult Note 25        NAME: Rachel Mcdaniel  : 1950  MRN: [id number]      Assessment/Plan:  Rachel Mcdaniel is a 74 y.o. 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 hypertension and hyperlipidemia who presented to [facility] 2025 for left heart cath  POD#0 following CABGx3,   critical care consultation re: mechanical ventilation, insulin infusion, anticipated extubation within 6hrs  Insulin infusion transition to sliding scale overage after 24hr    Problem list  POD#0 CORONARY ARTERY BYPASS GRAFT X; LEFT RADIAL ARTERY-LAD; SVG-DIAG; SVG-OM1; LEFT ENDOSCOPIC GREATER SAPHENOUS VEIN HARVEST; LEFT ENDOSCOPIC RADIAL ARTERY HARVEST; LEFT ATRIAL APPENDAGE LIGATION WITH A 45MM ATRICLIP; BELLE;   Mechanical ventilation  Insulin infusion  PA catheter placed in OR      Neuro: sedated- precedex; weaning to planned SBT/SAT extubation  Cardio:  goal; ScVo2 >70, CO>4, CI>2; cardene infusion   Resp: currently supported with AC/VC mechanical vent, appropriate for SAT; clear anterior, decreased left greater than right base  GI: OGT- medications as needed  RENAL: elyte corection prn; bundy cath for strict I/O  Heme:  repeat labs pending; minimal drainage from all chest tubes- stripping for avoidance of clot/missed volume  ID: preop abx admin per surgical plan  Endo: insulin infusion for 24, then transition to sliding scale for glycemic control  MSK: left femoral arterial line,left endoscopic radial artery harvest    Tubes/Lines/Drains:  pacer wires/paced; CXTx4, right radial arterial line, femoral arterial line, RIJ PA cath TLC, PIV, pacer wires,   EBL:300  Code Status: FULL    Thank you for allowing us to participate in the care of your patient. For any questions, please call.       Chief Complaint / Reason for Consult:  Mechanical ventilation  Insulin infusion  CABGx3 POD#0    History of Present Illness:  BELLE was reviwed: mild AI and

## 2025-01-28 NOTE — PROGRESS NOTES
Patient following commands and able to lift head off pillow.  Breathing spontaneously on minimal ventilator settings.  NIF -28  RSBI 95   Patient meets all criteria for extubation at this time.   Ok to extubate per Dr. Alena GOLD  Extubated at 1820 by Elisha CRAMER.   Placed on 10 L nasal cannula.  Pt tolerated well.

## 2025-01-28 NOTE — ANESTHESIA POSTPROCEDURE EVALUATION
Department of Anesthesiology  Postprocedure Note    Patient: Rachel Mcdaniel  MRN: [id number]  YOB: 1950  Date of evaluation: 1/28/2025    Procedure Summary       Date: 01/28/25 Room / Location: [address]    Anesthesia Start: 0743 Anesthesia Stop: 1547    Procedure: CORONARY ARTERY BYPASS GRAFT X3; LEFT RADIAL ARTERY-LAD; SVG-DIAG; SVG-OM1; LEFT ENDOSCOPIC GREATER SAPHENOUS VEIN HARVEST; LEFT ENDOSCOPIC RADIAL ARETY HARVEST; LEFT ATRIAL APPENDAGE LIGATION WITH A 45MM ATRICLIP; INSERTION OF LEFT FEMORAL ARTERIAL LINE; BELLE; (Chest) Diagnosis:       Coronary atherosclerosis of native coronary artery      Aortic regurgitation      (Coronary atherosclerosis of native coronary artery [I25.10])      (Aortic regurgitation [I35.1])    Surgeons: Tunde Byrd MD Responsible Provider: Flynn Tabares MD    Anesthesia Type: general ASA Status: 4            Anesthesia Type: No value filed.    Vadim Phase I:      Vadim Phase II:      Anesthesia Post Evaluation    Patient location during evaluation: ICU  Patient participation: complete - patient cannot participate  Level of consciousness: sedated and ventilated  Pain scale: sedated BERT.  Airway patency: patent (ETT)  Nausea: sedated BERT.  Cardiovascular status: hemodynamically stable and vasoactive/inotropes  Respiratory status: acceptable, ventilator and intubated  Hydration status: euvolemic  Pain management: adequate    No notable events documented.

## 2025-01-28 NOTE — PROGRESS NOTES
Orders given to extubate patient. Patient extubated to 11L HFNC, SP02 >92%. Will wean FI02 as tolerated.

## 2025-01-29 ENCOUNTER — APPOINTMENT (OUTPATIENT)
Dept: CT IMAGING | Age: 75
DRG: 236 | End: 2025-01-29
Attending: THORACIC SURGERY (CARDIOTHORACIC VASCULAR SURGERY)
Payer: MEDICARE

## 2025-01-29 ENCOUNTER — APPOINTMENT (OUTPATIENT)
Dept: GENERAL RADIOLOGY | Age: 75
DRG: 236 | End: 2025-01-29
Attending: THORACIC SURGERY (CARDIOTHORACIC VASCULAR SURGERY)
Payer: MEDICARE

## 2025-01-29 LAB
ALBUMIN SERPL-MCNC: 3.3 G/DL (ref 3.4–5)
ALBUMIN/GLOB SERPL: 2.2 {RATIO} (ref 1.1–2.2)
ALP SERPL-CCNC: 37 U/L (ref 40–129)
ALT SERPL-CCNC: 13 U/L (ref 10–40)
ANION GAP SERPL CALCULATED.3IONS-SCNC: 9 MMOL/L (ref 9–17)
ANION GAP SERPL CALCULATED.3IONS-SCNC: 9 MMOL/L (ref 9–17)
ARTERIAL PATENCY WRIST A: ABNORMAL
ARTERIAL PATENCY WRIST A: NORMAL
AST SERPL-CCNC: 48 U/L (ref 15–37)
BASOPHILS # BLD: 0.02 K/UL
BASOPHILS NFR BLD: 0 % (ref 0–1)
BILIRUB SERPL-MCNC: 0.3 MG/DL (ref 0–1)
BODY TEMPERATURE: 37
BUN BLD-MCNC: 17 MG/DL (ref 7–20)
BUN BLD-MCNC: 20 MG/DL (ref 7–20)
BUN SERPL-MCNC: 20 MG/DL (ref 7–20)
BUN SERPL-MCNC: 23 MG/DL (ref 7–20)
CA-I BLD-SCNC: 1.16 MMOL/L (ref 1.15–1.33)
CA-I BLD-SCNC: 1.2 MMOL/L (ref 1.15–1.33)
CA-I BLD-SCNC: 1.29 MMOL/L (ref 1.15–1.33)
CALCIUM SERPL-MCNC: 7.8 MG/DL (ref 8.3–10.6)
CALCIUM SERPL-MCNC: 8 MG/DL (ref 8.3–10.6)
CHLORIDE BLD-SCNC: 117 MMOL/L (ref 99–110)
CHLORIDE BLD-SCNC: 117 MMOL/L (ref 99–110)
CHLORIDE SERPL-SCNC: 110 MMOL/L (ref 99–110)
CHLORIDE SERPL-SCNC: 117 MMOL/L (ref 99–110)
CO2 SERPL-SCNC: 23 MMOL/L (ref 21–32)
CO2 SERPL-SCNC: 23 MMOL/L (ref 21–32)
COHGB MFR BLD: 0.3 % (ref 0.5–1.5)
CREAT BLD-MCNC: 0.8 MG/DL (ref 0.5–1.2)
CREAT SERPL-MCNC: 0.7 MG/DL (ref 0.6–1.2)
CREAT SERPL-MCNC: 0.7 MG/DL (ref 0.6–1.2)
EGFR, POC: 78 ML/MIN/1.73M2
EGFR, POC: ABNORMAL ML/MIN/1.73M2
EOSINOPHIL # BLD: 0 K/UL
EOSINOPHILS RELATIVE PERCENT: 0 % (ref 0–3)
ERYTHROCYTE [DISTWIDTH] IN BLOOD BY AUTOMATED COUNT: 13.6 % (ref 11.7–14.9)
ERYTHROCYTE [DISTWIDTH] IN BLOOD BY AUTOMATED COUNT: 14.1 % (ref 11.7–14.9)
FIBRINOGEN PPP-MCNC: 221 MG/DL (ref 170–540)
GFR, ESTIMATED: 80 ML/MIN/1.73M2
GFR, ESTIMATED: 83 ML/MIN/1.73M2
GLUCOSE BLD-MCNC: 100 MG/DL (ref 74–99)
GLUCOSE BLD-MCNC: 100 MG/DL (ref 74–99)
GLUCOSE BLD-MCNC: 108 MG/DL (ref 74–99)
GLUCOSE BLD-MCNC: 109 MG/DL (ref 74–99)
GLUCOSE BLD-MCNC: 109 MG/DL (ref 74–99)
GLUCOSE BLD-MCNC: 110 MG/DL (ref 74–99)
GLUCOSE BLD-MCNC: 110 MG/DL (ref 74–99)
GLUCOSE BLD-MCNC: 111 MG/DL (ref 74–99)
GLUCOSE BLD-MCNC: 112 MG/DL (ref 74–99)
GLUCOSE BLD-MCNC: 118 MG/DL (ref 74–99)
GLUCOSE BLD-MCNC: 119 MG/DL (ref 74–99)
GLUCOSE BLD-MCNC: 120 MG/DL (ref 74–99)
GLUCOSE BLD-MCNC: 125 MG/DL (ref 74–99)
GLUCOSE BLD-MCNC: 125 MG/DL (ref 74–99)
GLUCOSE BLD-MCNC: 128 MG/DL (ref 74–99)
GLUCOSE BLD-MCNC: 135 MG/DL (ref 74–99)
GLUCOSE BLD-MCNC: 210 MG/DL (ref 74–99)
GLUCOSE BLD-MCNC: 78 MG/DL (ref 74–99)
GLUCOSE BLD-MCNC: 95 MG/DL (ref 74–99)
GLUCOSE SERPL-MCNC: 117 MG/DL (ref 74–99)
GLUCOSE SERPL-MCNC: 119 MG/DL (ref 74–99)
HCO3 VENOUS: 23.4 MMOL/L (ref 22–29)
HCT VFR BLD AUTO: 25 % (ref 38–51)
HCT VFR BLD AUTO: 25 % (ref 38–51)
HCT VFR BLD AUTO: 25.8 % (ref 37–47)
HCT VFR BLD AUTO: 27.7 % (ref 37–47)
HCT VFR BLD AUTO: 36 % (ref 38–51)
HGB BLD-MCNC: 8.1 G/DL (ref 12.5–16)
HGB BLD-MCNC: 8.8 G/DL (ref 12.5–16)
IMM GRANULOCYTES # BLD AUTO: 0.09 K/UL
IMM GRANULOCYTES NFR BLD: 1 %
INR PPP: 1.3
LYMPHOCYTES NFR BLD: 1.89 K/UL
LYMPHOCYTES RELATIVE PERCENT: 11 % (ref 24–44)
MAGNESIUM SERPL-MCNC: 2.2 MG/DL (ref 1.8–2.4)
MAGNESIUM SERPL-MCNC: 2.2 MG/DL (ref 1.8–2.4)
MCH RBC QN AUTO: 29.8 PG (ref 27–31)
MCH RBC QN AUTO: 30.2 PG (ref 27–31)
MCHC RBC AUTO-ENTMCNC: 31.4 G/DL (ref 32–36)
MCHC RBC AUTO-ENTMCNC: 31.8 G/DL (ref 32–36)
MCV RBC AUTO: 93.9 FL (ref 78–100)
MCV RBC AUTO: 96.3 FL (ref 78–100)
METHEMOGLOBIN: 0.6 % (ref 0.5–1.5)
MONOCYTES NFR BLD: 1.24 K/UL
MONOCYTES NFR BLD: 7 % (ref 0–4)
NEGATIVE BASE EXCESS, ART: 2.4 MMOL/L (ref 0–3)
NEGATIVE BASE EXCESS, ART: 6.8 MMOL/L (ref 0–3)
NEGATIVE BASE EXCESS, VEN: 3.9 MMOL/L (ref 0–3)
NEUTROPHILS NFR BLD: 82 % (ref 36–66)
NEUTS SEG NFR BLD: 14.56 K/UL
OXYHGB MFR BLD: 58.7 %
OXYHGB MFR BLD: 72.8 %
PARTIAL THROMBOPLASTIN TIME: 30 SEC (ref 25.1–37.1)
PCO2 VENOUS: 53.1 MM HG (ref 38–54)
PH VENOUS: 7.26 (ref 7.32–7.43)
PHOSPHATE SERPL-MCNC: 3.5 MG/DL (ref 2.5–4.9)
PHOSPHATE SERPL-MCNC: 3.9 MG/DL (ref 2.5–4.9)
PLATELET # BLD AUTO: 143 K/UL (ref 140–440)
PLATELET, FLUORESCENCE: 136 K/UL (ref 140–440)
PMV BLD AUTO: 10.5 FL (ref 7.5–11.1)
PMV BLD AUTO: 10.8 FL (ref 7.5–11.1)
PO2 VENOUS: 41.5 MM HG (ref 23–48)
POC ANION GAP: 8 MMOL/L (ref 7–16)
POC ANION GAP: 8 MMOL/L (ref 7–16)
POC HCO3: 19.4 MMOL/L (ref 21–28)
POC HCO3: 23.9 MMOL/L (ref 21–28)
POC HCO3: 26 MMOL/L (ref 21–28)
POC HEMOGLOBIN (CALC): 12.2 G/DL (ref 12–17)
POC HEMOGLOBIN (CALC): 8.6 G/DL (ref 12–17)
POC HEMOGLOBIN (CALC): 8.6 G/DL (ref 12–17)
POC O2 SATURATION: 90.1 % (ref 94–98)
POC O2 SATURATION: 93.1 % (ref 94–98)
POC PCO2: 40.7 MM HG (ref 35–48)
POC PCO2: 43.2 MM HG (ref 35–48)
POC PCO2: 47.4 MM HG (ref 35–48)
POC PH: 7.29 (ref 7.35–7.45)
POC PH: 7.31 (ref 7.35–7.45)
POC PH: 7.39 (ref 7.35–7.45)
POC PO2: 60 MM HG (ref 83–108)
POC PO2: 74.2 MM HG (ref 83–108)
POSITIVE BASE EXCESS, ART: 0.8 MMOL/L (ref 0–3)
POTASSIUM BLD-SCNC: 4.4 MMOL/L (ref 3.5–5.1)
POTASSIUM BLD-SCNC: 4.4 MMOL/L (ref 3.5–5.1)
POTASSIUM SERPL-SCNC: 3.9 MMOL/L (ref 3.5–5.1)
POTASSIUM SERPL-SCNC: 4.6 MMOL/L (ref 3.5–5.1)
POTASSIUM SERPL-SCNC: 4.7 MMOL/L (ref 3.5–5.1)
PROT SERPL-MCNC: 4.7 G/DL (ref 6.4–8.2)
PROTHROMBIN TIME: 16.2 SEC (ref 11.7–14.5)
RBC # BLD AUTO: 2.68 M/UL (ref 4.2–5.4)
RBC # BLD AUTO: 2.95 M/UL (ref 4.2–5.4)
SAMPLE SITE: ABNORMAL
SAMPLE SITE: ABNORMAL
SODIUM BLD-SCNC: 144 MMOL/L (ref 136–145)
SODIUM BLD-SCNC: 151 MMOL/L (ref 136–145)
SODIUM SERPL-SCNC: 142 MMOL/L (ref 136–145)
SODIUM SERPL-SCNC: 150 MMOL/L (ref 136–145)
TCO2 (CALC), ART: 21 MMOL/L (ref 21–32)
TCO2 (CALC), ART: 25 MMOL/L (ref 21–32)
TCO2 (CALC), ART: 27 MMOL/L (ref 21–32)
TEXT FOR RESPIRATORY: NORMAL
TSH SERPL DL<=0.05 MIU/L-ACNC: 0.03 UIU/ML (ref 0.27–4.2)
WBC OTHER # BLD: 14.4 K/UL (ref 4–10.5)
WBC OTHER # BLD: 17.8 K/UL (ref 4–10.5)

## 2025-01-29 PROCEDURE — 94664 DEMO&/EVAL PT USE INHALER: CPT

## 2025-01-29 PROCEDURE — P9045 ALBUMIN (HUMAN), 5%, 250 ML: HCPCS | Performed by: PHYSICIAN ASSISTANT

## 2025-01-29 PROCEDURE — 6370000000 HC RX 637 (ALT 250 FOR IP): Performed by: PHYSICIAN ASSISTANT

## 2025-01-29 PROCEDURE — 36415 COLL VENOUS BLD VENIPUNCTURE: CPT

## 2025-01-29 PROCEDURE — 85610 PROTHROMBIN TIME: CPT

## 2025-01-29 PROCEDURE — 70498 CT ANGIOGRAPHY NECK: CPT

## 2025-01-29 PROCEDURE — 2580000003 HC RX 258: Performed by: THORACIC SURGERY (CARDIOTHORACIC VASCULAR SURGERY)

## 2025-01-29 PROCEDURE — 82803 BLOOD GASES ANY COMBINATION: CPT

## 2025-01-29 PROCEDURE — 99222 1ST HOSP IP/OBS MODERATE 55: CPT | Performed by: INTERNAL MEDICINE

## 2025-01-29 PROCEDURE — 70450 CT HEAD/BRAIN W/O DYE: CPT

## 2025-01-29 PROCEDURE — 97167 OT EVAL HIGH COMPLEX 60 MIN: CPT

## 2025-01-29 PROCEDURE — 6360000002 HC RX W HCPCS: Performed by: PHYSICIAN ASSISTANT

## 2025-01-29 PROCEDURE — 80048 BASIC METABOLIC PNL TOTAL CA: CPT

## 2025-01-29 PROCEDURE — 2100000000 HC CCU R&B

## 2025-01-29 PROCEDURE — 51702 INSERT TEMP BLADDER CATH: CPT

## 2025-01-29 PROCEDURE — 2580000003 HC RX 258: Performed by: PHYSICIAN ASSISTANT

## 2025-01-29 PROCEDURE — 2700000000 HC OXYGEN THERAPY PER DAY

## 2025-01-29 PROCEDURE — 85014 HEMATOCRIT: CPT

## 2025-01-29 PROCEDURE — 71045 X-RAY EXAM CHEST 1 VIEW: CPT

## 2025-01-29 PROCEDURE — 85025 COMPLETE CBC W/AUTO DIFF WBC: CPT

## 2025-01-29 PROCEDURE — 84439 ASSAY OF FREE THYROXINE: CPT

## 2025-01-29 PROCEDURE — 97163 PT EVAL HIGH COMPLEX 45 MIN: CPT

## 2025-01-29 PROCEDURE — 97530 THERAPEUTIC ACTIVITIES: CPT

## 2025-01-29 PROCEDURE — 82330 ASSAY OF CALCIUM: CPT

## 2025-01-29 PROCEDURE — 94660 CPAP INITIATION&MGMT: CPT

## 2025-01-29 PROCEDURE — 94640 AIRWAY INHALATION TREATMENT: CPT

## 2025-01-29 PROCEDURE — 84100 ASSAY OF PHOSPHORUS: CPT

## 2025-01-29 PROCEDURE — 6360000004 HC RX CONTRAST MEDICATION: Performed by: STUDENT IN AN ORGANIZED HEALTH CARE EDUCATION/TRAINING PROGRAM

## 2025-01-29 PROCEDURE — 2500000003 HC RX 250 WO HCPCS: Performed by: THORACIC SURGERY (CARDIOTHORACIC VASCULAR SURGERY)

## 2025-01-29 PROCEDURE — 85027 COMPLETE CBC AUTOMATED: CPT

## 2025-01-29 PROCEDURE — 82805 BLOOD GASES W/O2 SATURATION: CPT

## 2025-01-29 PROCEDURE — 80053 COMPREHEN METABOLIC PANEL: CPT

## 2025-01-29 PROCEDURE — 83735 ASSAY OF MAGNESIUM: CPT

## 2025-01-29 PROCEDURE — 6370000000 HC RX 637 (ALT 250 FOR IP): Performed by: INTERNAL MEDICINE

## 2025-01-29 PROCEDURE — 94669 MECHANICAL CHEST WALL OSCILL: CPT

## 2025-01-29 PROCEDURE — 84443 ASSAY THYROID STIM HORMONE: CPT

## 2025-01-29 PROCEDURE — 2500000003 HC RX 250 WO HCPCS: Performed by: PHYSICIAN ASSISTANT

## 2025-01-29 PROCEDURE — 82962 GLUCOSE BLOOD TEST: CPT

## 2025-01-29 PROCEDURE — 85730 THROMBOPLASTIN TIME PARTIAL: CPT

## 2025-01-29 PROCEDURE — 94761 N-INVAS EAR/PLS OXIMETRY MLT: CPT

## 2025-01-29 PROCEDURE — 84132 ASSAY OF SERUM POTASSIUM: CPT

## 2025-01-29 PROCEDURE — 85384 FIBRINOGEN ACTIVITY: CPT

## 2025-01-29 PROCEDURE — 80047 BASIC METABLC PNL IONIZED CA: CPT

## 2025-01-29 RX ORDER — AMLODIPINE BESYLATE 5 MG/1
2.5 TABLET ORAL DAILY
Status: DISCONTINUED | OUTPATIENT
Start: 2025-01-29 | End: 2025-01-31

## 2025-01-29 RX ORDER — LIDOCAINE HYDROCHLORIDE 10 MG/ML
50 INJECTION, SOLUTION EPIDURAL; INFILTRATION; INTRACAUDAL; PERINEURAL ONCE
Status: DISCONTINUED | OUTPATIENT
Start: 2025-01-29 | End: 2025-01-29

## 2025-01-29 RX ORDER — INSULIN GLARGINE 100 [IU]/ML
10 INJECTION, SOLUTION SUBCUTANEOUS ONCE
Status: COMPLETED | OUTPATIENT
Start: 2025-01-29 | End: 2025-01-29

## 2025-01-29 RX ORDER — LIDOCAINE HYDROCHLORIDE 20 MG/ML
100 INJECTION, SOLUTION INFILTRATION; PERINEURAL ONCE
Status: DISCONTINUED | OUTPATIENT
Start: 2025-01-29 | End: 2025-01-29

## 2025-01-29 RX ORDER — LEVOTHYROXINE SODIUM 75 UG/1
150 TABLET ORAL DAILY
Status: DISCONTINUED | OUTPATIENT
Start: 2025-01-29 | End: 2025-01-31

## 2025-01-29 RX ORDER — INSULIN LISPRO 100 [IU]/ML
0-8 INJECTION, SOLUTION INTRAVENOUS; SUBCUTANEOUS
Status: DISCONTINUED | OUTPATIENT
Start: 2025-01-29 | End: 2025-02-01

## 2025-01-29 RX ORDER — SODIUM CHLORIDE 0.9 % (FLUSH) 0.9 %
5-40 SYRINGE (ML) INJECTION PRN
Status: DISCONTINUED | OUTPATIENT
Start: 2025-01-29 | End: 2025-02-05 | Stop reason: HOSPADM

## 2025-01-29 RX ORDER — TRAMADOL HYDROCHLORIDE 50 MG/1
50 TABLET ORAL ONCE
Status: COMPLETED | OUTPATIENT
Start: 2025-01-29 | End: 2025-01-29

## 2025-01-29 RX ORDER — SODIUM CHLORIDE 0.9 % (FLUSH) 0.9 %
5-40 SYRINGE (ML) INJECTION EVERY 12 HOURS SCHEDULED
Status: DISCONTINUED | OUTPATIENT
Start: 2025-01-29 | End: 2025-02-05 | Stop reason: HOSPADM

## 2025-01-29 RX ORDER — IOPAMIDOL 755 MG/ML
75 INJECTION, SOLUTION INTRAVASCULAR
Status: COMPLETED | OUTPATIENT
Start: 2025-01-29 | End: 2025-01-29

## 2025-01-29 RX ORDER — LATANOPROST 50 UG/ML
1 SOLUTION/ DROPS OPHTHALMIC NIGHTLY
Status: DISCONTINUED | OUTPATIENT
Start: 2025-01-29 | End: 2025-02-05 | Stop reason: HOSPADM

## 2025-01-29 RX ORDER — ONDANSETRON 2 MG/ML
4 INJECTION INTRAMUSCULAR; INTRAVENOUS EVERY 6 HOURS PRN
Status: DISCONTINUED | OUTPATIENT
Start: 2025-01-29 | End: 2025-02-05 | Stop reason: HOSPADM

## 2025-01-29 RX ORDER — SODIUM CHLORIDE 9 MG/ML
INJECTION, SOLUTION INTRAVENOUS PRN
Status: DISCONTINUED | OUTPATIENT
Start: 2025-01-29 | End: 2025-02-05 | Stop reason: HOSPADM

## 2025-01-29 RX ADMIN — CHLORHEXIDINE GLUCONATE 0.12% ORAL RINSE 15 ML: 1.2 LIQUID ORAL at 20:52

## 2025-01-29 RX ADMIN — DEXTROSE AND SODIUM CHLORIDE: 5; 450 INJECTION, SOLUTION INTRAVENOUS at 16:03

## 2025-01-29 RX ADMIN — GABAPENTIN 300 MG: 300 CAPSULE ORAL at 08:30

## 2025-01-29 RX ADMIN — WATER 2000 MG: 1 INJECTION INTRAMUSCULAR; INTRAVENOUS; SUBCUTANEOUS at 04:07

## 2025-01-29 RX ADMIN — LEVOTHYROXINE SODIUM 150 MCG: 0.07 TABLET ORAL at 13:54

## 2025-01-29 RX ADMIN — ALBUMIN (HUMAN) 12.5 G: 12.5 INJECTION, SOLUTION INTRAVENOUS at 15:51

## 2025-01-29 RX ADMIN — ENOXAPARIN SODIUM 40 MG: 100 INJECTION SUBCUTANEOUS at 08:30

## 2025-01-29 RX ADMIN — ACETAMINOPHEN 650 MG: 325 TABLET ORAL at 12:06

## 2025-01-29 RX ADMIN — SODIUM CHLORIDE, PRESERVATIVE FREE 10 ML: 5 INJECTION INTRAVENOUS at 21:10

## 2025-01-29 RX ADMIN — AMIODARONE HYDROCHLORIDE 200 MG: 200 TABLET ORAL at 20:53

## 2025-01-29 RX ADMIN — IOPAMIDOL 75 ML: 755 INJECTION, SOLUTION INTRAVENOUS at 19:57

## 2025-01-29 RX ADMIN — AMLODIPINE BESYLATE 2.5 MG: 5 TABLET ORAL at 09:26

## 2025-01-29 RX ADMIN — INSULIN GLARGINE 10 UNITS: 100 INJECTION, SOLUTION SUBCUTANEOUS at 13:54

## 2025-01-29 RX ADMIN — SENNOSIDES AND DOCUSATE SODIUM 1 TABLET: 50; 8.6 TABLET ORAL at 21:18

## 2025-01-29 RX ADMIN — SODIUM CHLORIDE, PRESERVATIVE FREE 10 ML: 5 INJECTION INTRAVENOUS at 21:18

## 2025-01-29 RX ADMIN — MUPIROCIN: 20 OINTMENT TOPICAL at 21:09

## 2025-01-29 RX ADMIN — AMIODARONE HYDROCHLORIDE 200 MG: 200 TABLET ORAL at 08:06

## 2025-01-29 RX ADMIN — WATER 2000 MG: 1 INJECTION INTRAMUSCULAR; INTRAVENOUS; SUBCUTANEOUS at 20:52

## 2025-01-29 RX ADMIN — ACETAMINOPHEN 650 MG: 325 TABLET ORAL at 20:52

## 2025-01-29 RX ADMIN — ACETAMINOPHEN 650 MG: 325 TABLET ORAL at 08:04

## 2025-01-29 RX ADMIN — GUAIFENESIN 1200 MG: 600 TABLET ORAL at 20:53

## 2025-01-29 RX ADMIN — TRAMADOL HYDROCHLORIDE 100 MG: 50 TABLET, COATED ORAL at 12:04

## 2025-01-29 RX ADMIN — TRAMADOL HYDROCHLORIDE 50 MG: 50 TABLET, COATED ORAL at 05:44

## 2025-01-29 RX ADMIN — Medication 1 TABLET: at 08:06

## 2025-01-29 RX ADMIN — TRAMADOL HYDROCHLORIDE 50 MG: 50 TABLET, COATED ORAL at 09:19

## 2025-01-29 RX ADMIN — CHLORHEXIDINE GLUCONATE 0.12% ORAL RINSE 15 ML: 1.2 LIQUID ORAL at 08:08

## 2025-01-29 RX ADMIN — POLYETHYLENE GLYCOL (3350) 17 G: 17 POWDER, FOR SOLUTION ORAL at 08:07

## 2025-01-29 RX ADMIN — MUPIROCIN: 20 OINTMENT TOPICAL at 08:07

## 2025-01-29 RX ADMIN — ACETAMINOPHEN 650 MG: 325 TABLET ORAL at 15:48

## 2025-01-29 RX ADMIN — CLOPIDOGREL BISULFATE 75 MG: 75 TABLET ORAL at 08:06

## 2025-01-29 RX ADMIN — WATER 2000 MG: 1 INJECTION INTRAMUSCULAR; INTRAVENOUS; SUBCUTANEOUS at 12:09

## 2025-01-29 RX ADMIN — SENNOSIDES AND DOCUSATE SODIUM 1 TABLET: 50; 8.6 TABLET ORAL at 08:06

## 2025-01-29 RX ADMIN — ACETAMINOPHEN 650 MG: 325 TABLET ORAL at 00:05

## 2025-01-29 RX ADMIN — FAMOTIDINE 20 MG: 20 TABLET, FILM COATED ORAL at 20:52

## 2025-01-29 RX ADMIN — IPRATROPIUM BROMIDE AND ALBUTEROL SULFATE 1 DOSE: 2.5; .5 SOLUTION RESPIRATORY (INHALATION) at 16:35

## 2025-01-29 RX ADMIN — GUAIFENESIN 1200 MG: 600 TABLET ORAL at 08:06

## 2025-01-29 RX ADMIN — IPRATROPIUM BROMIDE AND ALBUTEROL SULFATE 1 DOSE: 2.5; .5 SOLUTION RESPIRATORY (INHALATION) at 12:13

## 2025-01-29 RX ADMIN — ASPIRIN 81 MG CHEWABLE TABLET 81 MG: 81 TABLET CHEWABLE at 08:06

## 2025-01-29 RX ADMIN — SODIUM CHLORIDE 2.5 MG/HR: 9 INJECTION, SOLUTION INTRAVENOUS at 00:26

## 2025-01-29 RX ADMIN — GABAPENTIN 300 MG: 300 CAPSULE ORAL at 13:54

## 2025-01-29 RX ADMIN — IPRATROPIUM BROMIDE AND ALBUTEROL SULFATE 1 DOSE: 2.5; .5 SOLUTION RESPIRATORY (INHALATION) at 08:30

## 2025-01-29 RX ADMIN — LATANOPROST 1 DROP: 50 SOLUTION OPHTHALMIC at 23:01

## 2025-01-29 RX ADMIN — POTASSIUM CHLORIDE 40 MEQ: 1500 TABLET, EXTENDED RELEASE ORAL at 21:53

## 2025-01-29 RX ADMIN — ACETAMINOPHEN 650 MG: 325 TABLET ORAL at 04:07

## 2025-01-29 RX ADMIN — PROCHLORPERAZINE EDISYLATE 10 MG: 5 INJECTION INTRAMUSCULAR; INTRAVENOUS at 13:45

## 2025-01-29 RX ADMIN — IPRATROPIUM BROMIDE AND ALBUTEROL SULFATE 1 DOSE: 2.5; .5 SOLUTION RESPIRATORY (INHALATION) at 21:36

## 2025-01-29 RX ADMIN — ATORVASTATIN CALCIUM 40 MG: 40 TABLET, FILM COATED ORAL at 20:52

## 2025-01-29 RX ADMIN — SODIUM CHLORIDE, PRESERVATIVE FREE 10 ML: 5 INJECTION INTRAVENOUS at 08:08

## 2025-01-29 ASSESSMENT — PAIN - FUNCTIONAL ASSESSMENT
PAIN_FUNCTIONAL_ASSESSMENT: ACTIVITIES ARE NOT PREVENTED

## 2025-01-29 ASSESSMENT — PAIN DESCRIPTION - LOCATION
LOCATION: CHEST
LOCATION: RIB CAGE
LOCATION: RIB CAGE
LOCATION: CHEST
LOCATION: SHOULDER
LOCATION: CHEST

## 2025-01-29 ASSESSMENT — PAIN DESCRIPTION - PAIN TYPE
TYPE: ACUTE PAIN;SURGICAL PAIN

## 2025-01-29 ASSESSMENT — PAIN DESCRIPTION - ORIENTATION
ORIENTATION: MID;LOWER
ORIENTATION: MID;LOWER
ORIENTATION: RIGHT;LEFT;POSTERIOR
ORIENTATION: MID;LOWER
ORIENTATION: LEFT;RIGHT
ORIENTATION: LEFT;RIGHT

## 2025-01-29 ASSESSMENT — PAIN DESCRIPTION - DESCRIPTORS
DESCRIPTORS: ACHING
DESCRIPTORS: ACHING
DESCRIPTORS: ACHING;SORE

## 2025-01-29 ASSESSMENT — PAIN DESCRIPTION - FREQUENCY
FREQUENCY: CONTINUOUS

## 2025-01-29 ASSESSMENT — PAIN SCALES - WONG BAKER
WONGBAKER_NUMERICALRESPONSE: HURTS A LITTLE BIT
WONGBAKER_NUMERICALRESPONSE: NO HURT

## 2025-01-29 ASSESSMENT — PAIN SCALES - GENERAL
PAINLEVEL_OUTOF10: 4
PAINLEVEL_OUTOF10: 7
PAINLEVEL_OUTOF10: 4
PAINLEVEL_OUTOF10: 0
PAINLEVEL_OUTOF10: 4
PAINLEVEL_OUTOF10: 9
PAINLEVEL_OUTOF10: 0
PAINLEVEL_OUTOF10: 4
PAINLEVEL_OUTOF10: 6

## 2025-01-29 ASSESSMENT — PAIN DESCRIPTION - ONSET
ONSET: ON-GOING

## 2025-01-29 ASSESSMENT — PULMONARY FUNCTION TESTS
PIF_VALUE: 0
PIF_VALUE: 0

## 2025-01-29 NOTE — CONSULTS
[facility] ACUTE CARE PHYSICAL THERAPY EVALUATION  Rachel Mcdaniel, 1950, 2008/2008-A, 1/29/2025    History  Patient  has a past medical history of Allergic rhinitis, Breast cancer (HCC), Depression, Family history of coronary artery disease, Fibroids, Frozen shoulder, GERD (gastroesophageal reflux disease), H/O cardiovascular stress test, H/O chest x-ray, H/O echocardiogram, History of exercise stress test, History of left heart catheterization (LHC), History of transesophageal echocardiography (BELLE), Hx of  Carotid Doppler ultrasound, Hyperlipidemia, Hypertension, Hypothyroidism, and VHD (valvular heart disease).  Patient  has a past surgical history that includes Mastectomy (11/2005); Hysterectomy (1984); other surgical history (12/12/08); Cholecystectomy (10/24/2007); Breast surgery (6/9/2006); Breast lumpectomy (, 1/18/02); Thyroidectomy; Cardiac procedure (N/A, 1/23/2025); and Coronary artery bypass graft (N/A, 1/28/2025).    Recommendation: Facility for intensive rehabilitation, anticipate 3 hours per day and 5 days per week.    Subjective:  Patient states:  \"I have 10 grandkids\".    Pain:  7/10 pain in chest and ribs at surgical site at rest.    Communication with other providers:  RN approval for therapy session, Handoff to RN and PA, co-eval with OT Kaiser and OT zhao Al  Restrictions: general precautions, falls, sternal precautions, chest tubes x2, bundy, pacer wires, CVC    Home Setup/Prior level of function  Social/Functional History  Lives With: Son (DIL, grandkids)  Type of Home: House  Home Layout: Multi-level (Bedroom in basement, 13 steps down to basement)  Home Access: Stairs to enter without rails  Entrance Stairs - Number of Steps: 3  Bathroom Shower/Tub: Walk-in shower  Bathroom Equipment: None  Home Equipment: None  Has the patient had two or more falls in the past year or any fall with injury in the past year?: No  Receives Help From: Family  Prior Level of Assist

## 2025-01-29 NOTE — PROGRESS NOTES
Patient arrived to CVICU at 1530 and connected to monitor.  Chest tubes x4 assessed and connected to suction.   RT at bedside to place pt on ventilator.  Report received from OR RN and anesthesia.   Labs & ABG/EPOC collected.   Drips infusing from OR: Insulin, Cardene, Levophed  Left femoral arterial line intact and in place.    Ventricular wires connected to pacer box.   Assessment completed, see documentation.   Care plan ongoing. Vital signs stable.   All needs met at this time.

## 2025-01-29 NOTE — CARE COORDINATION
Spoke to Marlen MORENO. Patient is POD #1 - PT/OT evals today. Will revisit after evals to discuss. Amada Barnett RN

## 2025-01-29 NOTE — PROGRESS NOTES
Dr Byrd updated on patient's current hemodynamics and all current drips. Notified that CVP=6 and patient had Jx=794 on POC. Orders to d/c the 0.9% NS@50ml/hr drip and to place order for D5 1/2NS@75ml/hr. Also asked regarding L fem art line. Orders given to keep patient in bed while L fem art line in place. Per Dr Byrd, ok to have Intensivist try for R radial A line tonight if time permits, and to only remove L fem art line once R radial placed, otherwise keep L fem art line until morning rounds. Dr Patricia notified.

## 2025-01-29 NOTE — PROGRESS NOTES
[facility] Cardiothoracic Surgery  Daily Progress Note    Surgeon:  Dr. Byrd       Subjective:  Ms. Mcdaniel is a 74 y.o. year-old female status post CORONARY ARTERY BYPASS GRAFT X; LEFT RADIAL ARTERY-LAD; SVG-DIAG; SVG-OM1; LEFT ENDOSCOPIC GREATER SAPHENOUS VEIN HARVEST; LEFT ENDOSCOPIC RADIAL ARETY HARVEST; LEFT ATRIAL APPENDAGE LIGATION WITH A 45MM ATRICLIP; BELLE;  on 1/28/25.      Patient was seen and examined at bedside this morning without any complaints.    Gtts: Cardene at 2.5    Hospital Course:  1/27/25: Patient was brought to the hospital for BELLE.  Direct admitted by Dr. Byrd to preop for surgical intervention.  Preoperative testing complete.  1/28/2025: Patient underwent surgical intervention.  Tolerated the procedure well. Transferred to the CVICU in a stable condition.   1/29/25: Gtts: Cardene at 2.5.  Start Norvasc 2.5 mg daily then discontinue the Cardene 4 hours after.  On 4 L nasal cannula of oxygen.  Wean as tolerated.  Arterial line removed this morning.  Maintain bedrest for 4 hours then okay to get up and work with physical therapy.  Remove [place].  Possibly remove hard mediastinal chest tube after patient walks today.  Maintain transcutaneous pacing wires.    Vital Signs: BP (!) 126/55   Pulse 86   Temp 99.9 °F (37.7 °C) (Core)   Resp 30   Ht 1.6 m (5' 3\")   Wt 53.3 kg (117 lb 8.1 oz)   SpO2 98%   BMI 20.82 kg/m²  O2 Flow Rate (L/min): 4 L/min   Admit Weight: Weight - Scale: 61.8 kg (136 lb 3.2 oz)   WEIGHTWeight - Scale: 53.3 kg (117 lb 8.1 oz)     I/O's:  I/O last 3 completed shifts:  In: 4450.6 [P.O.:300; I.V.:3451.7; Blood:250; IV Piggyback:448.9]  Out: 2385 [Urine:1410; Blood:300; Chest Tube:675]    Data:    CBC:   Recent Labs     01/27/25  1646 01/28/25  1544 01/29/25  0400   WBC 8.6 20.3* 14.4*   HGB 14.1 9.6* 8.8*   HCT 43.9 29.9* 27.7*   MCV 91.3 92.6 93.9    130* 143     BMP:   Recent Labs     01/27/25  6866

## 2025-01-29 NOTE — PROGRESS NOTES
onsult Note 25        NAME: Rachel Mcdaniel  : 1950  MRN: [id number]      Assessment/Plan:  Rachel Mcdaniel is a 74 y.o. 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 hypertension and hyperlipidemia who presented to [facility] 2025 for left heart cath  POD#0 following CABGx3,   critical care consultation re: mechanical ventilation, insulin infusion, anticipated extubation within 6hrs  Insulin infusion transition to sliding scale overage + long acting insulin    Problem list  POD#1 CORONARY ARTERY BYPASS GRAFT X; LEFT RADIAL ARTERY-LAD; SVG-DIAG; SVG-OM1; LEFT ENDOSCOPIC GREATER SAPHENOUS VEIN HARVEST; LEFT ENDOSCOPIC RADIAL ARTERY HARVEST; LEFT ATRIAL APPENDAGE LIGATION WITH A 45MM ATRICLIP; BELLE;   extubated  Insulin infusion  PA catheter readjusted yesterday  Femoral arterial line out      Neuro: extubated yesterday without complications  Cardio:  PA cath goal; ScVo2 >70, CO>4, CI>2; cardene infusion; CXTx4 remain- minimal output   Resp: deep breath, cough, acapella  GI: diet advance medications as needed  RENAL: elyte corection prn; bundy cath for strict I/O  Heme:  repeat labs pending; minimal drainage from all chest tubes- stripping for avoidance of clot/missed volume  ID: preop abx admin per surgical plan  Endo: insulin infusion for 24, then transition to sliding scale for glycemic control a planned  MSK: left femoral arterial line,left endoscopic radial artery harvest    Tubes/Lines/Drains:  pacer wires/paced; CXTx4, RIJ PA cath TLC, PIV, pacer wires,     Code Status: FULL    Thank you for allowing us to participate in the care of your patient. For any questions, please call.       Chief Complaint / Reason for Consult:  Mechanical ventilation  Insulin infusion  CABGx3 POD#0    History of Present Illness:  BELLE was reviwed: mild AI and mild to moderate MR.  LV function is good.    Left Ventricle: Normal left

## 2025-01-29 NOTE — PROGRESS NOTES
Occupational Therapy    [facility] ACUTE CARE OCCUPATIONAL THERAPY EVALUATION    Rachel Mcdaniel, 1950, 2008/2008-A, 1/29/2025    Discharge Recommendation: Encourage intensive OT services at discharge, typically 3 hours/day, 5 days/week    Subjective:  Patient states: \"I don't know what my family is going to do for their meals the next few weeks!\" (Pt stated she normally does all cooking for household)  Pain: Pt reported 7/10 surgical pain in chest and ribs at rest  Communication with other providers: PT Mónica, RN Kinza  Restrictions: General Precautions, High Fall Risk, Sternal Precautions, Chest Tube (x2), [place], Telemetry, Pulse Ox, BP cuff, 4L o2, Bed/chair alarm    Home Setup/Prior level of function:  Social/Functional History  Lives With: Son (ROBERTO CARLOS, grandkids)  Type of Home: House  Home Layout: Multi-level (Bedroom in basement, 13 steps down to basement)  Home Access: Stairs to enter without rails  Entrance Stairs - Number of Steps: 3  Bathroom Shower/Tub: Walk-in shower  Bathroom Equipment: None  Home Equipment: None  Has the patient had two or more falls in the past year or any fall with injury in the past year?: No  Receives Help From: Family  Prior Level of Assist for ADLs: Independent  Prior Level of Assist for Homemaking: Independent (pt reports doing all cooking for household)  Prior Level of Assist for Ambulation: Independent community ambulator (uses no AD)  Prior Level of Assist for Transfers: Independent  Active : Yes  Occupation: Retired    Examination:  Observation: Sitting in chair upon OT arrival. Very groggy/sleepy throughout session but pleasant and cooperative with evaluation.  Vision: WFL (Glasses)  Hearing: WFL  Vitals: Stable vitals throughout session on 4L o2    Body Systems and functions:  ROM: WFL all joints in BL UEs (active shoulder flexion kept 0-90' due to sternal precautions)  Strength: WFL all major muscle groups BL UEs observed functionally  Sensation: WFL  (denies numbness/tingling)  Tone: Normal  Coordination: Decreased speed in BL UEs due to pt's grogginess    Activities of Daily Living (ADLs):  Feeding: Supervision/setup (for cutting up food and opening packages/containers)  Grooming: SBA/setup (seated; unable to tolerate in standing this date due to significant LE weakness)  UB bathing: Min A   LB bathing: Max A (reaching distal LEs, bottom, posterior thighs)  UB dressing: Dependent (donning clean robe seated at chair level)  LB dressing: Dependent (donning BL socks)  Toileting: Dependent (Browning in place; total assist required for a bowel movement at this time)    Cognitive and Psychosocial Functioning:  Overall cognitive status: WFL (pt very sleepy/groggy throughout session; A & O x 4)  Affect: Normal     Balance:   Sitting: CGA to Min A at edge of chair  Standing: Max A x 2 with cardiac walker; unable to achieve full knee or trunk extension this date; max verbal and tactile cues for upright posture    Functional Mobility:  Bed Mobility: Not observed. Pt received sitting in chair upon arrival.  Transfers: Max A x 2 sit to stand from chair, Max A x 2 stand to sit to chair (mod cues for technique/maintaining sternal precautions)  Ambulation: Unsafe/unable this date      AM-PAC 6 click short form for inpatient daily activity:   How much help from another person does the patient currently need... Unable  Dep A Lot  Max A A Lot   Mod A A Little  Min A A Little   CGA  SBA None   Mod I  Indep  Sup   1.  Putting on and taking off regular lower body clothing? [x] 1    [] 2   [] 2   [] 3   [] 3   [] 4      2. Bathing (including washing, rinsing, drying)? [] 1   [] 2   [x] 2 [] 3 [] 3 [] 4   3. Toileting, which includes using toilet, bedpan, or urinal? [x] 1    [] 2   [] 2   [] 3   [] 3   [] 4     4. Putting on and taking off regular upper body clothing? [x] 1   [] 2   [] 2   [] 3   [] 3    [] 4      5. Taking care of personal grooming such as brushing teeth? [] 1   [] 2

## 2025-01-29 NOTE — PROGRESS NOTES
Spiritual Health History and Assessment/Progress Note  [facility]    Spiritual/Emotional Needs,  ,  ,      Name: Rachel Mcdaniel MRN: [id number]    Age: 74 y.o.     Sex: female   Language: English   Church: [employer]   Coronary artery disease (CAD) excluded     Date: 1/29/2025            Total Time Calculated: 4 min              [facility]        Referral/Consult From: Rounding   Encounter Overview/Reason: Spiritual/Emotional Needs  Service Provided For: Patient and family together    Lili, Belief, Meaning:   Patient identifies as spiritual, is connected with a lili tradition or spiritual practice, and has beliefs or practices that help with coping during difficult times  Family/Friends identify as spiritual, are connected with a lili tradition or spiritual practice, and have beliefs or practices that help with coping during difficult times      Importance and Influence:  Patient has spiritual/personal beliefs that influence decisions regarding their health  Family/Friends have spiritual/personal beliefs that influence decisions regarding the patient's health    Community:  Patient is connected with a spiritual community and feels well-supported. Support system includes: Children, Lili Community, and Extended family  Family/Friends feel well-supported. Support system includes: Parent/s, Lili Community, and Extended family    Assessment and Plan of Care:     Patient Interventions include: Facilitated expression of thoughts and feelings  Family/Friends Interventions include: Facilitated expression of thoughts and feelings    Patient Plan of Care: Spiritual Care available upon further referral  Family/Friends Plan of Care: Spiritual Care available upon further referral    Electronically signed by Chaplain Elliot on 1/29/2025 at 4:29 PM

## 2025-01-29 NOTE — CONSULTS
CARDIOLOGY CONSULT NOTE   Reason for consultation: Post CABG    Referring physician:  Tunde Byrd MD     Primary care physician: Arnoldo Wheeler MD      Dear  DrSal Byrd MD   Thanks for the consult.    Chief Complaints : Coronary artery disease    History of present illness:Rachel is a 74 y.o.year old who underwent CABG.  Cardiology was asked to evaluate for comanagement.    Past medical history:    has a past medical history of Allergic rhinitis, Breast cancer (HCC), Depression, Family history of coronary artery disease, Fibroids, Frozen shoulder, GERD (gastroesophageal reflux disease), H/O cardiovascular stress test, H/O chest x-ray, H/O echocardiogram, History of exercise stress test, History of left heart catheterization (LHC), History of transesophageal echocardiography (BELLE), Hx of  Carotid Doppler ultrasound, Hyperlipidemia, Hypertension, Hypothyroidism, and VHD (valvular heart disease).  Past surgical history:   has a past surgical history that includes Mastectomy (11/2005); Hysterectomy (1984); other surgical history (12/12/08); Cholecystectomy (10/24/2007); Breast surgery (6/9/2006); Breast lumpectomy (, 1/18/02); Thyroidectomy; Cardiac procedure (N/A, 1/23/2025); and Coronary artery bypass graft (N/A, 1/28/2025).  Social History:   reports that she has never smoked. She has never used smokeless tobacco. She reports that she does not drink alcohol and does not use drugs.  Family history:   no family history of CAD, STROKE of DM at early age    Allergies   Allergen Reactions    Pcn [Penicillins] Hives    Macrobid [Nitrofurantoin Monohyd Macro]     Naproxen Nausea Only    Codeine Rash    Sulfa Antibiotics Rash    Sulfamethazine Rash    Trimethoprim Rash       sodium chloride flush 0.9 % injection 5-40 mL, 2 times per day  sodium chloride flush 0.9 % injection 5-40 mL, PRN  0.9 % sodium chloride infusion, PRN  amLODIPine (NORVASC) tablet 2.5 mg, Daily  levothyroxine (SYNTHROID)

## 2025-01-29 NOTE — PROGRESS NOTES
0700: Dr Byrd rounding at bedside. Verbal orders given to remove L fem arterial line and hold pressure for 15 mins.  0705: L fem art line removed and manual pressure held  0720: Manual pressure stopped, hemostasis achieved and no issues at site. Site dressed, will continue to monitor.

## 2025-01-29 NOTE — PROGRESS NOTES
Rounds with  acknowledged new orders to remove PA cath and to start meds(see MAR). Daily goal discussed with patient and significant others @bedside.

## 2025-01-30 ENCOUNTER — APPOINTMENT (OUTPATIENT)
Dept: GENERAL RADIOLOGY | Age: 75
DRG: 236 | End: 2025-01-30
Attending: THORACIC SURGERY (CARDIOTHORACIC VASCULAR SURGERY)
Payer: MEDICARE

## 2025-01-30 LAB
ANION GAP SERPL CALCULATED.3IONS-SCNC: 9 MMOL/L (ref 9–17)
ARTERIAL PATENCY WRIST A: ABNORMAL
BODY TEMPERATURE: 37
BUN SERPL-MCNC: 24 MG/DL (ref 7–20)
CA-I BLD-SCNC: 1.24 MMOL/L (ref 1.15–1.33)
CALCIUM SERPL-MCNC: 8.3 MG/DL (ref 8.3–10.6)
CHLORIDE SERPL-SCNC: 110 MMOL/L (ref 99–110)
CO2 SERPL-SCNC: 23 MMOL/L (ref 21–32)
COHGB MFR BLD: 0.3 % (ref 0.5–1.5)
CREAT SERPL-MCNC: 0.7 MG/DL (ref 0.6–1.2)
ERYTHROCYTE [DISTWIDTH] IN BLOOD BY AUTOMATED COUNT: 14.4 % (ref 11.7–14.9)
GFR, ESTIMATED: 78 ML/MIN/1.73M2
GLUCOSE BLD-MCNC: 110 MG/DL (ref 74–99)
GLUCOSE BLD-MCNC: 137 MG/DL (ref 74–99)
GLUCOSE BLD-MCNC: 161 MG/DL (ref 74–99)
GLUCOSE BLD-MCNC: 186 MG/DL (ref 74–99)
GLUCOSE BLD-MCNC: 200 MG/DL (ref 74–99)
GLUCOSE SERPL-MCNC: 105 MG/DL (ref 74–99)
HCO3 VENOUS: 21.7 MMOL/L (ref 22–29)
HCT VFR BLD AUTO: 26.6 % (ref 37–47)
HGB BLD-MCNC: 8.4 G/DL (ref 12.5–16)
MAGNESIUM SERPL-MCNC: 2.3 MG/DL (ref 1.8–2.4)
MCH RBC QN AUTO: 30.5 PG (ref 27–31)
MCHC RBC AUTO-ENTMCNC: 31.6 G/DL (ref 32–36)
MCV RBC AUTO: 96.7 FL (ref 78–100)
METHEMOGLOBIN: 0.7 % (ref 0.5–1.5)
NEGATIVE BASE EXCESS, VEN: 3.6 MMOL/L (ref 0–3)
OXYHGB MFR BLD: 75.4 %
PCO2 VENOUS: 40.3 MM HG (ref 38–54)
PH VENOUS: 7.35 (ref 7.32–7.43)
PHOSPHATE SERPL-MCNC: 3 MG/DL (ref 2.5–4.9)
PLATELET # BLD AUTO: 142 K/UL (ref 140–440)
PMV BLD AUTO: 10.7 FL (ref 7.5–11.1)
PO2 VENOUS: 41 MM HG (ref 23–48)
POTASSIUM SERPL-SCNC: 4.6 MMOL/L (ref 3.5–5.1)
RBC # BLD AUTO: 2.75 M/UL (ref 4.2–5.4)
SODIUM SERPL-SCNC: 141 MMOL/L (ref 136–145)
T4 FREE SERPL-MCNC: 2.4 NG/DL (ref 0.9–1.8)
WBC OTHER # BLD: 21.4 K/UL (ref 4–10.5)

## 2025-01-30 PROCEDURE — 6370000000 HC RX 637 (ALT 250 FOR IP): Performed by: INTERNAL MEDICINE

## 2025-01-30 PROCEDURE — 97530 THERAPEUTIC ACTIVITIES: CPT

## 2025-01-30 PROCEDURE — 2580000003 HC RX 258: Performed by: THORACIC SURGERY (CARDIOTHORACIC VASCULAR SURGERY)

## 2025-01-30 PROCEDURE — 94150 VITAL CAPACITY TEST: CPT

## 2025-01-30 PROCEDURE — 71045 X-RAY EXAM CHEST 1 VIEW: CPT

## 2025-01-30 PROCEDURE — 6370000000 HC RX 637 (ALT 250 FOR IP): Performed by: PHYSICIAN ASSISTANT

## 2025-01-30 PROCEDURE — 80048 BASIC METABOLIC PNL TOTAL CA: CPT

## 2025-01-30 PROCEDURE — 83735 ASSAY OF MAGNESIUM: CPT

## 2025-01-30 PROCEDURE — 84100 ASSAY OF PHOSPHORUS: CPT

## 2025-01-30 PROCEDURE — 82330 ASSAY OF CALCIUM: CPT

## 2025-01-30 PROCEDURE — 99233 SBSQ HOSP IP/OBS HIGH 50: CPT | Performed by: INTERNAL MEDICINE

## 2025-01-30 PROCEDURE — 6370000000 HC RX 637 (ALT 250 FOR IP): Performed by: THORACIC SURGERY (CARDIOTHORACIC VASCULAR SURGERY)

## 2025-01-30 PROCEDURE — 36415 COLL VENOUS BLD VENIPUNCTURE: CPT

## 2025-01-30 PROCEDURE — P9047 ALBUMIN (HUMAN), 25%, 50ML: HCPCS | Performed by: PHYSICIAN ASSISTANT

## 2025-01-30 PROCEDURE — 6360000002 HC RX W HCPCS: Performed by: PHYSICIAN ASSISTANT

## 2025-01-30 PROCEDURE — 2500000003 HC RX 250 WO HCPCS: Performed by: THORACIC SURGERY (CARDIOTHORACIC VASCULAR SURGERY)

## 2025-01-30 PROCEDURE — 6360000002 HC RX W HCPCS: Performed by: THORACIC SURGERY (CARDIOTHORACIC VASCULAR SURGERY)

## 2025-01-30 PROCEDURE — 82805 BLOOD GASES W/O2 SATURATION: CPT

## 2025-01-30 PROCEDURE — 94669 MECHANICAL CHEST WALL OSCILL: CPT

## 2025-01-30 PROCEDURE — 76937 US GUIDE VASCULAR ACCESS: CPT

## 2025-01-30 PROCEDURE — 82962 GLUCOSE BLOOD TEST: CPT

## 2025-01-30 PROCEDURE — 2100000000 HC CCU R&B

## 2025-01-30 PROCEDURE — 36569 INSJ PICC 5 YR+ W/O IMAGING: CPT

## 2025-01-30 PROCEDURE — 94640 AIRWAY INHALATION TREATMENT: CPT

## 2025-01-30 PROCEDURE — 94761 N-INVAS EAR/PLS OXIMETRY MLT: CPT

## 2025-01-30 PROCEDURE — 2500000003 HC RX 250 WO HCPCS: Performed by: PHYSICIAN ASSISTANT

## 2025-01-30 PROCEDURE — 02HV33Z INSERTION OF INFUSION DEVICE INTO SUPERIOR VENA CAVA, PERCUTANEOUS APPROACH: ICD-10-PCS | Performed by: THORACIC SURGERY (CARDIOTHORACIC VASCULAR SURGERY)

## 2025-01-30 PROCEDURE — 85027 COMPLETE CBC AUTOMATED: CPT

## 2025-01-30 PROCEDURE — C1751 CATH, INF, PER/CENT/MIDLINE: HCPCS

## 2025-01-30 PROCEDURE — 2700000000 HC OXYGEN THERAPY PER DAY

## 2025-01-30 PROCEDURE — 2709999900 HC NON-CHARGEABLE SUPPLY

## 2025-01-30 PROCEDURE — 92610 EVALUATE SWALLOWING FUNCTION: CPT

## 2025-01-30 PROCEDURE — 2580000003 HC RX 258: Performed by: PHYSICIAN ASSISTANT

## 2025-01-30 RX ORDER — FUROSEMIDE 10 MG/ML
40 INJECTION INTRAMUSCULAR; INTRAVENOUS 2 TIMES DAILY
Status: DISCONTINUED | OUTPATIENT
Start: 2025-01-30 | End: 2025-02-05

## 2025-01-30 RX ORDER — FUROSEMIDE 10 MG/ML
40 INJECTION INTRAMUSCULAR; INTRAVENOUS ONCE
Status: COMPLETED | OUTPATIENT
Start: 2025-01-30 | End: 2025-01-30

## 2025-01-30 RX ORDER — POTASSIUM CHLORIDE 1500 MG/1
20 TABLET, EXTENDED RELEASE ORAL ONCE
Status: COMPLETED | OUTPATIENT
Start: 2025-01-30 | End: 2025-01-30

## 2025-01-30 RX ORDER — METOPROLOL TARTRATE 25 MG/1
25 TABLET, FILM COATED ORAL 2 TIMES DAILY
Status: DISCONTINUED | OUTPATIENT
Start: 2025-01-30 | End: 2025-01-31

## 2025-01-30 RX ORDER — METOPROLOL TARTRATE 25 MG/1
12.5 TABLET, FILM COATED ORAL 2 TIMES DAILY
Status: DISCONTINUED | OUTPATIENT
Start: 2025-01-30 | End: 2025-01-30

## 2025-01-30 RX ORDER — HYDRALAZINE HYDROCHLORIDE 20 MG/ML
10 INJECTION INTRAMUSCULAR; INTRAVENOUS
Status: ACTIVE | OUTPATIENT
Start: 2025-01-30 | End: 2025-01-31

## 2025-01-30 RX ORDER — LIDOCAINE 4 G/G
2 PATCH TOPICAL DAILY
Status: DISCONTINUED | OUTPATIENT
Start: 2025-01-30 | End: 2025-02-05 | Stop reason: HOSPADM

## 2025-01-30 RX ORDER — ALBUMIN (HUMAN) 12.5 G/50ML
12.5 SOLUTION INTRAVENOUS ONCE
Status: COMPLETED | OUTPATIENT
Start: 2025-01-30 | End: 2025-01-30

## 2025-01-30 RX ORDER — POTASSIUM CHLORIDE 1500 MG/1
20 TABLET, EXTENDED RELEASE ORAL 2 TIMES DAILY WITH MEALS
Status: DISCONTINUED | OUTPATIENT
Start: 2025-01-30 | End: 2025-02-03

## 2025-01-30 RX ADMIN — SENNOSIDES AND DOCUSATE SODIUM 1 TABLET: 50; 8.6 TABLET ORAL at 08:21

## 2025-01-30 RX ADMIN — ENOXAPARIN SODIUM 40 MG: 100 INJECTION SUBCUTANEOUS at 08:24

## 2025-01-30 RX ADMIN — ACETAMINOPHEN 650 MG: 325 TABLET ORAL at 08:19

## 2025-01-30 RX ADMIN — ONDANSETRON 4 MG: 2 INJECTION INTRAMUSCULAR; INTRAVENOUS at 12:30

## 2025-01-30 RX ADMIN — GUAIFENESIN 1200 MG: 600 TABLET ORAL at 08:21

## 2025-01-30 RX ADMIN — Medication 1 TABLET: at 08:22

## 2025-01-30 RX ADMIN — MUPIROCIN: 20 OINTMENT TOPICAL at 08:22

## 2025-01-30 RX ADMIN — GABAPENTIN 300 MG: 300 CAPSULE ORAL at 15:21

## 2025-01-30 RX ADMIN — IPRATROPIUM BROMIDE AND ALBUTEROL SULFATE 1 DOSE: 2.5; .5 SOLUTION RESPIRATORY (INHALATION) at 09:03

## 2025-01-30 RX ADMIN — ACETAMINOPHEN 650 MG: 325 TABLET ORAL at 17:04

## 2025-01-30 RX ADMIN — MUPIROCIN: 20 OINTMENT TOPICAL at 22:07

## 2025-01-30 RX ADMIN — AMLODIPINE BESYLATE 2.5 MG: 5 TABLET ORAL at 08:19

## 2025-01-30 RX ADMIN — ACETAMINOPHEN 650 MG: 325 TABLET ORAL at 01:03

## 2025-01-30 RX ADMIN — AMIODARONE HYDROCHLORIDE 150 MG: 50 INJECTION, SOLUTION INTRAVENOUS at 09:39

## 2025-01-30 RX ADMIN — AMIODARONE HYDROCHLORIDE 200 MG: 200 TABLET ORAL at 21:54

## 2025-01-30 RX ADMIN — BACITRACIN: 500 OINTMENT TOPICAL at 08:00

## 2025-01-30 RX ADMIN — AMIODARONE HYDROCHLORIDE 0.5 MG/MIN: 50 INJECTION, SOLUTION INTRAVENOUS at 17:30

## 2025-01-30 RX ADMIN — DEXTROSE MONOHYDRATE 150 MG: 50 INJECTION, SOLUTION INTRAVENOUS at 14:49

## 2025-01-30 RX ADMIN — INSULIN LISPRO 2 UNITS: 100 INJECTION, SOLUTION INTRAVENOUS; SUBCUTANEOUS at 17:17

## 2025-01-30 RX ADMIN — ACETAMINOPHEN 650 MG: 325 TABLET ORAL at 04:21

## 2025-01-30 RX ADMIN — INSULIN LISPRO 2 UNITS: 100 INJECTION, SOLUTION INTRAVENOUS; SUBCUTANEOUS at 12:30

## 2025-01-30 RX ADMIN — CHLORHEXIDINE GLUCONATE 0.12% ORAL RINSE 15 ML: 1.2 LIQUID ORAL at 08:22

## 2025-01-30 RX ADMIN — SODIUM CHLORIDE, PRESERVATIVE FREE 10 ML: 5 INJECTION INTRAVENOUS at 19:51

## 2025-01-30 RX ADMIN — GABAPENTIN 300 MG: 300 CAPSULE ORAL at 08:19

## 2025-01-30 RX ADMIN — METOPROLOL TARTRATE 12.5 MG: 25 TABLET, FILM COATED ORAL at 09:03

## 2025-01-30 RX ADMIN — WATER 1000 MG: 1 INJECTION INTRAMUSCULAR; INTRAVENOUS; SUBCUTANEOUS at 07:00

## 2025-01-30 RX ADMIN — FAMOTIDINE 20 MG: 20 TABLET, FILM COATED ORAL at 21:54

## 2025-01-30 RX ADMIN — METOPROLOL TARTRATE 25 MG: 25 TABLET, FILM COATED ORAL at 19:53

## 2025-01-30 RX ADMIN — ACETAMINOPHEN 650 MG: 325 TABLET ORAL at 12:31

## 2025-01-30 RX ADMIN — SODIUM CHLORIDE, PRESERVATIVE FREE 10 ML: 5 INJECTION INTRAVENOUS at 08:00

## 2025-01-30 RX ADMIN — AMIODARONE HYDROCHLORIDE 200 MG: 200 TABLET ORAL at 08:21

## 2025-01-30 RX ADMIN — LEVOTHYROXINE SODIUM 150 MCG: 0.07 TABLET ORAL at 07:00

## 2025-01-30 RX ADMIN — POTASSIUM CHLORIDE 20 MEQ: 1500 TABLET, EXTENDED RELEASE ORAL at 17:18

## 2025-01-30 RX ADMIN — LATANOPROST 1 DROP: 50 SOLUTION OPHTHALMIC at 22:07

## 2025-01-30 RX ADMIN — POLYETHYLENE GLYCOL (3350) 17 G: 17 POWDER, FOR SOLUTION ORAL at 08:22

## 2025-01-30 RX ADMIN — CHLORHEXIDINE GLUCONATE 0.12% ORAL RINSE 15 ML: 1.2 LIQUID ORAL at 21:55

## 2025-01-30 RX ADMIN — SODIUM CHLORIDE, PRESERVATIVE FREE 10 ML: 5 INJECTION INTRAVENOUS at 19:52

## 2025-01-30 RX ADMIN — CLOPIDOGREL BISULFATE 75 MG: 75 TABLET ORAL at 08:21

## 2025-01-30 RX ADMIN — FUROSEMIDE 40 MG: 10 INJECTION, SOLUTION INTRAMUSCULAR; INTRAVENOUS at 17:18

## 2025-01-30 RX ADMIN — IPRATROPIUM BROMIDE AND ALBUTEROL SULFATE 1 DOSE: 2.5; .5 SOLUTION RESPIRATORY (INHALATION) at 15:45

## 2025-01-30 RX ADMIN — AMIODARONE HYDROCHLORIDE 1 MG/MIN: 50 INJECTION, SOLUTION INTRAVENOUS at 08:05

## 2025-01-30 RX ADMIN — WATER 2000 MG: 1 INJECTION INTRAMUSCULAR; INTRAVENOUS; SUBCUTANEOUS at 04:21

## 2025-01-30 RX ADMIN — GABAPENTIN 300 MG: 300 CAPSULE ORAL at 21:55

## 2025-01-30 RX ADMIN — ATORVASTATIN CALCIUM 40 MG: 40 TABLET, FILM COATED ORAL at 21:54

## 2025-01-30 RX ADMIN — GUAIFENESIN 1200 MG: 600 TABLET ORAL at 22:06

## 2025-01-30 RX ADMIN — POTASSIUM CHLORIDE 20 MEQ: 1500 TABLET, EXTENDED RELEASE ORAL at 07:00

## 2025-01-30 RX ADMIN — FUROSEMIDE 40 MG: 10 INJECTION, SOLUTION INTRAMUSCULAR; INTRAVENOUS at 07:00

## 2025-01-30 RX ADMIN — BACITRACIN: 500 OINTMENT TOPICAL at 22:07

## 2025-01-30 RX ADMIN — DEXTROSE MONOHYDRATE 150 MG: 50 INJECTION, SOLUTION INTRAVENOUS at 07:53

## 2025-01-30 RX ADMIN — ASPIRIN 81 MG CHEWABLE TABLET 81 MG: 81 TABLET CHEWABLE at 08:19

## 2025-01-30 RX ADMIN — ALBUMIN (HUMAN) 12.5 G: 0.25 INJECTION, SOLUTION INTRAVENOUS at 17:35

## 2025-01-30 ASSESSMENT — PAIN DESCRIPTION - LOCATION
LOCATION: RIB CAGE
LOCATION: RIB CAGE

## 2025-01-30 ASSESSMENT — PAIN SCALES - GENERAL
PAINLEVEL_OUTOF10: 0
PAINLEVEL_OUTOF10: 4
PAINLEVEL_OUTOF10: 2

## 2025-01-30 ASSESSMENT — PAIN DESCRIPTION - ORIENTATION
ORIENTATION: MID
ORIENTATION: MID

## 2025-01-30 ASSESSMENT — PAIN SCALES - WONG BAKER
WONGBAKER_NUMERICALRESPONSE: NO HURT
WONGBAKER_NUMERICALRESPONSE: HURTS A LITTLE BIT
WONGBAKER_NUMERICALRESPONSE: NO HURT
WONGBAKER_NUMERICALRESPONSE: HURTS LITTLE MORE
WONGBAKER_NUMERICALRESPONSE: NO HURT
WONGBAKER_NUMERICALRESPONSE: NO HURT

## 2025-01-30 ASSESSMENT — PAIN DESCRIPTION - DESCRIPTORS
DESCRIPTORS: SORE
DESCRIPTORS: SORE

## 2025-01-30 ASSESSMENT — PAIN DESCRIPTION - ONSET
ONSET: GRADUAL
ONSET: GRADUAL

## 2025-01-30 ASSESSMENT — PAIN DESCRIPTION - PAIN TYPE
TYPE: ACUTE PAIN;SURGICAL PAIN
TYPE: ACUTE PAIN;SURGICAL PAIN

## 2025-01-30 ASSESSMENT — PAIN DESCRIPTION - FREQUENCY
FREQUENCY: INTERMITTENT
FREQUENCY: INTERMITTENT

## 2025-01-30 ASSESSMENT — PAIN - FUNCTIONAL ASSESSMENT
PAIN_FUNCTIONAL_ASSESSMENT: PREVENTS OR INTERFERES SOME ACTIVE ACTIVITIES AND ADLS
PAIN_FUNCTIONAL_ASSESSMENT: PREVENTS OR INTERFERES SOME ACTIVE ACTIVITIES AND ADLS

## 2025-01-30 NOTE — PROGRESS NOTES
[facility] Cardiothoracic Surgery  Daily Progress Note    Surgeon:  Dr. Byrd       Subjective:  Ms. Mcdaniel is a 74 y.o. year-old female status post CORONARY ARTERY BYPASS GRAFT X; LEFT RADIAL ARTERY-LAD; SVG-DIAG; SVG-OM1; LEFT ENDOSCOPIC GREATER SAPHENOUS VEIN HARVEST; LEFT ENDOSCOPIC RADIAL ARETY HARVEST; LEFT ATRIAL APPENDAGE LIGATION WITH A 45MM ATRICLIP; BELLE;  on 1/28/25.      Patient was seen and examined at bedside this morning without any complaints.  Much more awake today.  Continues to be nauseous.    Gtts: Cardene at 2.5    Hospital Course:  1/27/25: Patient was brought to the hospital for BELLE.  Direct admitted by Dr. Byrd to preop for surgical intervention.  Preoperative testing complete.  1/28/2025: Patient underwent surgical intervention.  Tolerated the procedure well. Transferred to the CVICU in a stable condition.   1/29/25: Gtts: Cardene at 2.5.  Start Norvasc 2.5 mg daily then discontinue the Cardene 4 hours after.  On 4 L nasal cannula of oxygen.  Wean as tolerated.  Arterial line removed this morning.  Maintain bedrest for 4 hours then okay to get up and work with physical therapy.  Remove Superior.  Possibly remove hard mediastinal chest tube after patient walks today.  Maintain transcutaneous pacing wires.  Small mediastinal chest tube fell out in the evening.  Stroke alert was called on the patient due to lethargy and right-sided weakness.  All testing was negative.  1/30/25: Patient went into atrial fibrillation this morning.  Received 2 amiodarone boluses per Dr. Byrd and is on amiodarone drip at 1.  Cordis in place.  Dr. Byrd would like a PICC line and remove Cordis.  Started on Rocephin for 7 days due to increased white blood cell count and possible pneumonia per Dr. Byrd.  Speech therapy evaluated the patient for possible aspiration which was negative.  Lasix started 40 mg IV twice daily.  Lopressor started 12.5 mg

## 2025-01-30 NOTE — PROGRESS NOTES
Progress note: 25      NAME: Rachel Mcdaniel  : 1950  MRN: [id number]      Assessment/Plan:  Rachel Mcdaniel is a 74 y.o. 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 hypertension and hyperlipidemia who presented to [facility] 2025 for left heart cath  POD#0 following CABGx3,   critical care consultation re: mechanical ventilation, insulin infusion, anticipated extubation within 6hrs  Insulin infusion transition to sliding scale overage + long acting insulin    Problem list  POD#1 CORONARY ARTERY BYPASS GRAFT X; LEFT RADIAL ARTERY-LAD; SVG-DIAG; SVG-OM1; LEFT ENDOSCOPIC GREATER SAPHENOUS VEIN HARVEST; LEFT ENDOSCOPIC RADIAL ARTERY HARVEST; LEFT ATRIAL APPENDAGE LIGATION WITH A 45MM ATRICLIP; BELLE;   extubated  Insulin infusion  PA catheter readjusted yesterday  Femoral arterial line out      Neuro: extubated yesterday without complications  Cardio:  cxt removal  Resp: deep breath, cough, acapella  GI: diet advance medications as needed  RENAL: elyte corection prn; bundy cath for strict I/O  Heme:  repeat labs pending; minimal drainage from all chest tubes- stripping for avoidance of clot/missed volume  ID: preop abx admin per surgical plan  Endo: transition to sliding scale for glycemic control a planned 1400  MSK: left femoral arterial line,left endoscopic radial artery harvest    Tubes/Lines/Drains:  pacer wires/paced; CXTx4, RIJ PA cath TLC, PIV, pacer wires,     Code Status: FULL    Thank you for allowing us to participate in the care of your patient. For any questions, please call.       Chief Complaint / Reason for Consult:  Mechanical ventilation  Insulin infusion transition  CABGx3    History of Present Illness:  BELLE was reviwed: mild AI and mild to moderate MR.  LV function is good.    Left Ventricle: Normal left ventricular systolic function.    Interatrial Septum: Agitated saline study was negative without

## 2025-01-30 NOTE — CARE COORDINATION
Received potential referral for ARU.  Reviewed patients clinicals and PT/OT notes.  ARU will follow for progress with therapy.

## 2025-01-30 NOTE — PROGRESS NOTES
1349: Samira, Vascular Access RN at bedside at this time for PICC line placement. Samira provided education regarding PICC line insertion, pt stated \"I'm too tired to sign, can my son sign? He's my POA.\" ACP documentation verified; this RN witnessed consent.

## 2025-01-30 NOTE — PROGRESS NOTES
Occupational Therapy    Occupational Therapy Treatment Note    Name: Rachel Mcdaniel MRN: [id number] :   1950   Date:  2025   Admission Date: 2025 Room:  -A     Restrictions/Precautions:  General Precautions, High Fall Risk, Sternal Precautions, Chest Tube (x2), Browning, Telemetry, Pulse Ox, BP cuff, 4L o2, Bed/chair alarm     Communication with other providers: RN approved session, co tx with PTA for mobility.     Subjective:  Patient states:  Pt agreeable to therapy session.   Pain:   denies pain.     Objective:    Observation: Pt sitting in chair upon arrival.    Objective Measures:  Pt alert and oriented  and increased lethargy.    Treatment, including education:  Therapeutic Activity Training:   Therapeutic activity training was instructed today.  Cues were given for safety, sequence, UE/LE placement, awareness, and balance.    Activities performed today included bed mobility training, sup-sit, sit-stand, SPT.    Pt seated in chair upon arrival. Pt BP assessed noted 70/50's RN approved transfer back to bed.  Pt completed sit to stand from chair with MAX A x2 with arm in arm assist and completed SPT from chair to bed with MAX A x2 and seated on edge of bed. Pt returned supine in bed with MAX A x2, pt boosted in bed  with MAX A x2. Pt supine in bed with all needs met and call light in reach left with RN.     Assessment / Impression:    Patient's tolerance of treatment: Well  Adverse Reaction: None  Significant change in status and impact: Improved from initial evaluation  Barriers to improvement: None noted      Plan for Next Session:    Continue OT POC    Time in:  0957  Time out:  1015  Timed treatment minutes:  18  Total treatment time:  18      Electronically signed by:      GAYLE Ariza

## 2025-01-30 NOTE — PROGRESS NOTES
Physical Therapy Treatment Note  Name: Rachel Mcdaniel MRN: [id number] :   1950   Date:  2025   Admission Date: 2025 Room:  -A   Restrictions/Precautions:  general precautions, falls, sternal precautions, chest tubes x2, bundy, pacer wires, CVC   Communication with other providers:  Pt okay to see for therapy per RN, coordination with nursing for transfer.   Subjective:  Patient states:  Agreeable to session.   Pain:   Location, Type, Intensity (0/10 to 10/10):  Does not report pain.   Objective:    Observation:  Pt sitting up in chair upon therapy arrival, RN requests pt returns to bed.   Objective Measures:  Tele, hypotensive 70s/high 40s, improved to 90s/60s supine.   Treatment, including education/measures:    Max increased time for line management, assessment of BP, and session set-up (positioning chair, placing gait belt, adjusting pt position in chair, coordination with nursing)    Therapeutic Activity Training:   Therapeutic activity training was instructed today.  Cues were given for safety, sequence, UE/LE placement, awareness, and balance. Activities performed today included STS.     Mobility:  SPT: From recliner > EOB with  Max A x 2 and arm-in-arm assist.   Sit > sup: Max A x 2 at trunk and LEs to return to supine.   Boost to HOB: DEP x 2     Pt positioned with R hip pillow prop for offloading sacrum and correcting lean in bed.     Safety:   Pt returned safely to bed with bed alarm activated, call light in reach, all needs met.   Assessment / Impression:    Pt was able to tolerate transfer to bed, gait not appropriate this date dt hypotension and poor pt alertness.   Patient's tolerance of treatment:  Fair   Adverse Reaction: none  Significant change in status and impact:  none  Barriers to improvement:  none  Plan for Next Session:    Plan to continue OOB Activities.   Time in:  957  Time out:  1015  Timed treatment minutes:  18  Total treatment time:  18    Previously

## 2025-01-30 NOTE — CONSULTS
V2.0  Internal Medicine Consult      Name:  Rachel Mcdaniel /Age/Sex: 1950  (74 y.o. female)   MRN & CSN:  [id number] & [id number] Encounter Date/Time: 2025 8:28 PM EST   Location:  -A PCP: Arnoldo Wheeler MD       Hospital Day: 4      History of Present Illness:     Chief Complaint: s/p CABG    Rachel Mcdaniel is a 74 y.o. female with PMH of diastolic heart failure, HTN, MR and AI who is admitted under cardiothoracic surgery and underwent CABg surgery on 2025 which she tolerated well.     Pt has been having chest pain and she had positive stress test. She had C 2025 which showed triple vessel disease. Pt was intubated post op but was extubated in the evening.     Patient seen and examined at bedside. She has pain but under control. No new complaints or concerns.     Assessment and Plan:     CAD. S/p LHC 2025 with triple vessel disease  S/p CABG 2025  - on ASA, plavix and statin  - ctx managing  - chest tubes per ctx  - on amio drip    Essential HTN.  On home lopresor 25 mg BID and amlodipine 5 mg  - currently held     On home synthroid 150 mcg. Last TSH 0.03, free T4 2.4 2025.   TSH 0.02 2024  - recommend decreasing Synthroid 125 mcg   - check TSH, free T4 in 3-4 weeks           Disposition:   Per Primary    Diet ADULT DIET; Dysphagia - Soft and Bite Sized; 4 carb choices (60 gm/meal); Low Fat/Low Chol/High Fiber/2 gm Na  ADULT ORAL NUTRITION SUPPLEMENT; Breakfast, AM Snack, Lunch, PM Snack, Dinner; Standard High Calorie/High Protein Oral Supplement   DVT Prophylaxis [x] Lovenox, []  Heparin, [] SCDs, [] Ambulation,  [] Eliquis, [] Xarelto   Code Status Full Code     History from:     patient     Review of Systems: Need 10 Elements   See above           Objective:     Intake/Output Summary (Last 24 hours) at 2025 2217  Last data filed at 2025 1900  Gross per 24 hour   Intake 1010.05 ml   Output 2724 ml   Net -1713.95 ml      Vitals:   Vitals:

## 2025-01-30 NOTE — PLAN OF CARE
Problem: ABCDS Injury Assessment  Goal: Absence of physical injury  Outcome: Progressing     Problem: Safety - Adult  Goal: Free from fall injury  Outcome: Progressing     Problem: Discharge Planning  Goal: Discharge to home or other facility with appropriate resources  Outcome: Progressing     Problem: Pain  Goal: Verbalizes/displays adequate comfort level or baseline comfort level  Outcome: Progressing     Problem: Skin/Tissue Integrity  Goal: Skin integrity remains intact  Description: 1.  Monitor for areas of redness and/or skin breakdown  2.  Assess vascular access sites hourly  3.  Every 4-6 hours minimum:  Change oxygen saturation probe site  4.  Every 4-6 hours:  If on nasal continuous positive airway pressure, respiratory therapy assess nares and determine need for appliance change or resting period  Outcome: Progressing

## 2025-01-30 NOTE — PLAN OF CARE
Problem: ABCDS Injury Assessment  Goal: Absence of physical injury  1/30/2025 1137 by Ebony Richardson RN  Outcome: Progressing  1/30/2025 0127 by Kiley Maddox RN  Outcome: Progressing     Problem: Safety - Adult  Goal: Free from fall injury  1/30/2025 1137 by Ebony Richardson RN  Outcome: Progressing  1/30/2025 0127 by Kiley Maddox RN  Outcome: Progressing     Problem: Discharge Planning  Goal: Discharge to home or other facility with appropriate resources  1/30/2025 1137 by Ebony Richardson RN  Outcome: Progressing  1/30/2025 0127 by Kiley Maddox RN  Outcome: Progressing     Problem: Pain  Goal: Verbalizes/displays adequate comfort level or baseline comfort level  1/30/2025 1137 by Ebony Richardson RN  Outcome: Progressing  Flowsheets (Taken 1/30/2025 0800)  Verbalizes/displays adequate comfort level or baseline comfort level: Assess pain using appropriate pain scale  1/30/2025 0127 by Kiley Maddox RN  Outcome: Progressing     Problem: Skin/Tissue Integrity  Goal: Skin integrity remains intact  Description: 1.  Monitor for areas of redness and/or skin breakdown  2.  Assess vascular access sites hourly  3.  Every 4-6 hours minimum:  Change oxygen saturation probe site  4.  Every 4-6 hours:  If on nasal continuous positive airway pressure, respiratory therapy assess nares and determine need for appliance change or resting period  1/30/2025 1137 by Ebony Richardson RN  Outcome: Progressing  1/30/2025 0127 by Kiley Maddox RN  Outcome: Progressing

## 2025-01-30 NOTE — PROGRESS NOTES
Spiritual Health History and Assessment/Progress Note  [facility]    Spiritual/Emotional Needs,  ,  ,      Name: Rachel Mcdaniel MRN: [id number]    Age: 74 y.o.     Sex: female   Language: English   Mormonism: Taoism   Coronary artery disease (CAD) excluded     Date: 1/30/2025            Total Time Calculated: 5 min              [facility]        Referral/Consult From: Rounding   Encounter Overview/Reason: Spiritual/Emotional Needs  Service Provided For: Patient    Lili, Belief, Meaning:   Patient identifies as spiritual, is connected with a lili tradition or spiritual practice, and has beliefs or practices that help with coping during difficult times  Family/Friends identify as spiritual, are connected with a lili tradition or spiritual practice, and have beliefs or practices that help with coping during difficult times      Importance and Influence:  Patient has spiritual/personal beliefs that influence decisions regarding their health  Family/Friends have spiritual/personal beliefs that influence decisions regarding the patient's health    Community:  Patient is connected with a spiritual community and feels well-supported. Support system includes: Children, Lili Community, and Extended family  Family/Friends feel well-supported. Support system includes: Parent/s, Children, Lili Community, and Friends    Assessment and Plan of Care:     Patient Interventions include: Facilitated expression of thoughts and feelings  Family/Friends Interventions include: Facilitated expression of thoughts and feelings    Patient Plan of Care: Spiritual Care available upon further referral  Family/Friends Plan of Care: Spiritual Care available upon further referral    Electronically signed by Chaplain Elliot on 1/30/2025 at 11:34 AM

## 2025-01-30 NOTE — PROGRESS NOTES
[facility] transfer center reached out to get a facesheet on the patient stating that Dr. Tanner requested transfer on this patient. Talked to Dr. Cummings, he is in contact with neuro at [facility]. No need to move pt. At this time. Notified [facility] transfer center to cancel request for [facility] bed, Pt. Will remain here at this time.

## 2025-01-30 NOTE — PROGRESS NOTES
Stroke alert called around 7 PM for new-onset confusion and right arm weakness. Per nursing staff, was noted have worsening confusion, generalized weakness and right arm droop. No facial droop or aphasia. Exam non-focal per my assessment. LKW 4 PM 1/29. Had CABG x3 yesterday 1/28. Initial NIHSS 13. . Had tramadol around 1 PM. CTH and CTA H/N negative for acute changes or LVO, but showed moderate left ICA, mild right ICA, moderate bilateral V4, severe right M2 and severe left P2 stenosis. Telestroke consulted, no thrombolytic therapy recommended due to recent surgery, suspect metabolic cause / hypoactive delirium, can obtain MRI in 1-2 days if no improvement. Continue ASA, Plavix and Lipitor. PT/OT/SLP consulted, appreciate assistance. NPO pending swallow evaluation. Fall precautions. Telemetry. Continue neuro checks. Continue delirium precautions, avoid benzodiazepines and anticholinergic medications. Labs ordered, follow-up.      Vitals: temp 98.6 °F, /55, HR 85, RR 16, SpO2 95% on 2L NC  General: Somnolent.     HEENT: PERRLA. Vision grossly intact. Normal hearing. Oropharynx clear.  Neck: Supple. No JVD. Right IJ CVC.   CV: RRR. NL S1/S2. No BLE pitting edema. Left CT. Surgical dressing C/D/I.   Pulm: NL effort on 2L NC. CTAB.   GI: +BS x4. Soft. NT/ND.  : No CVA tenderness. Browning catheter with light yellow urine output.   Skin: Intact, warm and dry.  MSK: No gross joint deformities. Full ROM.   Neuro: A&Ox2, somnolent, CNs grossly intact, face symmetrical, no obvious droop, speech slowed, no pronator drift but bilateral weak arm and leg raise, no significant unilateral weakness. Remaining exam limit as patient did not participate.   Psych: Calm.     # Acute metabolic encephalopathy    Critical care time, excluding time doing procedures, was 45 minutes.    Jose Sharma MD  [facility] medicine - night team

## 2025-01-30 NOTE — CONSULTS
PICC Consult complete.  Indication for line: Downgrade Introducer, vesicants.  Education regarding PICC insertion discussed and risks and benefits assessed with patient and son.  Son verbalized understanding.  Consent signed by Son.  Time out done @ 13:49.   PICC inserted in [place] basilic vessel without difficulty per protocol.  Patient in Afib, unable to use G4 to confirm tip position.  STAT portable chest xray ordered.  Good blood return and flushes easily on all three ports.  Patient tolerated well.  Education pamphlets left in chart  Ultrasound photos of vein diameter and doppler signature placed in chart.   Please consult IV/PICC team if patient's needs change.      PICC OK to use, picc tip at CAJ per radiologist reading.

## 2025-01-30 NOTE — PROGRESS NOTES
Cardiology Progress Note     Admit Date:  1/27/2025    Consult reason/ Seen today for :       Subjective and  Overnight Events : Post CABG day 1 she went into A-fib earlier today      Chief complain on admission : 74 y.o.year old who is admitted forNo chief complaint on file.     Assessment / Plan:  ASCVD: S/p CABG continue supportive care  Care critical care  Atrial fibrillation can consider cardioversion consider starting Eliquis or anticoagulation if okay with critical care team and CT surgery discussed with CT surgery  Getting albumin due to low right atrial pressures  Continue amiodarone drip for now may need cardiovert?  DVT Prophylaxis if no contraindication  Discussed with primary team, hospitalist service, bedside nursing staff and family  Past medical history:    has a past medical history of Allergic rhinitis, Breast cancer (HCC), Depression, Family history of coronary artery disease, Fibroids, Frozen shoulder, GERD (gastroesophageal reflux disease), H/O cardiovascular stress test, H/O chest x-ray, H/O echocardiogram, History of exercise stress test, History of left heart catheterization (LHC), History of transesophageal echocardiography (BELLE), Hx of  Carotid Doppler ultrasound, Hyperlipidemia, Hypertension, Hypothyroidism, and VHD (valvular heart disease).  Past surgical history:   has a past surgical history that includes Mastectomy (11/2005); Hysterectomy (1984); other surgical history (12/12/08); Cholecystectomy (10/24/2007); Breast surgery (6/9/2006); Breast lumpectomy (, 1/18/02); Thyroidectomy; Cardiac procedure (N/A, 1/23/2025); and Coronary artery bypass graft (N/A, 1/28/2025).  Social History:   reports that she has never smoked. She has never used smokeless tobacco. She reports that she does not drink alcohol and does not use drugs.  Family history:  family history includes Heart Attack in her father, maternal

## 2025-01-30 NOTE — PROGRESS NOTES
Facility/Department: [facility] CVICU   CLINICAL BEDSIDE SWALLOW EVALUATION    NAME: Rachel Mcdaniel  : 1950  MRN: [id number]    ADMISSION DATE: 2025  ADMITTING DIAGNOSIS: has Hypertension; Mitral valve disease; Hyperlipidemia; Family history of coronary artery disease; GERD (gastroesophageal reflux disease); VHD (valvular heart disease); Abnormal EKG; Other chest pain; Abnormal nuclear stress test; Nonrheumatic aortic valve insufficiency; Coronary artery disease (CAD) excluded; Coronary atherosclerosis of native coronary artery; Aortic regurgitation; and Coronary artery disease involving native heart with unstable angina pectoris (HCC) on their problem list.  ONSET DATE: this admission     Recent Chest Xray/CT of Chest: Chest xray on 25 reveals the followin.  Mild worsening bibasilar opacity, right greater than left. Atelectasis is   favored  2.  No evidence for pneumothorax or large pleural effusion     Date of Eval: 2025  Evaluating Therapist: BUFFY Cardoza    Impression: Rachel Mcdaniel was seen today for a clinical bedside swallow evaluation following admission to [facility] with CAD. Pt underwent CABG 25. SLP consulted following stroke alert yesterday 25. Per internal medicine note 25 \"CTH and CTA H/N negative for acute changes or LVO, but showed moderate left ICA, mild right ICA, moderate bilateral V4, severe right M2 and severe left P2 stenosis.\" Relevant medical hx includes VHD- AI/MR, hypertension, hyperlipidemia, GERD, and breast cancer.     Rachel Mcdaniel was positioned upright in a chair with visitors at bedside. Pt pleasant and agreeable. Pt reports no difficulty swallowing. Pt's family member states she's \"having trouble coughing\" Pt appears fatigued. Pt with functional natural dentition, some missing on the bottom. Reports she wears partial bottom dentures. Oral mechanism examination reveals no gross facial asymmetry, adequate labial seal, and symmetrical velar

## 2025-01-30 NOTE — PROGRESS NOTES
0712: during shift handoff, pt states \"I feel nauseous and light headed.\" Pt's 's, A-fib RVR, on monitor. YOBANI Cao retrieving EKG machine at this time.

## 2025-01-30 NOTE — CARE COORDINATION
Chart reviewed. Spoke with pt, and family in room. POA was not present, getting lunch. Pt is from home, lives with son and DIL. ROBERTO CARLOS present. Pt was independent PTA. PT/OT rec ARU, however pt is extremely weak at this time. Discussed that in current condition the pt may not be able to do 3 hrs of physical therapy a day, however after a few days of recover she may get better. Pt is agreeable for ARU to review, however does not want to make final decision until she can discuss with her son and see how she progresses. Pt states she lives in [place] and already spoke with/looked into [facility] - hospital based OP rehab. Only SNF in [place] is [facility] and [facility].     Discharge plan uncertain at this time, pending pt's progress and ability to work with physical therapy. Pt is agreeable to ARU following, however wants to wait to make final decision until better improved and more energy and talks with her son/POA.    01/30/25 0059   Service Assessment   Patient Orientation Alert and Oriented   Cognition Alert   History Provided By Patient;Child/Family;Medical Record   Primary Caregiver Self   Accompanied By/Relationship ROBERTO CARLOS, CIERA, and brother. POA/Son was in the cafeteria at time of assessment   Support Systems Children;Family Members   Patient's Healthcare Decision Maker is: Named in Scanned ACP Document   PCP Verified by CM Yes   Prior Functional Level Independent in ADLs/IADLs   Current Functional Level Assistance with the following:   Can patient return to prior living arrangement Unknown at present   Ability to make needs known: Fair   Family able to assist with home care needs: Yes   Would you like for me to discuss the discharge plan with any other family members/significant others, and if so, who? Yes  (Son/JOSE DAVID Miller)   Financial Resources Medicare   Community Resources None   CM/SW Referral Other (see comment)  (Discharge planning)   Social/Functional History   Lives With Son;Family  (ROBERTO CARLOS,

## 2025-01-31 ENCOUNTER — APPOINTMENT (OUTPATIENT)
Dept: GENERAL RADIOLOGY | Age: 75
DRG: 236 | End: 2025-01-31
Attending: THORACIC SURGERY (CARDIOTHORACIC VASCULAR SURGERY)
Payer: MEDICARE

## 2025-01-31 LAB
ABO/RH: NORMAL
ACTIVATED CLOTTING TIME, HIGH RANGE: 110 SEC (ref 81–125)
ACTIVATED CLOTTING TIME, HIGH RANGE: 112 SEC (ref 81–125)
ACTIVATED CLOTTING TIME, HIGH RANGE: 491 SEC (ref 81–125)
ACTIVATED CLOTTING TIME, HIGH RANGE: 504 SEC (ref 81–125)
ACTIVATED CLOTTING TIME, HIGH RANGE: 540 SEC (ref 81–125)
ANION GAP SERPL CALCULATED.3IONS-SCNC: 7 MMOL/L (ref 9–17)
ANTIBODY SCREEN: NEGATIVE
BLOOD BANK DISPENSE STATUS: NORMAL
BLOOD BANK SAMPLE EXPIRATION: NORMAL
BPU ID: NORMAL
BUN SERPL-MCNC: 31 MG/DL (ref 7–20)
CA-I BLD-SCNC: 1.14 MMOL/L (ref 1.15–1.33)
CA-I BLD-SCNC: 1.2 MMOL/L (ref 1.15–1.33)
CALCIUM SERPL-MCNC: 7.2 MG/DL (ref 8.3–10.6)
CHLORIDE BLD-SCNC: 121 MMOL/L (ref 99–110)
CHLORIDE SERPL-SCNC: 108 MMOL/L (ref 99–110)
CO2 SERPL-SCNC: 21 MMOL/L (ref 21–32)
COMPONENT: NORMAL
CREAT BLD-MCNC: 0.7 MG/DL (ref 0.5–1.2)
CREAT SERPL-MCNC: 0.6 MG/DL (ref 0.6–1.2)
CROSSMATCH RESULT: NORMAL
EGFR, POC: >90 ML/MIN/1.73M2
ERYTHROCYTE [DISTWIDTH] IN BLOOD BY AUTOMATED COUNT: 14.2 % (ref 11.7–14.9)
GFR, ESTIMATED: >90 ML/MIN/1.73M2
GLUCOSE BLD-MCNC: 116 MG/DL (ref 74–99)
GLUCOSE BLD-MCNC: 127 MG/DL (ref 74–99)
GLUCOSE BLD-MCNC: 130 MG/DL (ref 74–99)
GLUCOSE BLD-MCNC: 131 MG/DL (ref 74–99)
GLUCOSE BLD-MCNC: 152 MG/DL (ref 74–99)
GLUCOSE SERPL-MCNC: 166 MG/DL (ref 74–99)
HCT VFR BLD AUTO: 25.8 % (ref 37–47)
HGB BLD-MCNC: 8 G/DL (ref 12.5–16)
MAGNESIUM SERPL-MCNC: 2.2 MG/DL (ref 1.8–2.4)
MCH RBC QN AUTO: 29.5 PG (ref 27–31)
MCHC RBC AUTO-ENTMCNC: 31 G/DL (ref 32–36)
MCV RBC AUTO: 95.2 FL (ref 78–100)
PHOSPHATE SERPL-MCNC: 2 MG/DL (ref 2.5–4.9)
PLATELET # BLD AUTO: 177 K/UL (ref 140–440)
PMV BLD AUTO: 10.9 FL (ref 7.5–11.1)
POC ANION GAP: 7 MMOL/L (ref 7–16)
POTASSIUM BLD-SCNC: 4.6 MMOL/L (ref 3.5–5.1)
POTASSIUM SERPL-SCNC: 3.9 MMOL/L (ref 3.5–5.1)
RBC # BLD AUTO: 2.71 M/UL (ref 4.2–5.4)
SODIUM BLD-SCNC: 152 MMOL/L (ref 136–145)
SODIUM SERPL-SCNC: 137 MMOL/L (ref 136–145)
TRANSFUSION STATUS: NORMAL
UNIT DIVISION: 0
WBC OTHER # BLD: 19 K/UL (ref 4–10.5)

## 2025-01-31 PROCEDURE — 2100000000 HC CCU R&B

## 2025-01-31 PROCEDURE — 82330 ASSAY OF CALCIUM: CPT

## 2025-01-31 PROCEDURE — 6360000002 HC RX W HCPCS: Performed by: PHYSICIAN ASSISTANT

## 2025-01-31 PROCEDURE — 71045 X-RAY EXAM CHEST 1 VIEW: CPT

## 2025-01-31 PROCEDURE — 99233 SBSQ HOSP IP/OBS HIGH 50: CPT | Performed by: INTERNAL MEDICINE

## 2025-01-31 PROCEDURE — 2580000003 HC RX 258: Performed by: PHYSICIAN ASSISTANT

## 2025-01-31 PROCEDURE — 6370000000 HC RX 637 (ALT 250 FOR IP): Performed by: PHYSICIAN ASSISTANT

## 2025-01-31 PROCEDURE — 2500000003 HC RX 250 WO HCPCS: Performed by: THORACIC SURGERY (CARDIOTHORACIC VASCULAR SURGERY)

## 2025-01-31 PROCEDURE — 82962 GLUCOSE BLOOD TEST: CPT

## 2025-01-31 PROCEDURE — 2500000003 HC RX 250 WO HCPCS: Performed by: PHYSICIAN ASSISTANT

## 2025-01-31 PROCEDURE — 80048 BASIC METABOLIC PNL TOTAL CA: CPT

## 2025-01-31 PROCEDURE — 6370000000 HC RX 637 (ALT 250 FOR IP): Performed by: INTERNAL MEDICINE

## 2025-01-31 PROCEDURE — 97530 THERAPEUTIC ACTIVITIES: CPT

## 2025-01-31 PROCEDURE — P9045 ALBUMIN (HUMAN), 5%, 250 ML: HCPCS | Performed by: PHYSICIAN ASSISTANT

## 2025-01-31 PROCEDURE — 83735 ASSAY OF MAGNESIUM: CPT

## 2025-01-31 PROCEDURE — 6360000002 HC RX W HCPCS: Performed by: THORACIC SURGERY (CARDIOTHORACIC VASCULAR SURGERY)

## 2025-01-31 PROCEDURE — 85027 COMPLETE CBC AUTOMATED: CPT

## 2025-01-31 PROCEDURE — 84100 ASSAY OF PHOSPHORUS: CPT

## 2025-01-31 PROCEDURE — 2060000000 HC ICU INTERMEDIATE R&B

## 2025-01-31 PROCEDURE — 97116 GAIT TRAINING THERAPY: CPT

## 2025-01-31 RX ORDER — METOPROLOL TARTRATE 25 MG/1
37.5 TABLET, FILM COATED ORAL 2 TIMES DAILY
Status: DISCONTINUED | OUTPATIENT
Start: 2025-01-31 | End: 2025-02-03

## 2025-01-31 RX ORDER — AMLODIPINE BESYLATE 5 MG/1
2.5 TABLET ORAL DAILY
Status: DISCONTINUED | OUTPATIENT
Start: 2025-02-01 | End: 2025-02-05 | Stop reason: HOSPADM

## 2025-01-31 RX ORDER — METOPROLOL TARTRATE 50 MG
50 TABLET ORAL 2 TIMES DAILY
Status: DISCONTINUED | OUTPATIENT
Start: 2025-01-31 | End: 2025-01-31

## 2025-01-31 RX ORDER — AMLODIPINE BESYLATE 5 MG/1
5 TABLET ORAL DAILY
Status: DISCONTINUED | OUTPATIENT
Start: 2025-02-01 | End: 2025-01-31

## 2025-01-31 RX ORDER — AMLODIPINE BESYLATE 5 MG/1
2.5 TABLET ORAL ONCE
Status: DISCONTINUED | OUTPATIENT
Start: 2025-01-31 | End: 2025-01-31

## 2025-01-31 RX ORDER — METOPROLOL TARTRATE 25 MG/1
25 TABLET, FILM COATED ORAL ONCE
Status: DISCONTINUED | OUTPATIENT
Start: 2025-01-31 | End: 2025-01-31

## 2025-01-31 RX ORDER — METOPROLOL TARTRATE 25 MG/1
12.5 TABLET, FILM COATED ORAL ONCE
Status: COMPLETED | OUTPATIENT
Start: 2025-01-31 | End: 2025-01-31

## 2025-01-31 RX ADMIN — ACETAMINOPHEN 650 MG: 325 TABLET ORAL at 19:58

## 2025-01-31 RX ADMIN — POTASSIUM CHLORIDE 20 MEQ: 1500 TABLET, EXTENDED RELEASE ORAL at 08:22

## 2025-01-31 RX ADMIN — SODIUM CHLORIDE, PRESERVATIVE FREE 10 ML: 5 INJECTION INTRAVENOUS at 08:24

## 2025-01-31 RX ADMIN — ATORVASTATIN CALCIUM 40 MG: 40 TABLET, FILM COATED ORAL at 21:56

## 2025-01-31 RX ADMIN — AMIODARONE HYDROCHLORIDE 200 MG: 200 TABLET ORAL at 08:22

## 2025-01-31 RX ADMIN — GABAPENTIN 300 MG: 300 CAPSULE ORAL at 13:21

## 2025-01-31 RX ADMIN — CLOPIDOGREL BISULFATE 75 MG: 75 TABLET ORAL at 08:23

## 2025-01-31 RX ADMIN — POTASSIUM CHLORIDE 40 MEQ: 1500 TABLET, EXTENDED RELEASE ORAL at 06:26

## 2025-01-31 RX ADMIN — GUAIFENESIN 1200 MG: 600 TABLET ORAL at 21:56

## 2025-01-31 RX ADMIN — ACETAMINOPHEN 650 MG: 325 TABLET ORAL at 08:22

## 2025-01-31 RX ADMIN — AMIODARONE HYDROCHLORIDE 0.5 MG/MIN: 50 INJECTION, SOLUTION INTRAVENOUS at 06:41

## 2025-01-31 RX ADMIN — ACETAMINOPHEN 650 MG: 325 TABLET ORAL at 12:23

## 2025-01-31 RX ADMIN — ASPIRIN 81 MG CHEWABLE TABLET 81 MG: 81 TABLET CHEWABLE at 08:22

## 2025-01-31 RX ADMIN — TRAMADOL HYDROCHLORIDE 100 MG: 50 TABLET, COATED ORAL at 19:57

## 2025-01-31 RX ADMIN — BACITRACIN: 500 OINTMENT TOPICAL at 08:25

## 2025-01-31 RX ADMIN — SODIUM PHOSPHATE, MONOBASIC, MONOHYDRATE AND SODIUM PHOSPHATE, DIBASIC, ANHYDROUS 15 MMOL: 142; 276 INJECTION, SOLUTION INTRAVENOUS at 11:25

## 2025-01-31 RX ADMIN — LEVOTHYROXINE SODIUM 150 MCG: 0.07 TABLET ORAL at 06:26

## 2025-01-31 RX ADMIN — TRAMADOL HYDROCHLORIDE 50 MG: 50 TABLET, COATED ORAL at 12:24

## 2025-01-31 RX ADMIN — CHLORHEXIDINE GLUCONATE 0.12% ORAL RINSE 15 ML: 1.2 LIQUID ORAL at 08:25

## 2025-01-31 RX ADMIN — SODIUM CHLORIDE, PRESERVATIVE FREE 10 ML: 5 INJECTION INTRAVENOUS at 22:19

## 2025-01-31 RX ADMIN — CHLORHEXIDINE GLUCONATE 0.12% ORAL RINSE 15 ML: 1.2 LIQUID ORAL at 22:12

## 2025-01-31 RX ADMIN — METOPROLOL TARTRATE 25 MG: 25 TABLET, FILM COATED ORAL at 08:23

## 2025-01-31 RX ADMIN — INSULIN LISPRO 1 UNITS: 100 INJECTION, SOLUTION INTRAVENOUS; SUBCUTANEOUS at 08:14

## 2025-01-31 RX ADMIN — ALBUMIN (HUMAN) 12.5 G: 12.5 INJECTION, SOLUTION INTRAVENOUS at 01:24

## 2025-01-31 RX ADMIN — GABAPENTIN 300 MG: 300 CAPSULE ORAL at 21:56

## 2025-01-31 RX ADMIN — WATER 1000 MG: 1 INJECTION INTRAMUSCULAR; INTRAVENOUS; SUBCUTANEOUS at 06:56

## 2025-01-31 RX ADMIN — FUROSEMIDE 40 MG: 10 INJECTION, SOLUTION INTRAMUSCULAR; INTRAVENOUS at 16:52

## 2025-01-31 RX ADMIN — FUROSEMIDE 40 MG: 10 INJECTION, SOLUTION INTRAMUSCULAR; INTRAVENOUS at 08:23

## 2025-01-31 RX ADMIN — ENOXAPARIN SODIUM 40 MG: 100 INJECTION SUBCUTANEOUS at 08:23

## 2025-01-31 RX ADMIN — AMLODIPINE BESYLATE 2.5 MG: 5 TABLET ORAL at 08:23

## 2025-01-31 RX ADMIN — FAMOTIDINE 20 MG: 20 TABLET, FILM COATED ORAL at 21:56

## 2025-01-31 RX ADMIN — MUPIROCIN: 20 OINTMENT TOPICAL at 22:12

## 2025-01-31 RX ADMIN — MUPIROCIN: 20 OINTMENT TOPICAL at 08:25

## 2025-01-31 RX ADMIN — GABAPENTIN 300 MG: 300 CAPSULE ORAL at 08:23

## 2025-01-31 RX ADMIN — ONDANSETRON 4 MG: 2 INJECTION INTRAMUSCULAR; INTRAVENOUS at 12:53

## 2025-01-31 RX ADMIN — CALCIUM CHLORIDE 1000 MG: 100 INJECTION, SOLUTION INTRAVENOUS at 11:23

## 2025-01-31 RX ADMIN — POTASSIUM CHLORIDE 20 MEQ: 1500 TABLET, EXTENDED RELEASE ORAL at 16:52

## 2025-01-31 RX ADMIN — METOPROLOL TARTRATE 12.5 MG: 25 TABLET, FILM COATED ORAL at 11:38

## 2025-01-31 RX ADMIN — METOPROLOL TARTRATE 37.5 MG: 25 TABLET, FILM COATED ORAL at 21:56

## 2025-01-31 ASSESSMENT — PAIN DESCRIPTION - DESCRIPTORS
DESCRIPTORS: ACHING;DISCOMFORT
DESCRIPTORS: ACHING;SHARP;THROBBING
DESCRIPTORS: THROBBING

## 2025-01-31 ASSESSMENT — PAIN DESCRIPTION - LOCATION
LOCATION: FLANK
LOCATION: CHEST
LOCATION: FLANK

## 2025-01-31 ASSESSMENT — PAIN SCALES - GENERAL
PAINLEVEL_OUTOF10: 4
PAINLEVEL_OUTOF10: 3
PAINLEVEL_OUTOF10: 0
PAINLEVEL_OUTOF10: 8
PAINLEVEL_OUTOF10: 0
PAINLEVEL_OUTOF10: 7

## 2025-01-31 ASSESSMENT — PAIN DESCRIPTION - FREQUENCY
FREQUENCY: INTERMITTENT
FREQUENCY: INTERMITTENT

## 2025-01-31 ASSESSMENT — PAIN DESCRIPTION - PAIN TYPE
TYPE: SURGICAL PAIN
TYPE: SURGICAL PAIN

## 2025-01-31 ASSESSMENT — PAIN DESCRIPTION - ORIENTATION
ORIENTATION: RIGHT
ORIENTATION: RIGHT
ORIENTATION: LEFT

## 2025-01-31 ASSESSMENT — PAIN - FUNCTIONAL ASSESSMENT
PAIN_FUNCTIONAL_ASSESSMENT: ACTIVITIES ARE NOT PREVENTED

## 2025-01-31 ASSESSMENT — PAIN DESCRIPTION - ONSET
ONSET: ON-GOING
ONSET: PROGRESSIVE

## 2025-01-31 NOTE — PROGRESS NOTES
1905: pt educated on appropriate breathing exercises for cordis removal; pt demonstrated understanding. Site cleansed with CHG; sutures removed intact. Introducer removed at this time. Joseph pressure held for 5 minutes; dry gauze secured by tegaderm. No complications, pt tolerated well; HR 75 bpm NSR with no ectopy.

## 2025-01-31 NOTE — PROGRESS NOTES
0800: Dr. Byrd, JOSH Mayorga, and JOSH Jimenes at bedside at this time; updated on pt's hemodynamic status. Received verbal order with readback for pt to be NPO except with medication administration, consult Speech STAT, consult Vascular Access for PICC line placement, and give 12.5 mg of metoprolol per Dr. Byrd. New order to follow for lasix and potassium chloride.

## 2025-01-31 NOTE — PROGRESS NOTES
Cardiology Progress Note     Admit Date:  1/27/2025    Consult reason/ Seen today for :       Subjective and  Overnight Events : She is back in sinus rhythm now on amiodarone therapy   Still having some surgical pain  post CABG she went into A-fib , but improved on amiodarone      Chief complain on admission : 74 y.o.year old who is admitted forNo chief complaint on file.     Assessment / Plan:  ASCVD: S/p CABG continue supportive care  Care critical care chest tube management as per CT  Atrial fibrillation c, in sinus now not on anticoagulation discussed with CT surgery on amiodarone  Start diuresis agree with CT surgery  DVT Prophylaxis if no contraindication  Discussed with primary team, hospitalist service, bedside nursing staff and family  Past medical history:    has a past medical history of Allergic rhinitis, Breast cancer (HCC), Depression, Family history of coronary artery disease, Fibroids, Frozen shoulder, GERD (gastroesophageal reflux disease), H/O cardiovascular stress test, H/O chest x-ray, H/O echocardiogram, History of exercise stress test, History of left heart catheterization (LHC), History of transesophageal echocardiography (BELLE), Hx of  Carotid Doppler ultrasound, Hyperlipidemia, Hypertension, Hypothyroidism, and VHD (valvular heart disease).  Past surgical history:   has a past surgical history that includes Mastectomy (11/2005); Hysterectomy (1984); other surgical history (12/12/08); Cholecystectomy (10/24/2007); Breast surgery (6/9/2006); Breast lumpectomy (, 1/18/02); Thyroidectomy; Cardiac procedure (N/A, 1/23/2025); and Coronary artery bypass graft (N/A, 1/28/2025).  Social History:   reports that she has never smoked. She has never used smokeless tobacco. She reports that she does not drink alcohol and does not use drugs.  Family history:  family history includes Heart Attack in her father, maternal

## 2025-01-31 NOTE — PROGRESS NOTES
0900: received verbal order with readback for 150 mg of amiodarone in dextrose 5% 100 mL bolus administered at 600 mL/hr over 10 minutes per Dr. Byrd.

## 2025-01-31 NOTE — PROGRESS NOTES
1327: received verbal order with readback for 150 mg of amiodarone in dextrose 5% 100 mL bolus administered at 600 mL/hr over 10 minutes per Dr. Byrd.

## 2025-01-31 NOTE — PROGRESS NOTES
1420: pt has had 20 mL/hr of UO x2 hours; CVP 15. Received verbal order with readback for 12.5 g of albumin 25% per JOSH Mayorga.

## 2025-01-31 NOTE — PROGRESS NOTES
Physical Therapy    Physical Therapy Treatment Note  Name: Rachel cMdaniel MRN: [id number] :   1950   Date:  2025   Admission Date: 2025 Room:  A   Restrictions/Precautions:           general precautions, falls, sternal precautions, chest tubes x2, bundy, pacer wires     Communication with other providers:  Hand off with Nurse, Co-Tx with BROWNE for increased functional mobility.     Subjective:  Patient states: Pt. Agreeable to work with therapy.    Pain:  (0/10 to 10/10):  Denies pain   Objective:    Observation: Pt. Up to recliner upon arrival to room     Treatment, including education/measures:  Therapeutic Activity Training:   Therapeutic activity training was instructed today.  Cues were given for safety, sequence, UE/LE placement, awareness, and balance.    Activities performed today included sit-stand, standing balance.    Sit to stand transfer: Jordyn x2    Sitting balance:CGA-SBA unsupported in recliner  Standing balance:Jordyn x2 with CW. Pt. Requires multiple standing rest breaks and verbal cues for posture.     Gait Training:  Cues were given for safety, sequence, device management, balance, posture, awareness, path.    Gait training for a total of 200ft with 5 standing rest breaks,  MinAx2, CW. Pt. Requires frequent cues for posture, kendra, and walker management.     Education:   Pt. Educated on importance of out of bed activity, safe functional mobility, and walker management.     Assessment / Impression:    Pt. Left up in recliner at end of session, all needs met, phone and call light in reach, bed/personal alarm active, and nursing updated.     Patient's tolerance of treatment:  Good   Adverse Reaction: none  Significant change in status and impact:  none  Barriers to improvement:  none  Plan for Next Session:    Cont per POC and progress as able     Timed Code Treatment Minutes: 38 Minutes  PT Individual Minutes  Time In: 0950  Time Out: 1028  Minutes: 38

## 2025-01-31 NOTE — PROGRESS NOTES
1945: JOSH Mayorga updated on pt's hemodynamic status; HR 76, /84 (104), CVP 13, UO 1340. Received telephone order with readback to modify metoprolol to 25 mg bid with the first dose to be administered now per JOSH Mayorga.    Received telephone order for one time PRN dose for 10 mg of hydralazine IVP IF at 2200, SBP is > 150 mmHg per JOSH Mayorga. See order administration instructions: \"If SBP remains greater than 150 mmHg, call JOSH Mayorga.\"

## 2025-01-31 NOTE — PROGRESS NOTES
1130: received verbal order with readback for the lidocaine 4% external patches (2) per JOSH Mayorga.

## 2025-01-31 NOTE — PROGRESS NOTES
1215: pt has c/o nausea and isn't wanting to eat much; received verbal order with readback for adult oral nutrition supplement, breakfast, am snack, lunch, pm snack, dinner, standard high calorie high protein oral supplement per JOSH Mayorga.

## 2025-01-31 NOTE — PROGRESS NOTES
[facility] Cardiothoracic Surgery  Daily Progress Note    Surgeon:  Dr. Byrd       Subjective:  Ms. Mcdaniel is a 74 y.o. year-old female status post CORONARY ARTERY BYPASS GRAFT X; LEFT RADIAL ARTERY-LAD; SVG-DIAG; SVG-OM1; LEFT ENDOSCOPIC GREATER SAPHENOUS VEIN HARVEST; LEFT ENDOSCOPIC RADIAL ARETY HARVEST; LEFT ATRIAL APPENDAGE LIGATION WITH A 45MM ATRICLIP; BELLE;  on 1/28/25.      Patient was seen and examined at bedside this morning without any complaints.  Patient went into atrial fibrillation on 1/30 at 8 AM.  Converted to normal sinus rhythm on 1/31 10:30 AM.    Gtts: Amiodarone @ 0.5    Hospital Course:  1/27/25: Patient was brought to the hospital for BELLE.  Direct admitted by Dr. Byrd to preop for surgical intervention.  Preoperative testing complete.  1/28/2025: Patient underwent surgical intervention.  Tolerated the procedure well. Transferred to the CVICU in a stable condition.   1/29/25: Gtts: Cardene at 2.5.  Start Norvasc 2.5 mg daily then discontinue the Cardene 4 hours after.  On 4 L nasal cannula of oxygen.  Wean as tolerated.  Arterial line removed this morning.  Maintain bedrest for 4 hours then okay to get up and work with physical therapy.  Remove [place].  Possibly remove hard mediastinal chest tube after patient walks today.  Maintain transcutaneous pacing wires.  Small mediastinal chest tube fell out in the evening.  Stroke alert was called on the patient due to lethargy and right-sided weakness.  All testing was negative.  1/30/25: Patient went into atrial fibrillation this morning.  Received 2 amiodarone boluses per Dr. Byrd and is on amiodarone drip at 1.  Cordis in place.  Dr. Byrd would like a PICC line and remove Cordis.  Started on Rocephin for 7 days due to increased white blood cell count and possible pneumonia per Dr. Byrd.  Speech therapy evaluated the patient for possible aspiration which was negative.   sheath. Stable cardiomegaly.   Bilateral chest tubes and a right PICC line are in place. Sternal wires,   mediastinal clips and a left atrial appendage clip. Similar appearance of mild   congestive changes, small bilateral pleural effusions, and mild bibasilar   airspace disease versus atelectasis. No pneumothorax.           IMPRESSION:   Interval removal of the right internal jugular sheath. Otherwise, no significant   change.                 Dictated and Electronically Signed By:   Eric Whaley MD   1/31/2025 6:31       Scheduled Meds:    metoprolol tartrate  12.5 mg Oral Once    [START ON 2/1/2025] amLODIPine  2.5 mg Oral Daily    metoprolol tartrate  37.5 mg Oral BID    cefTRIAXone (ROCEPHIN) IV  1,000 mg IntraVENous Q24H    furosemide  40 mg IntraVENous BID    potassium chloride  20 mEq Oral BID WC    lidocaine  2 patch TransDERmal Daily    sodium chloride flush  5-40 mL IntraVENous 2 times per day    levothyroxine  150 mcg Oral Daily    latanoprost  1 drop Both Eyes Nightly    insulin lispro  0-8 Units SubCUTAneous 4x Daily AC & HS    sodium chloride flush  5-40 mL IntraVENous 2 times per day    enoxaparin  40 mg SubCUTAneous Daily    aspirin  81 mg Oral Daily    clopidogrel  75 mg Oral Daily    amiodarone  200 mg Oral BID    chlorhexidine  15 mL Mouth/Throat BID    mupirocin   Each Nostril BID    multivitamin  1 tablet Oral Daily with breakfast    polyethylene glycol  17 g Oral Daily    sennosides-docusate sodium  1 tablet Oral BID    atorvastatin  40 mg Oral Nightly    famotidine  20 mg Oral QHS    Or    famotidine (PEPCID) injection  20 mg IntraVENous QHS    bacitracin   Topical BID    gabapentin  300 mg Oral TID    acetaminophen  650 mg Oral Q4H    guaiFENesin  1,200 mg Oral BID     Continuous Infusions:    amiodarone 0.5 mg/min (01/31/25 0641)    sodium chloride      sodium chloride      norepinephrine Stopped (01/29/25 0029)    niCARdipine 2.5 mg/hr (01/29/25 0700)    dextrose             Physical

## 2025-01-31 NOTE — PROGRESS NOTES
Critical care progress note    NAME: Rachel Mcdaniel  : 1950  MRN: [id number]      Assessment/Plan:  Rachel Mcdaniel is a 74 y.o. 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 hypertension and hyperlipidemia who presented to [facility] 2025 for left heart cath  POD#0 following CABGx3,   critical care consultation re: mechanical ventilation, insulin infusion, anticipated extubation within 6hrs  Insulin infusion transition to sliding scale overage + long acting insulin    Problem list  POD#3 CORONARY ARTERY BYPASS GRAFT X; LEFT RADIAL ARTERY-LAD; SVG-DIAG; SVG-OM1; LEFT ENDOSCOPIC GREATER SAPHENOUS VEIN HARVEST; LEFT ENDOSCOPIC RADIAL ARTERY HARVEST; LEFT ATRIAL APPENDAGE LIGATION WITH A 45MM ATRICLIP; BELLE;   extubated      Neuro: extubated yesterday without complications  Cardio:  cxt removal  Resp: deep breath, cough, acapella  GI: diet advance medications as needed  RENAL: elyte corection prn; bundy cath for strict I/O  Heme:  repeat labs pending; minimal drainage from all chest tubes- stripping for avoidance of clot/missed volume  ID: preop abx admin per surgical plan  Endo: transition to sliding scale for glycemic control a planned 1400  MSK: left femoral arterial line,left endoscopic radial artery harvest    Tubes/Lines/Drains:  CXT remains in place: serous output    Code Status: FULL    Thank you for allowing us to participate in the care of your patient. For any questions, please call.       Chief Complaint / Reason for Consult:  Mechanical ventilation  Insulin infusion transition  CABGx3    History of Present Illness:  BELLE was reviwed: mild AI and mild to moderate MR.  LV function is good.    Left Ventricle: Normal left ventricular systolic function.    Interatrial Septum: Agitated saline study was negative without provocation.    Aortic Valve: Mild regurgitation.    Mitral Valve: Mild to moderate regurgitation.     Pericardium: No pericardial effusion.   coronary angiography 1/23/2025  Left main has a 50% ostial lesion  The left descending artery is a large-caliber vessel with 70% bifurcation lesion  The diagonal has 80%  stenosis segment   Circumflex vessel vessel is a nondominant vessel and the PL branch, has a 99% stenosis also has ostial 50% stenosis as well    right coronary artery is a dominant vessel in its PL is about 50 stenosis  LV gram shows normal EF- 60%    Severe multivessel coronary artery disease -surgical coronary revascularization and atriclip procedure     CORONARY ARTERY BYPASS GRAFT X; LEFT RADIAL ARTERY-LAD; SVG-DIAG; SVG-OM1; LEFT ENDOSCOPIC GREATER SAPHENOUS VEIN HARVEST; LEFT ENDOSCOPIC RADIAL ARETY HARVEST; LEFT ATRIAL APPENDAGE LIGATION WITH A 45MM ATRICLIP; BELLE;     The patient had a previous left-sided TRAM flap and radiation therapy to her chest. The previous surgeon in 2007 had sewn the TRAM flap as a free flap and deflected into the mammary artery and vein on the left side. A small atrophic distal mammary artery; at th anastomosis area where the artery to the pedicle was damage. The mammary artery was very nice and had a good pulse and was large from then on but it was way too short to be used for a either diagonal or left anterior descending graft. Right internal mammary artery with dense adhesions from the previous radiation. Therefore the left radial artery was harvested-great flow and a good size. At the end of the procedure she required no inotropic support and was doing well. The conduit harvest took a lot of time and we went on pump at 12:30 PM.     Cardiopulmonary bypass time 97 minutes, aortic cross-clamp time 80 minutes, intraoperative transfusions: None, Cell Saver given to 50, urine output 550.  Implantable devices:  Atrial clip model number ACH V45, lot #[id number]  ROS:  Limited- immediate post op/intubated  Review of Systems     Past Medical, Surgical, Social, Family History:   Past

## 2025-01-31 NOTE — PROGRESS NOTES
V2.0  [facility] Hospitalist Progress Note      Name:  Rachel Mcdaniel /Age/Sex: 1950  (74 y.o. female)   MRN & CSN:  [id number] & [id number] Encounter Date/Time: 2025 11:41 AM EST    Location:  -A PCP: Arnoldo Wheeler MD       Hospital Day: 5      Subjective:     Chief Complaint:  s/p CABG      Patient seen and examined at bedside. Doing well no new complaints or concerns.          Assessment and Plan:   CAD. S/p LHC 2025 with triple vessel disease  S/p CABG 2025  - on ASA, plavix and statin  - ctx managing  - chest tubes per ctx  - on amio drip     Essential HTN.  On home lopresor 25 mg BID and amlodipine 5 mg  - resumed     Hypothyroidism. On home synthroid 150 mcg. Last TSH 0.03, free T4 2.4 2025.   TSH 0.02 2024  - decrease Synthroid 125 mcg   - check TSH, free T4 in 3-4 weeks      Personally reviewed Lab Studies and Imaging     Drugs that require monitoring for toxicity include lovenox and the method of monitoring was cbc      Diet ADULT DIET; Dysphagia - Soft and Bite Sized; 4 carb choices (60 gm/meal); Low Fat/Low Chol/High Fiber/2 gm Na  ADULT ORAL NUTRITION SUPPLEMENT; Breakfast, AM Snack, Lunch, PM Snack, Dinner; Standard High Calorie/High Protein Oral Supplement   DVT Prophylaxis [x] Lovenox, []  Heparin, [] SCDs, [] Ambulation,  [] Eliquis, [] Xarelto  [] Coumadin   Code Status Full Code   Disposition Per primary   Surrogate Decision Maker/ POA      Review of Systems:    Review of Systems    See above    Objective:     Intake/Output Summary (Last 24 hours) at 2025 1141  Last data filed at 2025 0800  Gross per 24 hour   Intake 1168.75 ml   Output 1354 ml   Net -185.25 ml        Vitals:   Vitals:    25 1138   BP: 114/65   Pulse: 72   Resp:    Temp:    SpO2:        Physical Exam:     General: NAD  Eyes: EOMI  ENT: neck supple  Cardiovascular: Regular rate.  Respiratory: Clear to auscultation  Gastrointestinal: Soft, non tender  Genitourinary: no

## 2025-01-31 NOTE — PROGRESS NOTES
1420: received verbal order with readback for STAT CXR to verify PICC line placement per Dr. Byrd.

## 2025-01-31 NOTE — PLAN OF CARE
Problem: ABCDS Injury Assessment  Goal: Absence of physical injury  1/31/2025 0128 by Kiley Maddox RN  Outcome: Progressing  1/30/2025 1137 by Ebony Richardson RN  Outcome: Progressing     Problem: Safety - Adult  Goal: Free from fall injury  1/31/2025 0128 by Kiley Maddox RN  Outcome: Progressing  1/30/2025 1137 by Ebony Richardson RN  Outcome: Progressing     Problem: Discharge Planning  Goal: Discharge to home or other facility with appropriate resources  1/31/2025 0128 by Kiley Maddox RN  Outcome: Progressing  Flowsheets (Taken 1/30/2025 1500 by Ebony Richardson, RN)  Discharge to home or other facility with appropriate resources: Identify barriers to discharge with patient and caregiver  1/30/2025 1137 by Ebony Richardson RN  Outcome: Progressing  Flowsheets  Taken 1/30/2025 1100  Discharge to home or other facility with appropriate resources: Identify barriers to discharge with patient and caregiver  Taken 1/30/2025 0800  Discharge to home or other facility with appropriate resources: Identify barriers to discharge with patient and caregiver     Problem: Pain  Goal: Verbalizes/displays adequate comfort level or baseline comfort level  1/31/2025 0128 by Kiley Maddox RN  Outcome: Progressing  Flowsheets (Taken 1/30/2025 1500 by Ebony Richardson, RN)  Verbalizes/displays adequate comfort level or baseline comfort level: Assess pain using appropriate pain scale  1/30/2025 1137 by Ebony Richardson RN  Outcome: Progressing  Flowsheets  Taken 1/30/2025 1100  Verbalizes/displays adequate comfort level or baseline comfort level: Assess pain using appropriate pain scale  Taken 1/30/2025 0800  Verbalizes/displays adequate comfort level or baseline comfort level: Assess pain using appropriate pain scale     Problem: Skin/Tissue Integrity  Goal: Skin integrity remains intact  Description: 1.  Monitor for areas of redness and/or skin breakdown  2.  Assess vascular access sites hourly  3.  Every 4-6 hours minimum:  Change oxygen  saturation probe site  4.  Every 4-6 hours:  If on nasal continuous positive airway pressure, respiratory therapy assess nares and determine need for appliance change or resting period  1/31/2025 0128 by Kiley Maddox RN  Outcome: Progressing  Flowsheets  Taken 1/30/2025 2000 by Kiley Maddox RN  Skin Integrity Remains Intact: Monitor for areas of redness and/or skin breakdown  Taken 1/30/2025 1500 by Ebony Richardson RN  Skin Integrity Remains Intact: Monitor for areas of redness and/or skin breakdown  1/30/2025 1137 by Ebony Richardson RN  Outcome: Progressing  Flowsheets  Taken 1/30/2025 1100  Skin Integrity Remains Intact: Monitor for areas of redness and/or skin breakdown  Taken 1/30/2025 0800  Skin Integrity Remains Intact: Monitor for areas of redness and/or skin breakdown

## 2025-01-31 NOTE — PROGRESS NOTES
Occupational Therapy     Occupational Therapy Treatment Note     Name: Rachel Mcdaniel MRN: [id number] :             1950   Date:  2025   Admission Date: 2025 Room:  A      Restrictions/Precautions:  General Precautions, High Fall Risk, Sternal Precautions, Chest Tube (x2), [place], Telemetry, Pulse Ox, BP cuff, 3L o2, Bed/chair alarm      Communication with other providers: RN approved session, co tx with PTA for mobility.      Subjective:  Patient states:  Pt agreeable to therapy session.   Pain:   denies pain.      Objective:    Observation: Pt sitting in chair upon arrival.    Objective Measures:  Pt alert and oriented  and increased lethargy.     Treatment, including education:  Therapeutic Activity Training:   Therapeutic activity training was instructed today.  Cues were given for safety, sequence, UE/LE placement, awareness, and balance.    Activities performed today included bed mobility training, sup-sit, sit-stand, SPT.     Pt seated in chair upon arrival.  Pt completed sit to stand from chair with Min A x2 with cardiac walker and increased verbal and tactile cues for hand placement and completed functional mobility from chair to bed with MIN A x2 and seated in chair with MIN A x2.  Pt required  seated rest break. Pt completed sit to stand from armed chair with MIN A x2 and completed seated rest with MIN A x2.  Pt completed less than  household distance and needed MOD A for walker management and MIN A x2.  Pt returned seated in chair with MIN A x2. Pt seated in chair with all needs met and call light in reach left with RN.      Assessment / Impression:    Patient's tolerance of treatment: Well  Adverse Reaction: None  Significant change in status and impact: Improved from initial evaluation  Barriers to improvement: None noted        Plan for Next Session:    Continue OT POC     Time in:  0950  Time out:  1028  Timed treatment minutes:  38  Total treatment time:  38         Electronically signed by:       GAYLE Ariza

## 2025-01-31 NOTE — PROGRESS NOTES
0720: EKG completed with the following with results: \"Atrial fibrillation with rapid ventricular response ST elevation, consider lateral injury or acute infarct, abnormal ECG.\" JOSH Mayorga notified via telephone. Received telephone order with readback to notify Dr. Byrd via telephone per JOSH Mayorga.     0722: this RN attempted to call Dr. Byrd via telephone call.     0726: this RN received telephone order with readback for 150 mg of amiodarone in dextrose 5% 100 mL bolus administered at 600 mL/hr over 10 minutes, then initiate 450 mg amiodarone in dextrose 5% 250 mL gtt at 1 mg/min to infuse for six hours, then decrease rate to 0.5 mg/min per protocol, per JOSH Mayorga.

## 2025-01-31 NOTE — CARE COORDINATION
Reviewed medical record, discussed pt with [facility]/AL. Pt is POD #3 after CABG x3. PT/OT recommended ARU. Pt was agreeable. Dr. Dodd/AL currently reviewing. Pt will not be ready for d/c over the weekend, might be ready on Monday.

## 2025-01-31 NOTE — PROGRESS NOTES
0945: Jackie Hussein, SLP at bedside for speech evaluation. Received verbal order with readback to advance diet to dysphagia soft, bite sized, 4 carb choices, cardiac diet, low fat, low cholesterol, high fiber, low sodium diet per Jackie Hussein, SLP.

## 2025-02-01 ENCOUNTER — APPOINTMENT (OUTPATIENT)
Dept: GENERAL RADIOLOGY | Age: 75
DRG: 236 | End: 2025-02-01
Attending: THORACIC SURGERY (CARDIOTHORACIC VASCULAR SURGERY)
Payer: MEDICARE

## 2025-02-01 LAB
ANION GAP SERPL CALCULATED.3IONS-SCNC: 10 MMOL/L (ref 9–17)
BUN SERPL-MCNC: 37 MG/DL (ref 7–20)
CA-I BLD-SCNC: 1.13 MMOL/L (ref 1.15–1.33)
CALCIUM SERPL-MCNC: 8.3 MG/DL (ref 8.3–10.6)
CHLORIDE SERPL-SCNC: 103 MMOL/L (ref 99–110)
CO2 SERPL-SCNC: 22 MMOL/L (ref 21–32)
CREAT SERPL-MCNC: 0.7 MG/DL (ref 0.6–1.2)
ERYTHROCYTE [DISTWIDTH] IN BLOOD BY AUTOMATED COUNT: 14.1 % (ref 11.7–14.9)
GFR, ESTIMATED: 79 ML/MIN/1.73M2
GLUCOSE BLD-MCNC: 135 MG/DL (ref 74–99)
GLUCOSE SERPL-MCNC: 132 MG/DL (ref 74–99)
HCT VFR BLD AUTO: 26.4 % (ref 37–47)
HGB BLD-MCNC: 8.1 G/DL (ref 12.5–16)
MAGNESIUM SERPL-MCNC: 2.3 MG/DL (ref 1.8–2.4)
MCH RBC QN AUTO: 29.3 PG (ref 27–31)
MCHC RBC AUTO-ENTMCNC: 30.7 G/DL (ref 32–36)
MCV RBC AUTO: 95.7 FL (ref 78–100)
PHOSPHATE SERPL-MCNC: 3.5 MG/DL (ref 2.5–4.9)
PLATELET # BLD AUTO: 255 K/UL (ref 140–440)
PMV BLD AUTO: 10.7 FL (ref 7.5–11.1)
POTASSIUM SERPL-SCNC: 4.7 MMOL/L (ref 3.5–5.1)
RBC # BLD AUTO: 2.76 M/UL (ref 4.2–5.4)
SODIUM SERPL-SCNC: 135 MMOL/L (ref 136–145)
WBC OTHER # BLD: 18.4 K/UL (ref 4–10.5)

## 2025-02-01 PROCEDURE — 6370000000 HC RX 637 (ALT 250 FOR IP): Performed by: STUDENT IN AN ORGANIZED HEALTH CARE EDUCATION/TRAINING PROGRAM

## 2025-02-01 PROCEDURE — 83735 ASSAY OF MAGNESIUM: CPT

## 2025-02-01 PROCEDURE — 97110 THERAPEUTIC EXERCISES: CPT

## 2025-02-01 PROCEDURE — 71045 X-RAY EXAM CHEST 1 VIEW: CPT

## 2025-02-01 PROCEDURE — 2580000003 HC RX 258: Performed by: PHYSICIAN ASSISTANT

## 2025-02-01 PROCEDURE — 97530 THERAPEUTIC ACTIVITIES: CPT

## 2025-02-01 PROCEDURE — 2500000003 HC RX 250 WO HCPCS: Performed by: PHYSICIAN ASSISTANT

## 2025-02-01 PROCEDURE — 99232 SBSQ HOSP IP/OBS MODERATE 35: CPT | Performed by: INTERNAL MEDICINE

## 2025-02-01 PROCEDURE — 82330 ASSAY OF CALCIUM: CPT

## 2025-02-01 PROCEDURE — 80048 BASIC METABOLIC PNL TOTAL CA: CPT

## 2025-02-01 PROCEDURE — 2060000000 HC ICU INTERMEDIATE R&B

## 2025-02-01 PROCEDURE — 36592 COLLECT BLOOD FROM PICC: CPT

## 2025-02-01 PROCEDURE — 97116 GAIT TRAINING THERAPY: CPT

## 2025-02-01 PROCEDURE — 36415 COLL VENOUS BLD VENIPUNCTURE: CPT

## 2025-02-01 PROCEDURE — 6370000000 HC RX 637 (ALT 250 FOR IP): Performed by: PHYSICIAN ASSISTANT

## 2025-02-01 PROCEDURE — 6360000002 HC RX W HCPCS: Performed by: PHYSICIAN ASSISTANT

## 2025-02-01 PROCEDURE — 84100 ASSAY OF PHOSPHORUS: CPT

## 2025-02-01 PROCEDURE — 94761 N-INVAS EAR/PLS OXIMETRY MLT: CPT

## 2025-02-01 PROCEDURE — 82962 GLUCOSE BLOOD TEST: CPT

## 2025-02-01 PROCEDURE — 2100000000 HC CCU R&B

## 2025-02-01 PROCEDURE — 6360000002 HC RX W HCPCS: Performed by: THORACIC SURGERY (CARDIOTHORACIC VASCULAR SURGERY)

## 2025-02-01 PROCEDURE — 85027 COMPLETE CBC AUTOMATED: CPT

## 2025-02-01 PROCEDURE — 2500000003 HC RX 250 WO HCPCS: Performed by: THORACIC SURGERY (CARDIOTHORACIC VASCULAR SURGERY)

## 2025-02-01 RX ADMIN — ENOXAPARIN SODIUM 40 MG: 100 INJECTION SUBCUTANEOUS at 10:36

## 2025-02-01 RX ADMIN — GUAIFENESIN 1200 MG: 600 TABLET ORAL at 21:53

## 2025-02-01 RX ADMIN — ALPRAZOLAM 0.25 MG: 0.25 TABLET ORAL at 22:03

## 2025-02-01 RX ADMIN — ONDANSETRON 4 MG: 2 INJECTION INTRAMUSCULAR; INTRAVENOUS at 05:32

## 2025-02-01 RX ADMIN — GABAPENTIN 300 MG: 300 CAPSULE ORAL at 10:35

## 2025-02-01 RX ADMIN — METOPROLOL TARTRATE 37.5 MG: 25 TABLET, FILM COATED ORAL at 21:54

## 2025-02-01 RX ADMIN — FUROSEMIDE 40 MG: 10 INJECTION, SOLUTION INTRAMUSCULAR; INTRAVENOUS at 19:01

## 2025-02-01 RX ADMIN — POTASSIUM CHLORIDE 20 MEQ: 1500 TABLET, EXTENDED RELEASE ORAL at 10:35

## 2025-02-01 RX ADMIN — MUPIROCIN: 20 OINTMENT TOPICAL at 21:54

## 2025-02-01 RX ADMIN — METOPROLOL TARTRATE 37.5 MG: 25 TABLET, FILM COATED ORAL at 10:34

## 2025-02-01 RX ADMIN — SODIUM CHLORIDE, PRESERVATIVE FREE 10 ML: 5 INJECTION INTRAVENOUS at 21:56

## 2025-02-01 RX ADMIN — BACITRACIN: 500 OINTMENT TOPICAL at 21:54

## 2025-02-01 RX ADMIN — ACETAMINOPHEN 650 MG: 325 TABLET ORAL at 05:33

## 2025-02-01 RX ADMIN — BACITRACIN: 500 OINTMENT TOPICAL at 09:02

## 2025-02-01 RX ADMIN — ACETAMINOPHEN 650 MG: 325 TABLET ORAL at 21:53

## 2025-02-01 RX ADMIN — ACETAMINOPHEN 650 MG: 325 TABLET ORAL at 10:32

## 2025-02-01 RX ADMIN — MUPIROCIN: 20 OINTMENT TOPICAL at 09:02

## 2025-02-01 RX ADMIN — AMIODARONE HYDROCHLORIDE 0.5 MG/MIN: 50 INJECTION, SOLUTION INTRAVENOUS at 00:28

## 2025-02-01 RX ADMIN — TRAMADOL HYDROCHLORIDE 50 MG: 50 TABLET, COATED ORAL at 05:34

## 2025-02-01 RX ADMIN — LEVOTHYROXINE SODIUM 125 MCG: 25 TABLET ORAL at 06:03

## 2025-02-01 RX ADMIN — ATORVASTATIN CALCIUM 40 MG: 40 TABLET, FILM COATED ORAL at 21:53

## 2025-02-01 RX ADMIN — CLOPIDOGREL BISULFATE 75 MG: 75 TABLET ORAL at 10:36

## 2025-02-01 RX ADMIN — CHLORHEXIDINE GLUCONATE 0.12% ORAL RINSE 15 ML: 1.2 LIQUID ORAL at 10:32

## 2025-02-01 RX ADMIN — FAMOTIDINE 20 MG: 20 TABLET, FILM COATED ORAL at 21:54

## 2025-02-01 RX ADMIN — Medication 1 TABLET: at 10:36

## 2025-02-01 RX ADMIN — SODIUM CHLORIDE, PRESERVATIVE FREE 10 ML: 5 INJECTION INTRAVENOUS at 10:37

## 2025-02-01 RX ADMIN — WATER 1000 MG: 1 INJECTION INTRAMUSCULAR; INTRAVENOUS; SUBCUTANEOUS at 06:03

## 2025-02-01 RX ADMIN — FUROSEMIDE 40 MG: 10 INJECTION, SOLUTION INTRAMUSCULAR; INTRAVENOUS at 10:37

## 2025-02-01 RX ADMIN — POTASSIUM CHLORIDE 20 MEQ: 1500 TABLET, EXTENDED RELEASE ORAL at 19:01

## 2025-02-01 RX ADMIN — ASPIRIN 81 MG CHEWABLE TABLET 81 MG: 81 TABLET CHEWABLE at 10:35

## 2025-02-01 RX ADMIN — AMLODIPINE BESYLATE 2.5 MG: 5 TABLET ORAL at 10:36

## 2025-02-01 ASSESSMENT — PAIN DESCRIPTION - LOCATION: LOCATION: CHEST

## 2025-02-01 ASSESSMENT — PAIN DESCRIPTION - PAIN TYPE: TYPE: SURGICAL PAIN

## 2025-02-01 ASSESSMENT — PAIN DESCRIPTION - ORIENTATION: ORIENTATION: MID

## 2025-02-01 ASSESSMENT — PAIN SCALES - GENERAL
PAINLEVEL_OUTOF10: 7
PAINLEVEL_OUTOF10: 5

## 2025-02-01 ASSESSMENT — PAIN DESCRIPTION - ONSET: ONSET: ON-GOING

## 2025-02-01 ASSESSMENT — PAIN DESCRIPTION - DESCRIPTORS: DESCRIPTORS: ACHING;DISCOMFORT

## 2025-02-01 ASSESSMENT — PAIN DESCRIPTION - FREQUENCY: FREQUENCY: CONTINUOUS

## 2025-02-01 ASSESSMENT — PAIN - FUNCTIONAL ASSESSMENT: PAIN_FUNCTIONAL_ASSESSMENT: ACTIVITIES ARE NOT PREVENTED

## 2025-02-01 NOTE — PLAN OF CARE
Problem: ABCDS Injury Assessment  Goal: Absence of physical injury  2/1/2025 0806 by Beverly Caba, RN  Outcome: Progressing  2/1/2025 0049 by Iftikhar Riley RN  Outcome: Progressing     Problem: Safety - Adult  Goal: Free from fall injury  2/1/2025 0806 by Beverly Caba, RN  Outcome: Progressing  2/1/2025 0049 by Iftikhar Riley RN  Outcome: Progressing     Problem: Discharge Planning  Goal: Discharge to home or other facility with appropriate resources  Outcome: Progressing     Problem: Pain  Goal: Verbalizes/displays adequate comfort level or baseline comfort level  Outcome: Progressing     Problem: Skin/Tissue Integrity  Goal: Skin integrity remains intact  Description: 1.  Monitor for areas of redness and/or skin breakdown  2.  Assess vascular access sites hourly  3.  Every 4-6 hours minimum:  Change oxygen saturation probe site  4.  Every 4-6 hours:  If on nasal continuous positive airway pressure, respiratory therapy assess nares and determine need for appliance change or resting period  Outcome: Progressing  Flowsheets (Taken 2/1/2025 0804)  Skin Integrity Remains Intact:   Monitor for areas of redness and/or skin breakdown   Assess vascular access sites hourly

## 2025-02-01 NOTE — PROGRESS NOTES
Today's plan: s/p cabg, left radial artery LAD, svg to diagonal, SVG to OM, FLAKO ligation      Admit Date:  1/27/2025    Subjective: s/p CABG      Chief complaints on admission S/P cabg        History of present illness:Rachel is a 74 y.o.year old who  presents with CABG    Past medical history:    has a past medical history of Allergic rhinitis, Breast cancer (HCC), Depression, Family history of coronary artery disease, Fibroids, Frozen shoulder, GERD (gastroesophageal reflux disease), H/O cardiovascular stress test, H/O chest x-ray, H/O echocardiogram, History of exercise stress test, History of left heart catheterization (LHC), History of transesophageal echocardiography (BELLE), Hx of  Carotid Doppler ultrasound, Hyperlipidemia, Hypertension, Hypothyroidism, and VHD (valvular heart disease).  Past surgical history:   has a past surgical history that includes Mastectomy (11/2005); Hysterectomy (1984); other surgical history (12/12/08); Cholecystectomy (10/24/2007); Breast surgery (6/9/2006); Breast lumpectomy (, 1/18/02); Thyroidectomy; Cardiac procedure (N/A, 1/23/2025); and Coronary artery bypass graft (N/A, 1/28/2025).  Social History:   reports that she has never smoked. She has never used smokeless tobacco. She reports that she does not drink alcohol and does not use drugs.  Family history:  family history includes Heart Attack in her father, maternal grandfather, paternal uncle, paternal uncle, paternal uncle, and paternal uncle; Heart Surgery (age of onset: 72) in her father; High Cholesterol in her brother and father; Hypertension in her brother and father; Stroke in her mother.    Allergies   Allergen Reactions    Pcn [Penicillins] Hives    Macrobid [Nitrofurantoin Monohyd Macro]     Naproxen Nausea Only    Codeine Rash    Sulfa Antibiotics Rash    Sulfamethazine Rash    Trimethoprim Rash         Objective:   BP (!) 162/119   Pulse 68   Temp 97.7 °F (36.5 °C) (Oral)   Resp 22   Ht 1.6 m (5' 3\")   Wt  BID    potassium chloride  20 mEq Oral BID WC    lidocaine  2 patch TransDERmal Daily    sodium chloride flush  5-40 mL IntraVENous 2 times per day    latanoprost  1 drop Both Eyes Nightly    sodium chloride flush  5-40 mL IntraVENous 2 times per day    enoxaparin  40 mg SubCUTAneous Daily    aspirin  81 mg Oral Daily    clopidogrel  75 mg Oral Daily    chlorhexidine  15 mL Mouth/Throat BID    mupirocin   Each Nostril BID    multivitamin  1 tablet Oral Daily with breakfast    polyethylene glycol  17 g Oral Daily    sennosides-docusate sodium  1 tablet Oral BID    atorvastatin  40 mg Oral Nightly    famotidine  20 mg Oral QHS    Or    famotidine (PEPCID) injection  20 mg IntraVENous QHS    bacitracin   Topical BID    acetaminophen  650 mg Oral Q4H    guaiFENesin  1,200 mg Oral BID      sodium chloride      sodium chloride      norepinephrine Stopped (01/29/25 0029)    niCARdipine Stopped (01/29/25 1234)    dextrose       sodium chloride flush, sodium chloride, ondansetron, potassium chloride **OR** potassium alternative oral replacement **OR** potassium chloride, magnesium sulfate, sodium phosphate 15 mmol in sodium chloride 0.9 % 250 mL IVPB, sodium chloride flush, sodium chloride, bisacodyl, potassium chloride, calcium chloride 1,000 mg in sodium chloride 0.9 % 100 mL IVPB **OR** calcium chloride 2,000 mg in sodium chloride 0.9 % 100 mL IVPB, albumin human 5%, norepinephrine, niCARdipine, glucose, dextrose bolus **OR** dextrose bolus, glucagon (rDNA), dextrose, prochlorperazine, ALPRAZolam    Lab Data:  CBC:   Recent Labs     01/30/25  0340 01/31/25  0455 02/01/25  0642   WBC 21.4* 19.0* 18.4*   HGB 8.4* 8.0* 8.1*   HCT 26.6* 25.8* 26.4*   MCV 96.7 95.2 95.7    177 255     BMP:   Recent Labs     01/30/25  0340 01/31/25  0455 02/01/25  0522    137 135*   K 4.6 3.9 4.7    108 103   CO2 23 21 22   PHOS 3.0 2.0* 3.5   BUN 24* 31* 37*   CREATININE 0.7 0.6 0.7     LIVER PROFILE:   Recent Labs

## 2025-02-01 NOTE — PROGRESS NOTES
Physical Therapy    Physical Therapy Treatment Note  Name: Rachel Mcdaniel MRN: [id number] :   1950   Date:  2025   Admission Date: 2025 Room:  -A   Restrictions/Precautions:              general precautions, falls, sternal precautions, chest tubes x2, bundy, pacer wires   Communication with other providers:  per nurse ok to tx  Subjective:  Patient states:  agreeable to tx  Pain:   Location, Type, Intensity (0/10 to 10/10):  no c/o during tx  Objective:    Observation:  alert and oriented on  2l iters O2. Family present and supportive  Objective Measures:  vital WNL during tx session  Treatment, including education/measures:  Education:  Pt and son given cardiac handout and instructed in Sternal Precautions, Purpose of Exercise Program, [place]den Scale and Signs and Symptoms of Exercise Intolerance per Cardiac Protocol   Pt was given Initial Cardiac Exercises in Supine:  Ankle Pumps x 10  Hip Abduction x 10  Heel Slides x 10  Shoulder Rolls x 10  Head Turns x 10  Front Arm Raises x 10   Arm Crosses x 10   Sit<=>stand min assist  Amb min assist and cues with cw 120'. Pt's knees buckling and chair brought up and pt returned to room.  Safety  Patient left safely in the chair, with call light/phone in reach with alarm applied. Gait belt was used for transfers and gait.   Assessment / Impression:      Patient's tolerance of treatment:  good   Adverse Reaction: na  Significant change in status and impact:  na  Barriers to improvement:  strength and safety  Plan for Next Session:    Cont. POC      Time in:  1115  Time out:  1155  Timed treatment minutes:  40  Total treatment time:  40    Previously filed items:  Social/Functional History  Lives With: Son, Family (DIL, grandkids)  Type of Home: House  Home Layout: Multi-level (Bedroom in basement, 13 steps down to basement)  Home Access: Stairs to enter without rails  Entrance Stairs - Number of Steps: 3  Bathroom Shower/Tub: Walk-in shower  Bathroom

## 2025-02-01 NOTE — PROGRESS NOTES
[facility] Cardiothoracic Surgery  Daily Progress Note    Surgeon:  Dr. Byrd       Subjective:  Ms. Mcdaniel is a 74 y.o. year-old female status post CORONARY ARTERY BYPASS GRAFT X; LEFT RADIAL ARTERY-LAD; SVG-DIAG; SVG-OM1; LEFT ENDOSCOPIC GREATER SAPHENOUS VEIN HARVEST; LEFT ENDOSCOPIC RADIAL ARETY HARVEST; LEFT ATRIAL APPENDAGE LIGATION WITH A 45MM ATRICLIP; BELLE;  on 1/28/25.      Patient was seen and examined at bedside this morning without any complaints.  Patient went into atrial fibrillation on 1/30 at 8 AM.  Converted to normal sinus rhythm on 1/31 10:30 AM. Amio gtt to be DC saturday AM        Hospital Course:  1/27/25: Patient was brought to the hospital for BELLE.  Direct admitted by Dr. Byrd to preop for surgical intervention.  Preoperative testing complete.  1/28/2025: Patient underwent surgical intervention.  Tolerated the procedure well. Transferred to the CVICU in a stable condition.   1/29/25: Gtts: Cardene at 2.5.  Start Norvasc 2.5 mg daily then discontinue the Cardene 4 hours after.  On 4 L nasal cannula of oxygen.  Wean as tolerated.  Arterial line removed this morning.  Maintain bedrest for 4 hours then okay to get up and work with physical therapy.  Remove [place].  Possibly remove hard mediastinal chest tube after patient walks today.  Maintain transcutaneous pacing wires.  Small mediastinal chest tube fell out in the evening.  Stroke alert was called on the patient due to lethargy and right-sided weakness.  All testing was negative.  1/30/25: Patient went into atrial fibrillation this morning.  Received 2 amiodarone boluses per Dr. Byrd and is on amiodarone drip at 1.  Cordis in place.  Dr. Byrd would like a PICC line and remove Cordis.  Started on Rocephin for 7 days due to increased white blood cell count and possible pneumonia per Dr. Byrd.  Speech therapy evaluated the patient for possible aspiration which was  pedicle and cardiac silhouette are prominent. Surgical   changes of prior median sternotomy are seen. Mediastinal chest tubes are noted.   There is persistent bibasilar interstitial prominence and small pleural   effusions. A right upper extremity PICC is seen with the tip in the SVC. The mid    to upper lung fields are clear.     IMPRESSION:   1. No significant interval change     Scheduled Meds:    amLODIPine  2.5 mg Oral Daily    metoprolol tartrate  37.5 mg Oral BID    levothyroxine  125 mcg Oral Daily    cefTRIAXone (ROCEPHIN) IV  1,000 mg IntraVENous Q24H    furosemide  40 mg IntraVENous BID    potassium chloride  20 mEq Oral BID WC    lidocaine  2 patch TransDERmal Daily    sodium chloride flush  5-40 mL IntraVENous 2 times per day    latanoprost  1 drop Both Eyes Nightly    sodium chloride flush  5-40 mL IntraVENous 2 times per day    enoxaparin  40 mg SubCUTAneous Daily    aspirin  81 mg Oral Daily    clopidogrel  75 mg Oral Daily    chlorhexidine  15 mL Mouth/Throat BID    mupirocin   Each Nostril BID    multivitamin  1 tablet Oral Daily with breakfast    polyethylene glycol  17 g Oral Daily    sennosides-docusate sodium  1 tablet Oral BID    atorvastatin  40 mg Oral Nightly    famotidine  20 mg Oral QHS    Or    famotidine (PEPCID) injection  20 mg IntraVENous QHS    bacitracin   Topical BID    gabapentin  300 mg Oral TID    acetaminophen  650 mg Oral Q4H    guaiFENesin  1,200 mg Oral BID     Continuous Infusions:    sodium chloride      sodium chloride      norepinephrine Stopped (01/29/25 0029)    niCARdipine Stopped (01/29/25 1234)    dextrose             Physical Exam:    General: A&O x 3, NAD noted  Heart: Normal S1, S2, RRR, No murmurs noted   Sternum: Prevena in place   Lungs: Diminished BS bilaterally at the bases. CT's in place to LWS. No air leak noted   Abdomen: Soft, non tender, non distended, Hypoactive BS x 4   : Browning in place   Extremities: No edema noted bilateral LE. EVH sites: CDI

## 2025-02-02 ENCOUNTER — APPOINTMENT (OUTPATIENT)
Dept: GENERAL RADIOLOGY | Age: 75
DRG: 236 | End: 2025-02-02
Attending: THORACIC SURGERY (CARDIOTHORACIC VASCULAR SURGERY)
Payer: MEDICARE

## 2025-02-02 LAB
ANION GAP SERPL CALCULATED.3IONS-SCNC: 10 MMOL/L (ref 9–17)
BUN SERPL-MCNC: 22 MG/DL (ref 7–20)
CA-I BLD-SCNC: 1.16 MMOL/L (ref 1.15–1.33)
CALCIUM SERPL-MCNC: 8.2 MG/DL (ref 8.3–10.6)
CHLORIDE SERPL-SCNC: 102 MMOL/L (ref 99–110)
CO2 SERPL-SCNC: 24 MMOL/L (ref 21–32)
CREAT SERPL-MCNC: 0.5 MG/DL (ref 0.6–1.2)
ERYTHROCYTE [DISTWIDTH] IN BLOOD BY AUTOMATED COUNT: 13.8 % (ref 11.7–14.9)
GFR, ESTIMATED: >90 ML/MIN/1.73M2
GLUCOSE SERPL-MCNC: 114 MG/DL (ref 74–99)
HCT VFR BLD AUTO: 26.4 % (ref 37–47)
HGB BLD-MCNC: 8.3 G/DL (ref 12.5–16)
INR PPP: 1.2
MAGNESIUM SERPL-MCNC: 2.2 MG/DL (ref 1.8–2.4)
MCH RBC QN AUTO: 30.2 PG (ref 27–31)
MCHC RBC AUTO-ENTMCNC: 31.4 G/DL (ref 32–36)
MCV RBC AUTO: 96 FL (ref 78–100)
PARTIAL THROMBOPLASTIN TIME: 30.1 SEC (ref 25.1–37.1)
PARTIAL THROMBOPLASTIN TIME: 59.4 SEC (ref 25.1–37.1)
PARTIAL THROMBOPLASTIN TIME: 61.7 SEC (ref 25.1–37.1)
PHOSPHATE SERPL-MCNC: 2.2 MG/DL (ref 2.5–4.9)
PLATELET # BLD AUTO: 290 K/UL (ref 140–440)
PMV BLD AUTO: 10.6 FL (ref 7.5–11.1)
POTASSIUM SERPL-SCNC: 4.4 MMOL/L (ref 3.5–5.1)
PROTHROMBIN TIME: 15.3 SEC (ref 11.7–14.5)
RBC # BLD AUTO: 2.75 M/UL (ref 4.2–5.4)
SODIUM SERPL-SCNC: 136 MMOL/L (ref 136–145)
WBC OTHER # BLD: 14.2 K/UL (ref 4–10.5)

## 2025-02-02 PROCEDURE — 6370000000 HC RX 637 (ALT 250 FOR IP): Performed by: STUDENT IN AN ORGANIZED HEALTH CARE EDUCATION/TRAINING PROGRAM

## 2025-02-02 PROCEDURE — 6360000002 HC RX W HCPCS: Performed by: PHYSICIAN ASSISTANT

## 2025-02-02 PROCEDURE — 71045 X-RAY EXAM CHEST 1 VIEW: CPT

## 2025-02-02 PROCEDURE — 2500000003 HC RX 250 WO HCPCS: Performed by: PHYSICIAN ASSISTANT

## 2025-02-02 PROCEDURE — 97116 GAIT TRAINING THERAPY: CPT

## 2025-02-02 PROCEDURE — 97110 THERAPEUTIC EXERCISES: CPT

## 2025-02-02 PROCEDURE — 85730 THROMBOPLASTIN TIME PARTIAL: CPT

## 2025-02-02 PROCEDURE — 6360000002 HC RX W HCPCS: Performed by: THORACIC SURGERY (CARDIOTHORACIC VASCULAR SURGERY)

## 2025-02-02 PROCEDURE — 84100 ASSAY OF PHOSPHORUS: CPT

## 2025-02-02 PROCEDURE — 97530 THERAPEUTIC ACTIVITIES: CPT

## 2025-02-02 PROCEDURE — 83735 ASSAY OF MAGNESIUM: CPT

## 2025-02-02 PROCEDURE — 2060000000 HC ICU INTERMEDIATE R&B

## 2025-02-02 PROCEDURE — 2700000000 HC OXYGEN THERAPY PER DAY

## 2025-02-02 PROCEDURE — 85610 PROTHROMBIN TIME: CPT

## 2025-02-02 PROCEDURE — 85027 COMPLETE CBC AUTOMATED: CPT

## 2025-02-02 PROCEDURE — 6370000000 HC RX 637 (ALT 250 FOR IP): Performed by: PHYSICIAN ASSISTANT

## 2025-02-02 PROCEDURE — 80048 BASIC METABOLIC PNL TOTAL CA: CPT

## 2025-02-02 PROCEDURE — 99232 SBSQ HOSP IP/OBS MODERATE 35: CPT | Performed by: INTERNAL MEDICINE

## 2025-02-02 PROCEDURE — 82330 ASSAY OF CALCIUM: CPT

## 2025-02-02 PROCEDURE — 2500000003 HC RX 250 WO HCPCS: Performed by: THORACIC SURGERY (CARDIOTHORACIC VASCULAR SURGERY)

## 2025-02-02 PROCEDURE — 36415 COLL VENOUS BLD VENIPUNCTURE: CPT

## 2025-02-02 PROCEDURE — 94761 N-INVAS EAR/PLS OXIMETRY MLT: CPT

## 2025-02-02 RX ORDER — HEPARIN SODIUM 1000 [USP'U]/ML
60 INJECTION, SOLUTION INTRAVENOUS; SUBCUTANEOUS PRN
Status: DISCONTINUED | OUTPATIENT
Start: 2025-02-02 | End: 2025-02-03

## 2025-02-02 RX ORDER — AMIODARONE HYDROCHLORIDE 200 MG/1
200 TABLET ORAL 2 TIMES DAILY
Status: DISCONTINUED | OUTPATIENT
Start: 2025-02-02 | End: 2025-02-04

## 2025-02-02 RX ORDER — HEPARIN SODIUM 1000 [USP'U]/ML
30 INJECTION, SOLUTION INTRAVENOUS; SUBCUTANEOUS PRN
Status: DISCONTINUED | OUTPATIENT
Start: 2025-02-02 | End: 2025-02-03

## 2025-02-02 RX ORDER — HEPARIN SODIUM 1000 [USP'U]/ML
60 INJECTION, SOLUTION INTRAVENOUS; SUBCUTANEOUS ONCE
Status: COMPLETED | OUTPATIENT
Start: 2025-02-02 | End: 2025-02-02

## 2025-02-02 RX ORDER — HEPARIN SODIUM 10000 [USP'U]/100ML
5-30 INJECTION, SOLUTION INTRAVENOUS CONTINUOUS
Status: DISCONTINUED | OUTPATIENT
Start: 2025-02-02 | End: 2025-02-03

## 2025-02-02 RX ORDER — HEPARIN SODIUM 10000 [USP'U]/100ML
5-30 INJECTION, SOLUTION INTRAVENOUS CONTINUOUS
Status: DISCONTINUED | OUTPATIENT
Start: 2025-02-02 | End: 2025-02-02

## 2025-02-02 RX ADMIN — MUPIROCIN: 20 OINTMENT TOPICAL at 08:35

## 2025-02-02 RX ADMIN — ALPRAZOLAM 0.25 MG: 0.25 TABLET ORAL at 21:17

## 2025-02-02 RX ADMIN — FUROSEMIDE 40 MG: 10 INJECTION, SOLUTION INTRAMUSCULAR; INTRAVENOUS at 17:25

## 2025-02-02 RX ADMIN — AMIODARONE HYDROCHLORIDE 200 MG: 200 TABLET ORAL at 08:33

## 2025-02-02 RX ADMIN — SODIUM CHLORIDE, PRESERVATIVE FREE 10 ML: 5 INJECTION INTRAVENOUS at 20:59

## 2025-02-02 RX ADMIN — SODIUM CHLORIDE, PRESERVATIVE FREE 10 ML: 5 INJECTION INTRAVENOUS at 08:35

## 2025-02-02 RX ADMIN — METOPROLOL TARTRATE 37.5 MG: 25 TABLET, FILM COATED ORAL at 20:55

## 2025-02-02 RX ADMIN — POTASSIUM BICARBONATE 20 MEQ: 782 TABLET, EFFERVESCENT ORAL at 09:36

## 2025-02-02 RX ADMIN — ACETAMINOPHEN 650 MG: 325 TABLET ORAL at 07:39

## 2025-02-02 RX ADMIN — GUAIFENESIN 1200 MG: 600 TABLET ORAL at 20:57

## 2025-02-02 RX ADMIN — HEPARIN SODIUM 3900 UNITS: 1000 INJECTION INTRAVENOUS; SUBCUTANEOUS at 08:51

## 2025-02-02 RX ADMIN — ATORVASTATIN CALCIUM 40 MG: 40 TABLET, FILM COATED ORAL at 20:57

## 2025-02-02 RX ADMIN — CHLORHEXIDINE GLUCONATE 0.12% ORAL RINSE 15 ML: 1.2 LIQUID ORAL at 08:33

## 2025-02-02 RX ADMIN — LEVOTHYROXINE SODIUM 125 MCG: 25 TABLET ORAL at 07:39

## 2025-02-02 RX ADMIN — BACITRACIN: 500 OINTMENT TOPICAL at 20:57

## 2025-02-02 RX ADMIN — HEPARIN SODIUM 12 UNITS/KG/HR: 10000 INJECTION, SOLUTION INTRAVENOUS at 08:59

## 2025-02-02 RX ADMIN — ACETAMINOPHEN 650 MG: 325 TABLET ORAL at 20:57

## 2025-02-02 RX ADMIN — BACITRACIN: 500 OINTMENT TOPICAL at 08:35

## 2025-02-02 RX ADMIN — ACETAMINOPHEN 650 MG: 325 TABLET ORAL at 17:25

## 2025-02-02 RX ADMIN — METOPROLOL TARTRATE 37.5 MG: 25 TABLET, FILM COATED ORAL at 08:33

## 2025-02-02 RX ADMIN — GUAIFENESIN 1200 MG: 600 TABLET ORAL at 08:33

## 2025-02-02 RX ADMIN — SODIUM CHLORIDE, PRESERVATIVE FREE 10 ML: 5 INJECTION INTRAVENOUS at 21:00

## 2025-02-02 RX ADMIN — ACETAMINOPHEN 650 MG: 325 TABLET ORAL at 12:00

## 2025-02-02 RX ADMIN — Medication 1 TABLET: at 08:33

## 2025-02-02 RX ADMIN — FUROSEMIDE 40 MG: 10 INJECTION, SOLUTION INTRAMUSCULAR; INTRAVENOUS at 08:33

## 2025-02-02 RX ADMIN — AMIODARONE HYDROCHLORIDE 200 MG: 200 TABLET ORAL at 20:57

## 2025-02-02 RX ADMIN — FAMOTIDINE 20 MG: 20 TABLET, FILM COATED ORAL at 20:57

## 2025-02-02 RX ADMIN — AMLODIPINE BESYLATE 2.5 MG: 5 TABLET ORAL at 08:33

## 2025-02-02 RX ADMIN — POTASSIUM CHLORIDE 20 MEQ: 29.8 INJECTION, SOLUTION INTRAVENOUS at 17:36

## 2025-02-02 RX ADMIN — ASPIRIN 81 MG CHEWABLE TABLET 81 MG: 81 TABLET CHEWABLE at 08:33

## 2025-02-02 RX ADMIN — WATER 1000 MG: 1 INJECTION INTRAMUSCULAR; INTRAVENOUS; SUBCUTANEOUS at 07:39

## 2025-02-02 ASSESSMENT — PAIN - FUNCTIONAL ASSESSMENT: PAIN_FUNCTIONAL_ASSESSMENT: ACTIVITIES ARE NOT PREVENTED

## 2025-02-02 ASSESSMENT — PAIN DESCRIPTION - ORIENTATION: ORIENTATION: MID

## 2025-02-02 ASSESSMENT — PAIN DESCRIPTION - LOCATION: LOCATION: CHEST

## 2025-02-02 ASSESSMENT — PAIN DESCRIPTION - PAIN TYPE: TYPE: SURGICAL PAIN

## 2025-02-02 ASSESSMENT — PAIN DESCRIPTION - FREQUENCY: FREQUENCY: CONTINUOUS

## 2025-02-02 ASSESSMENT — PAIN DESCRIPTION - ONSET: ONSET: ON-GOING

## 2025-02-02 ASSESSMENT — PAIN SCALES - GENERAL: PAINLEVEL_OUTOF10: 3

## 2025-02-02 ASSESSMENT — PAIN DESCRIPTION - DESCRIPTORS: DESCRIPTORS: ACHING

## 2025-02-02 NOTE — PROGRESS NOTES
V2.0  [facility] Hospitalist Progress Note      Name:  Rachel Mcdaniel /Age/Sex: 1950  (74 y.o. female)   MRN & CSN:  [id number] & [id number] Encounter Date/Time: 2025 09:15 AM EST    Location:  -A PCP: Arnoldo Wheeler MD       Hospital Day: 6      Subjective:     Chief Complaint:  s/p CABG      Patient seen and examined at bedside. Doing well no new complaints or concerns.   On lasix 40 mg IV BID     Internal medicine will sign off. Please consult if there is any need.       Assessment and Plan:   CAD. S/p LHC 2025 with triple vessel disease  S/p CABG 2025  - on ASA, plavix and statin  - ctx managing  - chest tubes per ctx  - on amio drip     Essential HTN.  On home lopresor 25 mg BID and amlodipine 5 mg  - resumed but increased lopressor 37.5     Hypothyroidism. On home synthroid 150 mcg. Last TSH 0.03, free T4 2.4 2025.   TSH 0.02 2024  - decreased Synthroid 125 mcg   - check TSH, free T4 in 3-4 weeks      Personally reviewed Lab Studies and Imaging     Drugs that require monitoring for toxicity include lovenox and the method of monitoring was cbc      Diet ADULT DIET; Dysphagia - Soft and Bite Sized; 4 carb choices (60 gm/meal); Low Fat/Low Chol/High Fiber/2 gm Na  ADULT ORAL NUTRITION SUPPLEMENT; Breakfast, AM Snack, Lunch, PM Snack, Dinner; Standard High Calorie/High Protein Oral Supplement   DVT Prophylaxis [x] Lovenox, []  Heparin, [] SCDs, [] Ambulation,  [] Eliquis, [] Xarelto  [] Coumadin   Code Status Full Code   Disposition Per primary   Surrogate Decision Maker/ POA      Review of Systems:    Review of Systems    See above    Objective:     Intake/Output Summary (Last 24 hours) at 2025 1127  Last data filed at 2025 0800  Gross per 24 hour   Intake 600 ml   Output 3140 ml   Net -2540 ml        Vitals:   Vitals:    25 1100   BP: (!) 103/51   Pulse: 86   Resp: 23   Temp:    SpO2: 94%       Physical Exam:     General: NAD  Eyes: EOMI  ENT: neck  Daily PRN  potassium chloride, 20 mEq, PRN  calcium chloride 1,000 mg in sodium chloride 0.9 % 100 mL IVPB, 1,000 mg, PRN   Or  calcium chloride 2,000 mg in sodium chloride 0.9 % 100 mL IVPB, 2,000 mg, PRN  albumin human 5%, 12.5 g, PRN  norepinephrine, 1-100 mcg/min, Continuous PRN  niCARdipine, 2.5-15 mg/hr, Continuous PRN  glucose, 4 tablet, PRN  dextrose bolus, 125 mL, PRN   Or  dextrose bolus, 250 mL, PRN  glucagon (rDNA), 1 mg, PRN  dextrose, , Continuous PRN  prochlorperazine, 10 mg, Q6H PRN  ALPRAZolam, 0.25 mg, Q4H PRN        Labs      Recent Results (from the past 24 hour(s))   Basic Metabolic Panel    Collection Time: 02/02/25  5:45 AM   Result Value Ref Range    Sodium 136 136 - 145 mmol/L    Potassium 4.4 3.5 - 5.1 mmol/L    Chloride 102 99 - 110 mmol/L    CO2 24 21 - 32 mmol/L    Anion Gap 10 9 - 17 mmol/L    Glucose 114 (H) 74 - 99 mg/dL    BUN 22 (H) 7 - 20 mg/dL    Creatinine 0.5 (L) 0.6 - 1.2 mg/dL    Est, Glom Filt Rate >90 >60 mL/min/1.73m2    Calcium 8.2 (L) 8.3 - 10.6 mg/dL   CBC    Collection Time: 02/02/25  5:45 AM   Result Value Ref Range    WBC 14.2 (H) 4.0 - 10.5 k/uL    RBC 2.75 (L) 4.20 - 5.40 m/uL    Hemoglobin 8.3 (L) 12.5 - 16.0 g/dL    Hematocrit 26.4 (L) 37.0 - 47.0 %    MCV 96.0 78.0 - 100.0 fL    MCH 30.2 27.0 - 31.0 pg    MCHC 31.4 (L) 32.0 - 36.0 g/dL    RDW 13.8 11.7 - 14.9 %    Platelets 290 140 - 440 k/uL    MPV 10.6 7.5 - 11.1 fL   Calcium, Ionized    Collection Time: 02/02/25  5:45 AM   Result Value Ref Range    Calcium, Ionized 1.16 1.15 - 1.33 mmol/L   Phosphorus    Collection Time: 02/02/25  5:45 AM   Result Value Ref Range    Phosphorus 2.2 (L) 2.5 - 4.9 mg/dL   Magnesium    Collection Time: 02/02/25  5:45 AM   Result Value Ref Range    Magnesium 2.2 1.8 - 2.4 mg/dL   Protime-INR    Collection Time: 02/02/25  7:45 AM   Result Value Ref Range    Protime 15.3 (H) 11.7 - 14.5 sec    INR 1.2    APTT    Collection Time: 02/02/25  7:45 AM   Result Value Ref Range    APTT 30.1

## 2025-02-02 NOTE — PROGRESS NOTES
[facility] Cardiothoracic Surgery  Daily Progress Note    Surgeon:  Dr. Byrd       Subjective:  Ms. Mcdaniel is a 74 y.o. year-old female status post CORONARY ARTERY BYPASS GRAFT X; LEFT RADIAL ARTERY-LAD; SVG-DIAG; SVG-OM1; LEFT ENDOSCOPIC GREATER SAPHENOUS VEIN HARVEST; LEFT ENDOSCOPIC RADIAL ARETY HARVEST; LEFT ATRIAL APPENDAGE LIGATION WITH A 45MM ATRICLIP; BELLE;  on 1/28/25.      Patient was seen and examined at bedside this morning without any complaints.  Patient went into atrial fibrillation on 1/30 at 8 AM.  Converted to normal sinus rhythm on 1/31 10:30 AM. Amio gtt to be DC saturday AM        Hospital Course:  1/27/25: Patient was brought to the hospital for BELLE.  Direct admitted by Dr. Byrd to preop for surgical intervention.  Preoperative testing complete.  1/28/2025: Patient underwent surgical intervention.  Tolerated the procedure well. Transferred to the CVICU in a stable condition.   1/29/25: Gtts: Cardene at 2.5.  Start Norvasc 2.5 mg daily then discontinue the Cardene 4 hours after.  On 4 L nasal cannula of oxygen.  Wean as tolerated.  Arterial line removed this morning.  Maintain bedrest for 4 hours then okay to get up and work with physical therapy.  Remove [place].  Possibly remove hard mediastinal chest tube after patient walks today.  Maintain transcutaneous pacing wires.  Small mediastinal chest tube fell out in the evening.  Stroke alert was called on the patient due to lethargy and right-sided weakness.  All testing was negative.  1/30/25: Patient went into atrial fibrillation this morning.  Received 2 amiodarone boluses per Dr. Byrd and is on amiodarone drip at 1.  Cordis in place.  Dr. Byrd would like a PICC line and remove Cordis.  Started on Rocephin for 7 days due to increased white blood cell count and possible pneumonia per Dr. Byrd.  Speech therapy evaluated the patient for possible aspiration which was    Abdomen: Soft, non tender, non distended, Hypoactive BS x 4   : Browning in place   Extremities: No edema noted bilateral LE. EVH sites: CDI     : 290 cc/24hrs    Assessment:    Ms. Mcdaniel is a 74 y.o. year-old female status post CORONARY ARTERY BYPASS GRAFT X; LEFT RADIAL ARTERY-LAD; SVG-DIAG; SVG-OM1; LEFT ENDOSCOPIC GREATER SAPHENOUS VEIN HARVEST; LEFT ENDOSCOPIC RADIAL ARETY HARVEST; LEFT ATRIAL APPENDAGE LIGATION WITH A 45MM ATRICLIP; BELLE;  on 1/28/25.          Plan:  Back in atrial fibrillation this morning 2/2/25- rate controlled. Heparin gtt started. Hold plavix. Discussing possible cardioversion on Monday with cardiology  atrial fibrillation on 1/30 at 8 AM.    Converted to NSR on 1/31 10:30 AM.   Gtt off at 2/1/25 at 8 AM  Rate controlled afib 2/2/25 6am  Obtain EKG  Lasix 40 mg IV twice daily.  Lopressor 37.5 mg twice daily.  Rocephin continued for white blood cell count.  Continue to monitor.    Maintain transcutaneous pacing wires.  Maintain pleural chest tube today. Continues to have high output  Hospitalist adjustng Synthroid dose.  Adjusted by hospitalist. Discharge on new dose.      Level of Care: Stepdown  Patient Medically Ready for Discharge? - No     DC Plan: PT and OT consulted.  ARU following.    The above recommendations including medications and orders were discussed and agreed upon with Dr. Bobo Ng PA-C 02/02/25 7:46 AM        New Consults 8:00AM-4:30PM: Call Office, 4:30PM to 8:00AM Surgeon on-call    CVICU or other units patient follow up: Georgina author of this note 8:00AM-4:30PM    CVICU patient or urgent follow up: 4:30PM to 8:00AM Call or Page Surgeon on-call     Step-down patient follow up: 4:30PM to 8:00AM Page or secure chat PA on-call

## 2025-02-02 NOTE — PROGRESS NOTES
Patient refusing PO potassium replacement.     Clari MORENO made aware. Telephone orders to give patient 20 mEq IV potassium with lasix dose.       JULIO C Moore RN

## 2025-02-02 NOTE — PROGRESS NOTES
Physical Therapy    Physical Therapy Treatment Note  Name: Rachel Mcdaniel MRN: [id number] :   1950   Date:  2025   Admission Date: 2025 Room:  -A   Restrictions/Precautions:           general precautions, falls, sternal precautions, chest tubes x2, bundy, pacer wires   Communication with other providers:  per nurse ok to tx  Subjective:  Patient states:  pt needing encouragement and support and then agreeable to tx  Pain:   Location, Type, Intensity (0/10 to 10/10):  no c/o pain during tx session  Objective:    Observation:  alert and oriented  Objective Measures:  vitals WNL  Treatment, including education/measures:  Education:  Pt was given Initial Cardiac Exercises in Supine:  Ankle Pumps x 10  Hip Abduction x 10  Heel Slides x 10  Shoulder Rolls x 10  Head Turns x 10  Front Arm Raises x 10   Arm Crosses x 10   Sit<=>stand from chair and commode cga  Pt dependent to manage depends but was able to do kyaw care in sitting   Amb cga with cw 25' x 1, 35' x 1. Pt needing chair follow for safety  Pt amb 10' x 2 without assistive device needing mod assist.to/from bathroom.   Safety  Patient left safely in the chair, with call light/phone in reach with alarm applied. Gait belt was used for transfers and gait.   Assessment / Impression:      Patient's tolerance of treatment:  good   Adverse Reaction: na  Significant change in status and impact:  na  Barriers to improvement:  strength and safety  Plan for Next Session:    Cont. POC      Time in:  0945  Time out:  1040  Timed treatment minutes:  55  Total treatment time:  55    Previously filed items:  Social/Functional History  Lives With: Son, Family (DIL, grandkids)  Type of Home: House  Home Layout: Multi-level (Bedroom in basement, 13 steps down to basement)  Home Access: Stairs to enter without rails  Entrance Stairs - Number of Steps: 3  Bathroom Shower/Tub: Walk-in shower  Bathroom Equipment: None  Home Equipment: None  Has the patient had

## 2025-02-02 NOTE — PROGRESS NOTES
Today's plan: s/p cabg, left radial artery LAD, svg to diagonal, SVG to OM, FLAKO ligation  FOR BELLE cardioversion tomorrow, continue oral amiodarone and IVhep    Admit Date:  1/27/2025    Subjective: s/p CABG      Chief complaints on admission S/P cabg        History of present illness:Rachel is a 74 y.o.year old who  presents with CABG    Past medical history:    has a past medical history of Allergic rhinitis, Breast cancer (HCC), Depression, Family history of coronary artery disease, Fibroids, Frozen shoulder, GERD (gastroesophageal reflux disease), H/O cardiovascular stress test, H/O chest x-ray, H/O echocardiogram, History of exercise stress test, History of left heart catheterization (LHC), History of transesophageal echocardiography (BELLE), Hx of  Carotid Doppler ultrasound, Hyperlipidemia, Hypertension, Hypothyroidism, and VHD (valvular heart disease).  Past surgical history:   has a past surgical history that includes Mastectomy (11/2005); Hysterectomy (1984); other surgical history (12/12/08); Cholecystectomy (10/24/2007); Breast surgery (6/9/2006); Breast lumpectomy (, 1/18/02); Thyroidectomy; Cardiac procedure (N/A, 1/23/2025); and Coronary artery bypass graft (N/A, 1/28/2025).  Social History:   reports that she has never smoked. She has never used smokeless tobacco. She reports that she does not drink alcohol and does not use drugs.  Family history:  family history includes Heart Attack in her father, maternal grandfather, paternal uncle, paternal uncle, paternal uncle, and paternal uncle; Heart Surgery (age of onset: 72) in her father; High Cholesterol in her brother and father; Hypertension in her brother and father; Stroke in her mother.    Allergies   Allergen Reactions    Pcn [Penicillins] Hives    Macrobid [Nitrofurantoin Monohyd Macro]     Naproxen Nausea Only    Codeine Rash    Sulfa Antibiotics Rash    Sulfamethazine Rash    Trimethoprim Rash         Objective:   BP (!) 147/74   Pulse 73    Temp 97.8 °F (36.6 °C) (Oral)   Resp 21   Ht 1.6 m (5' 3\")   Wt 65.3 kg (144 lb)   SpO2 94%   BMI 25.51 kg/m²     Intake/Output Summary (Last 24 hours) at 2/2/2025 1408  Last data filed at 2/2/2025 1200  Gross per 24 hour   Intake 950 ml   Output 3890 ml   Net -2940 ml       TELEMETRY:      Physical Exam:  Constitutional:  Well developed, Well nourished, No acute distress, Non-toxic appearance.   HENT:  Normocephalic, Atraumatic, Bilateral external ears normal, Oropharynx moist, No oral exudates, Nose normal. Neck- Normal range of motion, No tenderness, Supple, No stridor.   Eyes:  PERRL, EOMI, Conjunctiva normal, No discharge.   Respiratory:  Normal breath sounds, No respiratory distress, No wheezing, No chest tenderness.,no use of accessory muscles, diaphragm movement is normal  Cardiovascular: (PMI) apex non displaced,no lifts no thrills, no s3,no s4, Normal heart rate, Normal rhythm, No murmurs, No rubs, No gallops. Carotid arteries pulse and amplitude are normal no bruit, no abdominal bruit noted ( normal abdominal aorta ausculation), femoral arteries pulse and amplitude are normal no bruit, pedal pulses are normal  GI:  Bowel sounds normal, Soft, No tenderness, No masses, No pulsatile masses.   : External genitalia appear normal, No masses or lesions. No discharge. No CVA tenderness.   Musculoskeletal:  Intact distal pulses, No edema, No tenderness, No cyanosis, No clubbing. Good range of motion in all major joints. No tenderness to palpation or major deformities noted. Back- No tenderness.   Integument:  Warm, Dry, No erythema, No rash.   Lymphatic:  No lymphadenopathy noted.   Neurologic:  Alert & oriented x 3, Normal motor function, Normal sensory function, No focal deficits noted.   Psychiatric:  Affect normal, Judgment normal, Mood normal.     Medications:    amiodarone  200 mg Oral BID    amLODIPine  2.5 mg Oral Daily    metoprolol tartrate  37.5 mg Oral BID    levothyroxine  125 mcg Oral Daily

## 2025-02-02 NOTE — PROGRESS NOTES
V2.0  [facility] [place] Hospitalist Progress Note      Name:  Rachel Mcdaniel /Age/Sex: 1950  (74 y.o. female)   MRN & CSN:  [id number] & [id number] Encounter Date/Time: 2025 11:41 AM EST    Location:  -A PCP: Arnoldo Wheeler MD       Hospital Day: 7      Subjective:     Chief Complaint:  s/p CABG      Patient seen and examined at bedside. Doing well no new complaints or concerns.   Heparin drip started.   May get cardioversion Monday.  Now on amio PO.   Internal medicine will sign off. Please consult if there is any need.        Assessment and Plan:   CAD. S/p LHC 2025 with triple vessel disease  S/p CABG 2025  - on ASA, plavix (held for now) and statin  - ctx managing  - chest tubes per ctx  - was on amio drip now PO     Essential HTN.  On home lopresor 25 mg BID and amlodipine 5 mg  - resumed but lopressor increased to 37.5 mg BID     Hypothyroidism. On home synthroid 150 mcg. Last TSH 0.03, free T4 2.4 2025.   TSH 0.02 2024  - decreased Synthroid 125 mcg   - check TSH, free T4 in 3-4 weeks      Personally reviewed Lab Studies and Imaging     Drugs that require monitoring for toxicity include lovenox and the method of monitoring was cbc      Diet ADULT DIET; Dysphagia - Soft and Bite Sized; 4 carb choices (60 gm/meal); Low Fat/Low Chol/High Fiber/2 gm Na  ADULT ORAL NUTRITION SUPPLEMENT; Breakfast, AM Snack, Lunch, PM Snack, Dinner; Standard High Calorie/High Protein Oral Supplement   DVT Prophylaxis [x] Lovenox, []  Heparin, [] SCDs, [] Ambulation,  [] Eliquis, [] Xarelto  [] Coumadin   Code Status Full Code   Disposition Per primary   Surrogate Decision Maker/ POA      Review of Systems:    Review of Systems    See above    Objective:     Intake/Output Summary (Last 24 hours) at 2025 1127  Last data filed at 2025 0800  Gross per 24 hour   Intake 600 ml   Output 3140 ml   Net -2540 ml        Vitals:   Vitals:    25 1100   BP: (!) 103/51   Pulse: 86   Resp: 23   Temp:     SpO2: 94%       Physical Exam:     General: NAD  Eyes: EOMI  ENT: neck supple  Cardiovascular: Regular rate.  Respiratory: Clear to auscultation  Gastrointestinal: Soft, non tender  Genitourinary: no suprapubic tenderness  Musculoskeletal: No edema  Skin: warm, dry  Neuro: Alert.  Psych: Mood appropriate.     Medications:   Medications:    amiodarone  200 mg Oral BID    amLODIPine  2.5 mg Oral Daily    metoprolol tartrate  37.5 mg Oral BID    levothyroxine  125 mcg Oral Daily    cefTRIAXone (ROCEPHIN) IV  1,000 mg IntraVENous Q24H    furosemide  40 mg IntraVENous BID    potassium chloride  20 mEq Oral BID WC    lidocaine  2 patch TransDERmal Daily    sodium chloride flush  5-40 mL IntraVENous 2 times per day    latanoprost  1 drop Both Eyes Nightly    sodium chloride flush  5-40 mL IntraVENous 2 times per day    aspirin  81 mg Oral Daily    [Held by provider] clopidogrel  75 mg Oral Daily    chlorhexidine  15 mL Mouth/Throat BID    multivitamin  1 tablet Oral Daily with breakfast    polyethylene glycol  17 g Oral Daily    sennosides-docusate sodium  1 tablet Oral BID    atorvastatin  40 mg Oral Nightly    famotidine  20 mg Oral QHS    Or    famotidine (PEPCID) injection  20 mg IntraVENous QHS    bacitracin   Topical BID    acetaminophen  650 mg Oral Q4H    guaiFENesin  1,200 mg Oral BID      Infusions:    heparin (PORCINE) Infusion 12 Units/kg/hr (02/02/25 0859)    sodium chloride      sodium chloride      norepinephrine Stopped (01/29/25 0029)    niCARdipine Stopped (01/29/25 1234)    dextrose       PRN Meds: heparin (porcine), 60 Units/kg, PRN  heparin (porcine), 30 Units/kg, PRN  sodium chloride flush, 5-40 mL, PRN  sodium chloride, , PRN  ondansetron, 4 mg, Q6H PRN  potassium chloride, 40 mEq, PRN   Or  potassium alternative oral replacement, 40 mEq, PRN   Or  potassium chloride, 10 mEq, PRN  magnesium sulfate, 2,000 mg, PRN  sodium phosphate 15 mmol in sodium chloride 0.9 % 250 mL IVPB, 15 mmol, PRN  sodium

## 2025-02-03 ENCOUNTER — APPOINTMENT (OUTPATIENT)
Dept: GENERAL RADIOLOGY | Age: 75
DRG: 236 | End: 2025-02-03
Attending: THORACIC SURGERY (CARDIOTHORACIC VASCULAR SURGERY)
Payer: MEDICARE

## 2025-02-03 LAB
ACTIVATED CLOTTING TIME, HIGH RANGE: >1005 SEC (ref 81–125)
ANION GAP SERPL CALCULATED.3IONS-SCNC: 14 MMOL/L (ref 9–17)
BUN SERPL-MCNC: 15 MG/DL (ref 7–20)
CA-I BLD-SCNC: 1.16 MMOL/L (ref 1.15–1.33)
CALCIUM SERPL-MCNC: 8.1 MG/DL (ref 8.3–10.6)
CHLORIDE SERPL-SCNC: 106 MMOL/L (ref 99–110)
CO2 SERPL-SCNC: 17 MMOL/L (ref 21–32)
CREAT SERPL-MCNC: 0.5 MG/DL (ref 0.6–1.2)
ERYTHROCYTE [DISTWIDTH] IN BLOOD BY AUTOMATED COUNT: 13.9 % (ref 11.7–14.9)
GFR, ESTIMATED: >90 ML/MIN/1.73M2
GLUCOSE SERPL-MCNC: 157 MG/DL (ref 74–99)
HCT VFR BLD AUTO: 26.8 % (ref 37–47)
HGB BLD-MCNC: 8.2 G/DL (ref 12.5–16)
MAGNESIUM SERPL-MCNC: 2 MG/DL (ref 1.8–2.4)
MCH RBC QN AUTO: 29.4 PG (ref 27–31)
MCHC RBC AUTO-ENTMCNC: 30.6 G/DL (ref 32–36)
MCV RBC AUTO: 96.1 FL (ref 78–100)
PARTIAL THROMBOPLASTIN TIME: 49.2 SEC (ref 25.1–37.1)
PHOSPHATE SERPL-MCNC: 2 MG/DL (ref 2.5–4.9)
PLATELET # BLD AUTO: 310 K/UL (ref 140–440)
PMV BLD AUTO: 10.3 FL (ref 7.5–11.1)
POTASSIUM SERPL-SCNC: 3.7 MMOL/L (ref 3.5–5.1)
RBC # BLD AUTO: 2.79 M/UL (ref 4.2–5.4)
SODIUM SERPL-SCNC: 137 MMOL/L (ref 136–145)
WBC OTHER # BLD: 10.5 K/UL (ref 4–10.5)

## 2025-02-03 PROCEDURE — 6370000000 HC RX 637 (ALT 250 FOR IP): Performed by: PHYSICIAN ASSISTANT

## 2025-02-03 PROCEDURE — 97530 THERAPEUTIC ACTIVITIES: CPT

## 2025-02-03 PROCEDURE — 80048 BASIC METABOLIC PNL TOTAL CA: CPT

## 2025-02-03 PROCEDURE — 94150 VITAL CAPACITY TEST: CPT

## 2025-02-03 PROCEDURE — 2580000003 HC RX 258: Performed by: PHYSICIAN ASSISTANT

## 2025-02-03 PROCEDURE — 71045 X-RAY EXAM CHEST 1 VIEW: CPT

## 2025-02-03 PROCEDURE — 85027 COMPLETE CBC AUTOMATED: CPT

## 2025-02-03 PROCEDURE — 2060000000 HC ICU INTERMEDIATE R&B

## 2025-02-03 PROCEDURE — 2500000003 HC RX 250 WO HCPCS: Performed by: PHYSICIAN ASSISTANT

## 2025-02-03 PROCEDURE — 6370000000 HC RX 637 (ALT 250 FOR IP): Performed by: STUDENT IN AN ORGANIZED HEALTH CARE EDUCATION/TRAINING PROGRAM

## 2025-02-03 PROCEDURE — 83735 ASSAY OF MAGNESIUM: CPT

## 2025-02-03 PROCEDURE — 85730 THROMBOPLASTIN TIME PARTIAL: CPT

## 2025-02-03 PROCEDURE — 82330 ASSAY OF CALCIUM: CPT

## 2025-02-03 PROCEDURE — 84100 ASSAY OF PHOSPHORUS: CPT

## 2025-02-03 PROCEDURE — 6360000002 HC RX W HCPCS: Performed by: THORACIC SURGERY (CARDIOTHORACIC VASCULAR SURGERY)

## 2025-02-03 PROCEDURE — 2500000003 HC RX 250 WO HCPCS: Performed by: THORACIC SURGERY (CARDIOTHORACIC VASCULAR SURGERY)

## 2025-02-03 PROCEDURE — 97116 GAIT TRAINING THERAPY: CPT

## 2025-02-03 PROCEDURE — 99232 SBSQ HOSP IP/OBS MODERATE 35: CPT | Performed by: INTERNAL MEDICINE

## 2025-02-03 PROCEDURE — 6360000002 HC RX W HCPCS: Performed by: PHYSICIAN ASSISTANT

## 2025-02-03 RX ORDER — METOPROLOL TARTRATE 50 MG
50 TABLET ORAL 2 TIMES DAILY
Status: DISCONTINUED | OUTPATIENT
Start: 2025-02-03 | End: 2025-02-05

## 2025-02-03 RX ORDER — METOPROLOL TARTRATE 25 MG/1
12.5 TABLET, FILM COATED ORAL ONCE
Status: COMPLETED | OUTPATIENT
Start: 2025-02-03 | End: 2025-02-03

## 2025-02-03 RX ADMIN — METOPROLOL TARTRATE 37.5 MG: 25 TABLET, FILM COATED ORAL at 09:53

## 2025-02-03 RX ADMIN — METOPROLOL TARTRATE 12.5 MG: 25 TABLET, FILM COATED ORAL at 11:20

## 2025-02-03 RX ADMIN — ACETAMINOPHEN 650 MG: 325 TABLET ORAL at 09:53

## 2025-02-03 RX ADMIN — FUROSEMIDE 40 MG: 10 INJECTION, SOLUTION INTRAMUSCULAR; INTRAVENOUS at 19:51

## 2025-02-03 RX ADMIN — GUAIFENESIN 1200 MG: 600 TABLET ORAL at 21:32

## 2025-02-03 RX ADMIN — METOPROLOL TARTRATE 50 MG: 50 TABLET, FILM COATED ORAL at 21:32

## 2025-02-03 RX ADMIN — POTASSIUM BICARBONATE 20 MEQ: 782 TABLET, EFFERVESCENT ORAL at 21:31

## 2025-02-03 RX ADMIN — AMLODIPINE BESYLATE 2.5 MG: 5 TABLET ORAL at 09:54

## 2025-02-03 RX ADMIN — ASPIRIN 81 MG CHEWABLE TABLET 81 MG: 81 TABLET CHEWABLE at 09:54

## 2025-02-03 RX ADMIN — CHLORHEXIDINE GLUCONATE 0.12% ORAL RINSE 15 ML: 1.2 LIQUID ORAL at 09:52

## 2025-02-03 RX ADMIN — SENNOSIDES AND DOCUSATE SODIUM 1 TABLET: 50; 8.6 TABLET ORAL at 09:54

## 2025-02-03 RX ADMIN — LEVOTHYROXINE SODIUM 125 MCG: 25 TABLET ORAL at 07:54

## 2025-02-03 RX ADMIN — WATER 1000 MG: 1 INJECTION INTRAMUSCULAR; INTRAVENOUS; SUBCUTANEOUS at 07:54

## 2025-02-03 RX ADMIN — SODIUM CHLORIDE, PRESERVATIVE FREE 10 ML: 5 INJECTION INTRAVENOUS at 09:55

## 2025-02-03 RX ADMIN — SODIUM CHLORIDE, PRESERVATIVE FREE 5 ML: 5 INJECTION INTRAVENOUS at 21:00

## 2025-02-03 RX ADMIN — SODIUM PHOSPHATE, MONOBASIC, MONOHYDRATE AND SODIUM PHOSPHATE, DIBASIC, ANHYDROUS 15 MMOL: 142; 276 INJECTION, SOLUTION INTRAVENOUS at 11:01

## 2025-02-03 RX ADMIN — Medication 1 TABLET: at 09:53

## 2025-02-03 RX ADMIN — AMIODARONE HYDROCHLORIDE 200 MG: 200 TABLET ORAL at 21:31

## 2025-02-03 RX ADMIN — CHLORHEXIDINE GLUCONATE 0.12% ORAL RINSE 15 ML: 1.2 LIQUID ORAL at 21:31

## 2025-02-03 RX ADMIN — SODIUM CHLORIDE, PRESERVATIVE FREE 10 ML: 5 INJECTION INTRAVENOUS at 09:56

## 2025-02-03 RX ADMIN — AMIODARONE HYDROCHLORIDE 200 MG: 200 TABLET ORAL at 09:53

## 2025-02-03 RX ADMIN — BACITRACIN: 500 OINTMENT TOPICAL at 10:07

## 2025-02-03 RX ADMIN — ATORVASTATIN CALCIUM 40 MG: 40 TABLET, FILM COATED ORAL at 21:31

## 2025-02-03 RX ADMIN — POTASSIUM BICARBONATE 20 MEQ: 782 TABLET, EFFERVESCENT ORAL at 11:16

## 2025-02-03 RX ADMIN — FAMOTIDINE 20 MG: 20 TABLET, FILM COATED ORAL at 21:32

## 2025-02-03 RX ADMIN — ACETAMINOPHEN 650 MG: 325 TABLET ORAL at 21:31

## 2025-02-03 RX ADMIN — FUROSEMIDE 40 MG: 10 INJECTION, SOLUTION INTRAMUSCULAR; INTRAVENOUS at 09:55

## 2025-02-03 RX ADMIN — BACITRACIN: 500 OINTMENT TOPICAL at 21:00

## 2025-02-03 RX ADMIN — GUAIFENESIN 1200 MG: 600 TABLET ORAL at 11:16

## 2025-02-03 RX ADMIN — ACETAMINOPHEN 650 MG: 325 TABLET ORAL at 02:44

## 2025-02-03 ASSESSMENT — PAIN DESCRIPTION - DESCRIPTORS: DESCRIPTORS: ACHING

## 2025-02-03 ASSESSMENT — PAIN DESCRIPTION - ORIENTATION: ORIENTATION: RIGHT

## 2025-02-03 ASSESSMENT — PAIN SCALES - GENERAL
PAINLEVEL_OUTOF10: 8
PAINLEVEL_OUTOF10: 4

## 2025-02-03 ASSESSMENT — PAIN - FUNCTIONAL ASSESSMENT: PAIN_FUNCTIONAL_ASSESSMENT: ACTIVITIES ARE NOT PREVENTED

## 2025-02-03 ASSESSMENT — PAIN DESCRIPTION - LOCATION: LOCATION: RIB CAGE

## 2025-02-03 NOTE — PROGRESS NOTES
[facility] Cardiothoracic Surgery  Daily Progress Note    Surgeon:  Dr. Byrd       Subjective:  Ms. Mcdaniel is a 74 y.o. year-old female status post CORONARY ARTERY BYPASS GRAFT X; LEFT RADIAL ARTERY-LAD; SVG-DIAG; SVG-OM1; LEFT ENDOSCOPIC GREATER SAPHENOUS VEIN HARVEST; LEFT ENDOSCOPIC RADIAL ARETY HARVEST; LEFT ATRIAL APPENDAGE LIGATION WITH A 45MM ATRICLIP; BELLE;  on 1/28/25.      Patient was seen and examined at bedside this morning without any complaints.  Patient went into atrial fibrillation on 1/30 at 8 AM.  Converted to normal sinus rhythm on 1/31 10:30 AM.  Patient went back into atrial fibrillation on 2/2/2025 at 11:15 AM.  Has not had any A-fib since.  No plan for cardioversion.    Hospital Course:  1/27/25: Patient was brought to the hospital for BELLE.  Direct admitted by Dr. Byrd to preop for surgical intervention.  Preoperative testing complete.  1/28/2025: Patient underwent surgical intervention.  Tolerated the procedure well. Transferred to the CVICU in a stable condition.   1/29/25: Gtts: Cardene at 2.5.  Start Norvasc 2.5 mg daily then discontinue the Cardene 4 hours after.  On 4 L nasal cannula of oxygen.  Wean as tolerated.  Arterial line removed this morning.  Maintain bedrest for 4 hours then okay to get up and work with physical therapy.  Remove [place].  Possibly remove hard mediastinal chest tube after patient walks today.  Maintain transcutaneous pacing wires.  Small mediastinal chest tube fell out in the evening.  Stroke alert was called on the patient due to lethargy and right-sided weakness.  All testing was negative.  1/30/25: Patient went into atrial fibrillation this morning.  Received 2 amiodarone boluses per Dr. Byrd and is on amiodarone drip at 1.  Cordis in place.  Dr. Byrd would like a PICC line and remove Cordis.  Started on Rocephin for 7 days due to increased white blood cell count and possible pneumonia per   of Care: Stepdown  Patient Medically Ready for Discharge? - No     DC Plan: PT and OT consulted.  ARU following.    The above recommendations including medications and orders were discussed and agreed upon with Dr. Bobo Cotter PA-C 02/03/25 10:11 AM        New Consults 8:00AM-4:30PM: Call Office, 4:30PM to 8:00AM Surgeon on-call    CVICU or other units patient follow up: Georgina author of this note 8:00AM-4:30PM    CVICU patient or urgent follow up: 4:30PM to 8:00AM Call or Page Surgeon on-call     Step-down patient follow up: 4:30PM to 8:00AM Page or secure chat PA on-call

## 2025-02-03 NOTE — CARE COORDINATION
Patient approved for ARU.  Will follow for medical stability.  No pre-cert is needed as patients primary insurance is Medicare.

## 2025-02-03 NOTE — PROGRESS NOTES
[facility]  DEPARTMENT OF SPEECH/LANGUAGE PATHOLOGY  ATTEMPT NOTE  Rachel Mcdaniel  2/3/2025  [id number]      Attempted to see Rachel Mcdaniel for dysphagia treatment and diet tolerance monitoring. Pt declining PO trials at this time. SLP team to re-attempt at later time.

## 2025-02-03 NOTE — PROGRESS NOTES
I met with patient in room.  This is POD # 6. I introduced myself to patient as the cardiac rehab nurse, as well as introducing her to the [facility].  I explained to her that this program is a customized out patient program of exercise and education. I explained to the patient that Cardiac Rehab is designed to help improve your hearts future.     I explained to patient that she would not be starting program for six weeks and that the Cardiac Rehab facility would be in contact with her to setup an appointment when it is closer to her release date from restrictions.  Patient had no further questions regarding rehab at this time.

## 2025-02-03 NOTE — PROGRESS NOTES
Today's plan: s/p cabg, left radial artery LAD, svg to diagonal, SVG to OM, FLAKO ligation  Patient is in sinus rhythm, no need for cardioversion continue oral amiodarone and recommend anticoagulation as per CT surgery    Admit Date:  1/27/2025    Subjective: s/p CABG      Chief complaints on admission S/P cabg        History of present illness:Rachel is a 74 y.o.year old who  presents with CABG    Past medical history:    has a past medical history of Allergic rhinitis, Breast cancer (HCC), Depression, Family history of coronary artery disease, Fibroids, Frozen shoulder, GERD (gastroesophageal reflux disease), H/O cardiovascular stress test, H/O chest x-ray, H/O echocardiogram, History of exercise stress test, History of left heart catheterization (LHC), History of transesophageal echocardiography (BELLE), Hx of  Carotid Doppler ultrasound, Hyperlipidemia, Hypertension, Hypothyroidism, and VHD (valvular heart disease).  Past surgical history:   has a past surgical history that includes Mastectomy (11/2005); Hysterectomy (1984); other surgical history (12/12/08); Cholecystectomy (10/24/2007); Breast surgery (6/9/2006); Breast lumpectomy (, 1/18/02); Thyroidectomy; Cardiac procedure (N/A, 1/23/2025); and Coronary artery bypass graft (N/A, 1/28/2025).  Social History:   reports that she has never smoked. She has never used smokeless tobacco. She reports that she does not drink alcohol and does not use drugs.  Family history:  family history includes Heart Attack in her father, maternal grandfather, paternal uncle, paternal uncle, paternal uncle, and paternal uncle; Heart Surgery (age of onset: 72) in her father; High Cholesterol in her brother and father; Hypertension in her brother and father; Stroke in her mother.    Allergies   Allergen Reactions    Pcn [Penicillins] Hives    Macrobid [Nitrofurantoin Monohyd Macro]     Naproxen Nausea Only    Codeine Rash    Sulfa Antibiotics Rash    Sulfamethazine Rash

## 2025-02-03 NOTE — CARE COORDINATION
Chart reviewed. Discussed with Marlen/PA. Pt may be medically ready tomorrow. Getting cardioverted today per cardio team. Coty/ARU aware to expect pt tomorrow for discharge to ARU.

## 2025-02-03 NOTE — PROGRESS NOTES
Occupational Therapy  Occupational Therapy Treatment Note     Name: Rachel Mcdaniel MRN: [id number] :             1950   Date:  2/3/2025   Admission Date: 2025 Room:  -A      Restrictions/Precautions:  General Precautions, High Fall Risk, Sternal Precautions, Chest Tube (x2), Telemetry, Pulse Ox, BP cuff,  Bed/chair alarm      Communication with other providers: RN approved session, co tx with PTA for mobility.      Subjective:  Patient states:  Pt agreeable to therapy session.   Pain:   noted pain 5/10 on lower back      Objective:    Observation: Pt sitting edge of bed upon arrival.    Objective Measures:  Pt alert and oriented  and increased lethargy.     Treatment, including education:  Therapeutic Activity Training:   Therapeutic activity training was instructed today.  Cues were given for safety, sequence, UE/LE placement, awareness, and balance.    Activities performed today included bed mobility training, sup-sit, sit-stand, SPT.     Pt seated edge of bed upon arrival.  Pt seated edge of bed and adjusted socks to pull up with MAX A.  Pt completed sit to stand from edge of bed with Min A x2 with hand held assist and increased verbal and tactile cues for hand placement and noted posterior loss of balance with MOD A x1-2 for controlled decent to edge of bed. Pt completed second sit to stand from edge of bed with hand held assist and CGA to MIN A x1-2 assist.  Pt  completed functional mobility from bed to hallway with CGA -MIN A x2 and seated rest breaks in chair with MIN A x1-2.  Pt returned in room and completed functional transfer back to bed with MIN A x2 and returned supine in bed with MIN A x2. Pt boosted in bed with all needs met and call light in reach bed alarm on.       Assessment / Impression:    Patient's tolerance of treatment: Well  Adverse Reaction: None  Significant change in status and impact: Improved from initial evaluation  Barriers to improvement: None noted        Plan

## 2025-02-03 NOTE — PROGRESS NOTES
Physical Therapy    Physical Therapy Treatment Note  Name: Rachel Mcdaniel MRN: [id number] :   1950   Date:  2/3/2025   Admission Date: 2025 Room:     Restrictions/Precautions:           general precautions, falls, sternal precautions, chest tubes x1, , pacer wires     Communication with other providers:  Hand off with Nurse, Co-Tx with BROWNE for increased functional mobility.     Subjective:  Patient states: Pt. Agreeable to work with therapy.    Pain:  (0/10 to 10/10):  Denies pain   Objective:    Observation: Pt. Up to commode upon arrival to room     Treatment, including education/measures:  Therapeutic Activity Training:   Therapeutic activity training was instructed today.  Cues were given for safety, sequence, UE/LE placement, awareness, and balance.    Activities performed today included sit-stand, standing balance.    Bed mobility: Jordyn x2  Sit to stand transfer: Jordyn  from EOB and commode     Sitting balance:CGA-SBA unsupported in recliner  Standing balance:Jordyn x2 with FWW. Pt. Requires multiple standing rest breaks and verbal cues for posture.     Gait Training:  Cues were given for safety, sequence, device management, balance, posture, awareness, path.    Amb x10ft + 50ft + 50ft, Jordyn x1-2, no AD, chair follow.     Education:   Pt. Educated on importance of out of bed activity, safe functional mobility, and walker management.     Assessment / Impression:    Pt. Left supine in bed at end of session, all needs met, phone and call light in reach, bed/personal alarm active, and nursing updated.     Patient's tolerance of treatment:  Good   Adverse Reaction: none  Significant change in status and impact:  none  Barriers to improvement:  none  Plan for Next Session:    Cont per POC and progress as able     Timed Code Treatment Minutes: 30 Minutes  PT Individual Minutes  Time In: 1022  Time Out: 1052  Minutes: 30              Previously filed items:  Social/Functional History  Lives With:  Son, Family (DIL, grandkids)  Type of Home: House  Home Layout: Multi-level (Bedroom in basement, 13 steps down to basement)  Home Access: Stairs to enter without rails  Entrance Stairs - Number of Steps: 3  Bathroom Shower/Tub: Walk-in shower  Bathroom Equipment: None  Home Equipment: None  Has the patient had two or more falls in the past year or any fall with injury in the past year?: No  Receives Help From: Family  Prior Level of Assist for ADLs: Independent  Prior Level of Assist for Homemaking: Independent  Prior Level of Assist for Ambulation: Independent community ambulator, with or without device  Prior Level of Assist for Transfers: Independent  Active : Yes        Short Term Goals  Time Frame for Short Term Goals: 2 weeks  Short Term Goal 1: Pt will complete supine <> sit Evelina  Short Term Goal 2: Pt will complete sit <> stand Evelina  Short Term Goal 3: Pt will ambulate 200ft with LRAD SBA  Short Term Goal 4: Pt will complete light dynamic standing activity x3 minutes with single UE support, SBA  Short Term Goal 5: Pt will complete 3 steps with single UE support, SBA    Electronically signed by:    Paul Kuo, PTA  2/3/2025, 12:51 PM

## 2025-02-03 NOTE — FLOWSHEET NOTE
Pt educated on appropriate breathing exercises for chest tube removal; pt demonstrated understanding. Site cleansed with betadine; sutures removed intact. Left pleural chest tube removed at this time. Petroleum gauze abd and dry gauze dsg secured by silk cloth tape applied. No complications, pt tolerated well; RR 17, SpO2 98%. Pt to remain on bedrest for two hours per Dr. Broussard.

## 2025-02-03 NOTE — PLAN OF CARE
Problem: ABCDS Injury Assessment  Goal: Absence of physical injury  Outcome: Progressing     Problem: Safety - Adult  Goal: Free from fall injury  Outcome: Progressing     Problem: Discharge Planning  Goal: Discharge to home or other facility with appropriate resources  Outcome: Progressing     Problem: Pain  Goal: Verbalizes/displays adequate comfort level or baseline comfort level  Outcome: Progressing     Problem: Skin/Tissue Integrity  Goal: Skin integrity remains intact  Description: 1.  Monitor for areas of redness and/or skin breakdown  2.  Assess vascular access sites hourly  3.  Every 4-6 hours minimum:  Change oxygen saturation probe site  4.  Every 4-6 hours:  If on nasal continuous positive airway pressure, respiratory therapy assess nares and determine need for appliance change or resting period  Outcome: Progressing  Flowsheets (Taken 2/3/2025 6417)  Skin Integrity Remains Intact:   Monitor for areas of redness and/or skin breakdown   Assess vascular access sites hourly

## 2025-02-04 ENCOUNTER — APPOINTMENT (OUTPATIENT)
Dept: GENERAL RADIOLOGY | Age: 75
DRG: 236 | End: 2025-02-04
Attending: THORACIC SURGERY (CARDIOTHORACIC VASCULAR SURGERY)
Payer: MEDICARE

## 2025-02-04 ENCOUNTER — APPOINTMENT (OUTPATIENT)
Dept: NON INVASIVE DIAGNOSTICS | Age: 75
DRG: 236 | End: 2025-02-04
Attending: THORACIC SURGERY (CARDIOTHORACIC VASCULAR SURGERY)
Payer: MEDICARE

## 2025-02-04 LAB
ANION GAP SERPL CALCULATED.3IONS-SCNC: 9 MMOL/L (ref 9–17)
ANTI-XA UNFRAC HEPARIN: <0.1 IU/L
BUN SERPL-MCNC: 12 MG/DL (ref 7–20)
CA-I BLD-SCNC: 1.17 MMOL/L (ref 1.15–1.33)
CALCIUM SERPL-MCNC: 8.6 MG/DL (ref 8.3–10.6)
CHLORIDE SERPL-SCNC: 104 MMOL/L (ref 99–110)
CO2 SERPL-SCNC: 26 MMOL/L (ref 21–32)
CREAT SERPL-MCNC: 0.5 MG/DL (ref 0.6–1.2)
ERYTHROCYTE [DISTWIDTH] IN BLOOD BY AUTOMATED COUNT: 14.6 % (ref 11.7–14.9)
GFR, ESTIMATED: >90 ML/MIN/1.73M2
GLUCOSE SERPL-MCNC: 129 MG/DL (ref 74–99)
HCT VFR BLD AUTO: 26.7 % (ref 37–47)
HGB BLD-MCNC: 8.4 G/DL (ref 12.5–16)
INR PPP: 1.1
MAGNESIUM SERPL-MCNC: 2.2 MG/DL (ref 1.8–2.4)
MCH RBC QN AUTO: 29.8 PG (ref 27–31)
MCHC RBC AUTO-ENTMCNC: 31.5 G/DL (ref 32–36)
MCV RBC AUTO: 94.7 FL (ref 78–100)
PARTIAL THROMBOPLASTIN TIME: 29 SEC (ref 25.1–37.1)
PHOSPHATE SERPL-MCNC: 2.7 MG/DL (ref 2.5–4.9)
PLATELET # BLD AUTO: 377 K/UL (ref 140–440)
PMV BLD AUTO: 9.9 FL (ref 7.5–11.1)
POTASSIUM SERPL-SCNC: 3.9 MMOL/L (ref 3.5–5.1)
PROTHROMBIN TIME: 14.1 SEC (ref 11.7–14.5)
RBC # BLD AUTO: 2.82 M/UL (ref 4.2–5.4)
SODIUM SERPL-SCNC: 139 MMOL/L (ref 136–145)
WBC OTHER # BLD: 11.4 K/UL (ref 4–10.5)

## 2025-02-04 PROCEDURE — 6370000000 HC RX 637 (ALT 250 FOR IP): Performed by: PHYSICIAN ASSISTANT

## 2025-02-04 PROCEDURE — 80048 BASIC METABOLIC PNL TOTAL CA: CPT

## 2025-02-04 PROCEDURE — 99232 SBSQ HOSP IP/OBS MODERATE 35: CPT | Performed by: INTERNAL MEDICINE

## 2025-02-04 PROCEDURE — 71045 X-RAY EXAM CHEST 1 VIEW: CPT

## 2025-02-04 PROCEDURE — 6370000000 HC RX 637 (ALT 250 FOR IP): Performed by: FAMILY MEDICINE

## 2025-02-04 PROCEDURE — 84100 ASSAY OF PHOSPHORUS: CPT

## 2025-02-04 PROCEDURE — 6360000002 HC RX W HCPCS: Performed by: PHYSICIAN ASSISTANT

## 2025-02-04 PROCEDURE — 83735 ASSAY OF MAGNESIUM: CPT

## 2025-02-04 PROCEDURE — 6370000000 HC RX 637 (ALT 250 FOR IP): Performed by: INTERNAL MEDICINE

## 2025-02-04 PROCEDURE — 82330 ASSAY OF CALCIUM: CPT

## 2025-02-04 PROCEDURE — 85610 PROTHROMBIN TIME: CPT

## 2025-02-04 PROCEDURE — 2580000003 HC RX 258: Performed by: THORACIC SURGERY (CARDIOTHORACIC VASCULAR SURGERY)

## 2025-02-04 PROCEDURE — 2500000003 HC RX 250 WO HCPCS: Performed by: THORACIC SURGERY (CARDIOTHORACIC VASCULAR SURGERY)

## 2025-02-04 PROCEDURE — 2500000003 HC RX 250 WO HCPCS: Performed by: PHYSICIAN ASSISTANT

## 2025-02-04 PROCEDURE — 6370000000 HC RX 637 (ALT 250 FOR IP): Performed by: STUDENT IN AN ORGANIZED HEALTH CARE EDUCATION/TRAINING PROGRAM

## 2025-02-04 PROCEDURE — 85520 HEPARIN ASSAY: CPT

## 2025-02-04 PROCEDURE — 2060000000 HC ICU INTERMEDIATE R&B

## 2025-02-04 PROCEDURE — 85027 COMPLETE CBC AUTOMATED: CPT

## 2025-02-04 PROCEDURE — 85730 THROMBOPLASTIN TIME PARTIAL: CPT

## 2025-02-04 PROCEDURE — 94761 N-INVAS EAR/PLS OXIMETRY MLT: CPT

## 2025-02-04 RX ORDER — HEPARIN SODIUM 1000 [USP'U]/ML
60 INJECTION, SOLUTION INTRAVENOUS; SUBCUTANEOUS PRN
Status: DISCONTINUED | OUTPATIENT
Start: 2025-02-04 | End: 2025-02-04

## 2025-02-04 RX ORDER — HEPARIN SODIUM 10000 [USP'U]/100ML
5-30 INJECTION, SOLUTION INTRAVENOUS CONTINUOUS
Status: DISCONTINUED | OUTPATIENT
Start: 2025-02-04 | End: 2025-02-04 | Stop reason: SDUPTHER

## 2025-02-04 RX ORDER — HEPARIN SODIUM 1000 [USP'U]/ML
30 INJECTION, SOLUTION INTRAVENOUS; SUBCUTANEOUS PRN
Status: DISCONTINUED | OUTPATIENT
Start: 2025-02-04 | End: 2025-02-04

## 2025-02-04 RX ORDER — AMIODARONE HYDROCHLORIDE 200 MG/1
400 TABLET ORAL 2 TIMES DAILY
Status: DISCONTINUED | OUTPATIENT
Start: 2025-02-04 | End: 2025-02-05 | Stop reason: HOSPADM

## 2025-02-04 RX ORDER — HEPARIN SODIUM 10000 [USP'U]/100ML
5-30 INJECTION, SOLUTION INTRAVENOUS CONTINUOUS
Status: DISCONTINUED | OUTPATIENT
Start: 2025-02-04 | End: 2025-02-04

## 2025-02-04 RX ORDER — HEPARIN SODIUM 1000 [USP'U]/ML
60 INJECTION, SOLUTION INTRAVENOUS; SUBCUTANEOUS ONCE
Status: COMPLETED | OUTPATIENT
Start: 2025-02-04 | End: 2025-02-04

## 2025-02-04 RX ORDER — AMIODARONE HYDROCHLORIDE 200 MG/1
400 TABLET ORAL ONCE
Status: COMPLETED | OUTPATIENT
Start: 2025-02-04 | End: 2025-02-04

## 2025-02-04 RX ADMIN — FAMOTIDINE 20 MG: 20 TABLET, FILM COATED ORAL at 20:14

## 2025-02-04 RX ADMIN — LEVOTHYROXINE SODIUM 125 MCG: 25 TABLET ORAL at 06:54

## 2025-02-04 RX ADMIN — ACETAMINOPHEN 650 MG: 325 TABLET ORAL at 09:17

## 2025-02-04 RX ADMIN — ACETAMINOPHEN 650 MG: 325 TABLET ORAL at 18:17

## 2025-02-04 RX ADMIN — AMIODARONE HYDROCHLORIDE 200 MG: 200 TABLET ORAL at 09:16

## 2025-02-04 RX ADMIN — METOPROLOL TARTRATE 50 MG: 50 TABLET, FILM COATED ORAL at 20:12

## 2025-02-04 RX ADMIN — GUAIFENESIN 1200 MG: 600 TABLET ORAL at 09:16

## 2025-02-04 RX ADMIN — Medication 1 TABLET: at 09:38

## 2025-02-04 RX ADMIN — HEPARIN SODIUM 3800 UNITS: 1000 INJECTION INTRAVENOUS; SUBCUTANEOUS at 09:55

## 2025-02-04 RX ADMIN — FUROSEMIDE 40 MG: 10 INJECTION, SOLUTION INTRAMUSCULAR; INTRAVENOUS at 09:16

## 2025-02-04 RX ADMIN — POTASSIUM BICARBONATE 40 MEQ: 782 TABLET, EFFERVESCENT ORAL at 12:21

## 2025-02-04 RX ADMIN — HEPARIN SODIUM 12 UNITS/KG/HR: 10000 INJECTION, SOLUTION INTRAVENOUS at 09:59

## 2025-02-04 RX ADMIN — FUROSEMIDE 40 MG: 10 INJECTION, SOLUTION INTRAMUSCULAR; INTRAVENOUS at 18:17

## 2025-02-04 RX ADMIN — ACETAMINOPHEN 650 MG: 325 TABLET ORAL at 13:57

## 2025-02-04 RX ADMIN — POTASSIUM BICARBONATE 20 MEQ: 782 TABLET, EFFERVESCENT ORAL at 20:15

## 2025-02-04 RX ADMIN — GUAIFENESIN 1200 MG: 600 TABLET ORAL at 20:12

## 2025-02-04 RX ADMIN — BACITRACIN: 500 OINTMENT TOPICAL at 20:16

## 2025-02-04 RX ADMIN — AMIODARONE HYDROCHLORIDE 400 MG: 200 TABLET ORAL at 20:13

## 2025-02-04 RX ADMIN — SODIUM CHLORIDE: 9 INJECTION, SOLUTION INTRAVENOUS at 09:58

## 2025-02-04 RX ADMIN — METOPROLOL TARTRATE 50 MG: 50 TABLET, FILM COATED ORAL at 09:17

## 2025-02-04 RX ADMIN — ACETAMINOPHEN 650 MG: 325 TABLET ORAL at 20:14

## 2025-02-04 RX ADMIN — POTASSIUM BICARBONATE 20 MEQ: 782 TABLET, EFFERVESCENT ORAL at 09:16

## 2025-02-04 RX ADMIN — BACITRACIN: 500 OINTMENT TOPICAL at 10:03

## 2025-02-04 RX ADMIN — ASPIRIN 81 MG CHEWABLE TABLET 81 MG: 81 TABLET CHEWABLE at 09:16

## 2025-02-04 RX ADMIN — SODIUM CHLORIDE, PRESERVATIVE FREE 10 ML: 5 INJECTION INTRAVENOUS at 10:37

## 2025-02-04 RX ADMIN — ALPRAZOLAM 0.25 MG: 0.25 TABLET ORAL at 22:17

## 2025-02-04 RX ADMIN — AMLODIPINE BESYLATE 2.5 MG: 5 TABLET ORAL at 09:17

## 2025-02-04 RX ADMIN — AMIODARONE HYDROCHLORIDE 400 MG: 200 TABLET ORAL at 15:09

## 2025-02-04 RX ADMIN — ATORVASTATIN CALCIUM 40 MG: 40 TABLET, FILM COATED ORAL at 20:15

## 2025-02-04 RX ADMIN — CHLORHEXIDINE GLUCONATE 0.12% ORAL RINSE 15 ML: 1.2 LIQUID ORAL at 09:16

## 2025-02-04 RX ADMIN — SODIUM CHLORIDE, PRESERVATIVE FREE 10 ML: 5 INJECTION INTRAVENOUS at 20:11

## 2025-02-04 RX ADMIN — APIXABAN 2.5 MG: 2.5 TABLET, FILM COATED ORAL at 20:14

## 2025-02-04 ASSESSMENT — PAIN DESCRIPTION - LOCATION: LOCATION: CHEST

## 2025-02-04 ASSESSMENT — PAIN SCALES - GENERAL
PAINLEVEL_OUTOF10: 2
PAINLEVEL_OUTOF10: 0

## 2025-02-04 ASSESSMENT — PAIN - FUNCTIONAL ASSESSMENT
PAIN_FUNCTIONAL_ASSESSMENT: ACTIVITIES ARE NOT PREVENTED

## 2025-02-04 ASSESSMENT — PAIN DESCRIPTION - DESCRIPTORS: DESCRIPTORS: ACHING

## 2025-02-04 ASSESSMENT — PAIN DESCRIPTION - ORIENTATION: ORIENTATION: MID

## 2025-02-04 ASSESSMENT — PAIN DESCRIPTION - PAIN TYPE: TYPE: SURGICAL PAIN

## 2025-02-04 ASSESSMENT — PAIN DESCRIPTION - FREQUENCY: FREQUENCY: CONTINUOUS

## 2025-02-04 ASSESSMENT — PAIN DESCRIPTION - ONSET: ONSET: ON-GOING

## 2025-02-04 NOTE — PROGRESS NOTES
Today's plan: s/p cabg, left radial artery LAD, svg to diagonal, SVG to OM, FLAKO ligation  For BELLE cardioversion today, she also has left small apical pneumothorax, case discussed with Dr. Broussard and CT nick MORENO.    Admit Date:  1/27/2025    Subjective: s/p CABG      Chief complaints on admission S/P cabg        History of present illness:Rachel is a 74 y.o.year old who  presents with CABG    Past medical history:    has a past medical history of Allergic rhinitis, Breast cancer (HCC), Depression, Family history of coronary artery disease, Fibroids, Frozen shoulder, GERD (gastroesophageal reflux disease), H/O cardiovascular stress test, H/O chest x-ray, H/O echocardiogram, History of exercise stress test, History of left heart catheterization (LHC), History of transesophageal echocardiography (BELLE), Hx of  Carotid Doppler ultrasound, Hyperlipidemia, Hypertension, Hypothyroidism, and VHD (valvular heart disease).  Past surgical history:   has a past surgical history that includes Mastectomy (11/2005); Hysterectomy (1984); other surgical history (12/12/08); Cholecystectomy (10/24/2007); Breast surgery (6/9/2006); Breast lumpectomy (, 1/18/02); Thyroidectomy; Cardiac procedure (N/A, 1/23/2025); and Coronary artery bypass graft (N/A, 1/28/2025).  Social History:   reports that she has never smoked. She has never used smokeless tobacco. She reports that she does not drink alcohol and does not use drugs.  Family history:  family history includes Heart Attack in her father, maternal grandfather, paternal uncle, paternal uncle, paternal uncle, and paternal uncle; Heart Surgery (age of onset: 72) in her father; High Cholesterol in her brother and father; Hypertension in her brother and father; Stroke in her mother.    Allergies   Allergen Reactions    Pcn [Penicillins] Hives    Macrobid [Nitrofurantoin Monohyd Macro]     Naproxen Nausea Only    Codeine Rash    Sulfa Antibiotics Rash    Sulfamethazine Rash    Trimethoprim  replacement  20 mEq Oral BID    metoprolol tartrate  50 mg Oral BID    amiodarone  200 mg Oral BID    amLODIPine  2.5 mg Oral Daily    levothyroxine  125 mcg Oral Daily    furosemide  40 mg IntraVENous BID    lidocaine  2 patch TransDERmal Daily    sodium chloride flush  5-40 mL IntraVENous 2 times per day    latanoprost  1 drop Both Eyes Nightly    sodium chloride flush  5-40 mL IntraVENous 2 times per day    aspirin  81 mg Oral Daily    [Held by provider] clopidogrel  75 mg Oral Daily    chlorhexidine  15 mL Mouth/Throat BID    multivitamin  1 tablet Oral Daily with breakfast    polyethylene glycol  17 g Oral Daily    sennosides-docusate sodium  1 tablet Oral BID    atorvastatin  40 mg Oral Nightly    famotidine  20 mg Oral QHS    Or    famotidine (PEPCID) injection  20 mg IntraVENous QHS    bacitracin   Topical BID    acetaminophen  650 mg Oral Q4H    guaiFENesin  1,200 mg Oral BID      heparin (PORCINE) Infusion      sodium chloride      sodium chloride      norepinephrine Stopped (01/29/25 0029)    niCARdipine Stopped (01/29/25 1234)    dextrose       heparin (porcine), heparin (porcine), sodium chloride flush, sodium chloride, ondansetron, potassium chloride **OR** potassium alternative oral replacement **OR** potassium chloride, magnesium sulfate, sodium phosphate 15 mmol in sodium chloride 0.9 % 250 mL IVPB, sodium chloride flush, sodium chloride, bisacodyl, potassium chloride, calcium chloride 1,000 mg in sodium chloride 0.9 % 100 mL IVPB **OR** calcium chloride 2,000 mg in sodium chloride 0.9 % 100 mL IVPB, albumin human 5%, norepinephrine, niCARdipine, glucose, dextrose bolus **OR** dextrose bolus, glucagon (rDNA), dextrose, prochlorperazine, ALPRAZolam    Lab Data:  CBC:   Recent Labs     02/02/25  0545 02/03/25  0400 02/04/25  0615   WBC 14.2* 10.5 11.4*   HGB 8.3* 8.2* 8.4*   HCT 26.4* 26.8* 26.7*   MCV 96.0 96.1 94.7    310 377     BMP:   Recent Labs     02/02/25  0545 02/03/25  0400

## 2025-02-04 NOTE — PLAN OF CARE
Problem: ABCDS Injury Assessment  Goal: Absence of physical injury  2/4/2025 1038 by Sivan Jensen RN  Outcome: Progressing  2/4/2025 0316 by Stephenie Mcdaniel RN  Outcome: Progressing     Problem: Safety - Adult  Goal: Free from fall injury  2/4/2025 1038 by Sivan Jensen RN  Outcome: Progressing  2/4/2025 0316 by Stephenie Mcdaniel RN  Outcome: Progressing     Problem: Discharge Planning  Goal: Discharge to home or other facility with appropriate resources  2/4/2025 1038 by Sivan Jensen RN  Outcome: Progressing  2/4/2025 0316 by Stephenie Mcdaniel RN  Outcome: Progressing     Problem: Pain  Goal: Verbalizes/displays adequate comfort level or baseline comfort level  2/4/2025 1038 by Sivan Jensen RN  Outcome: Progressing  2/4/2025 0316 by Stephenie Mcdaniel RN  Outcome: Progressing     Problem: Skin/Tissue Integrity  Goal: Skin integrity remains intact  Description: 1.  Monitor for areas of redness and/or skin breakdown  2.  Assess vascular access sites hourly  3.  Every 4-6 hours minimum:  Change oxygen saturation probe site  4.  Every 4-6 hours:  If on nasal continuous positive airway pressure, respiratory therapy assess nares and determine need for appliance change or resting period  2/4/2025 1038 by Sivan Jensen RN  Outcome: Progressing  2/4/2025 0316 by Stephenie Mcdaniel RN  Outcome: Progressing

## 2025-02-04 NOTE — FLOWSHEET NOTE
Criteria met per clinical pathway to remove epicardial pacing wires & MD order received. Procedure explained to patient.   Pacing wire site within normal limits. Cleansed site with betadine.  Ventricular pacing wires removed per policy without difficulty.  Dry sterile dressing applied.  Vital signs will be monitored and recorded per open heart protocol (every 15 minutes X 2, every 30 minutes X 1, and every hour X 1).  Patient tolerated procedure well, heart tones easily auscultated, oxygen saturation within normal limits.  Signs/symtoms for cardiac tamponade will be monitored closely.

## 2025-02-04 NOTE — PROGRESS NOTES
Physical Therapy    PT session attempted: Pt. Currently in Afib with plans for BELLE cardioversion later this date. PT will f/u later this date.     Electronically signed by:    Paul Kuo PTA  2/4/2025, 10:55 AM

## 2025-02-04 NOTE — PROGRESS NOTES
[facility] Cardiothoracic Surgery  Daily Progress Note    Surgeon:  Dr. Byrd       Subjective:  Ms. Mcdaniel is a 74 y.o. year-old female status post CORONARY ARTERY BYPASS GRAFT X; LEFT RADIAL ARTERY-LAD; SVG-DIAG; SVG-OM1; LEFT ENDOSCOPIC GREATER SAPHENOUS VEIN HARVEST; LEFT ENDOSCOPIC RADIAL ARETY HARVEST; LEFT ATRIAL APPENDAGE LIGATION WITH A 45MM ATRICLIP; BELLE;  on 1/28/25.      Patient was seen and examined at bedside this morning without any complaints.  Patient went into atrial fibrillation on 1/30 at 8 AM.  Converted to normal sinus rhythm on 1/31 10:30 AM.  Patient went back into atrial fibrillation on 2/2/2025 at 11:15 AM.  Has not had any A-fib since.  No plan for cardioversion.    Hospital Course:  1/27/25: Patient was brought to the hospital for BELLE.  Direct admitted by Dr. Byrd to preop for surgical intervention.  Preoperative testing complete.  1/28/2025: Patient underwent surgical intervention.  Tolerated the procedure well. Transferred to the CVICU in a stable condition.   1/29/25: Gtts: Cardene at 2.5.  Start Norvasc 2.5 mg daily then discontinue the Cardene 4 hours after.  On 4 L nasal cannula of oxygen.  Wean as tolerated.  Arterial line removed this morning.  Maintain bedrest for 4 hours then okay to get up and work with physical therapy.  Remove [place].  Possibly remove hard mediastinal chest tube after patient walks today.  Maintain transcutaneous pacing wires.  Small mediastinal chest tube fell out in the evening.  Stroke alert was called on the patient due to lethargy and right-sided weakness.  All testing was negative.  1/30/25: Patient went into atrial fibrillation this morning.  Received 2 amiodarone boluses per Dr. Byrd and is on amiodarone drip at 1.  Cordis in place.  Dr. Byrd would like a PICC line and remove Cordis.  Started on Rocephin for 7 days due to increased white blood cell count and possible pneumonia per

## 2025-02-04 NOTE — PROGRESS NOTES
@bedside, patient has been sinus rhythm since 1pm. Acknowledged new orders (See MAR). Stopped heparin as ordered and gave amiodarone 400mg po this time.

## 2025-02-04 NOTE — CARE COORDINATION
Cm in to see pt at bedside, introduced self and role of case management. Cm informed pt that she has been accepted to ARU, and can go whenever medically ready. Pt stated that she is no longer medically ready, and will be having another procedure today. Pt stated that she still wants to go to ARU whenever medically ready. No other questions or needs stated at this time. Cm to follow.

## 2025-02-04 NOTE — PROGRESS NOTES
[facility]  DEPARTMENT OF SPEECH/LANGUAGE PATHOLOGY  ATTEMPT NOTE  Rachel Mcdaniel  2/4/2025  [id number]      Attempted to see Rachel Mcdaniel for dysphagia treatment and diet tolerance monitoring. Pt NPO for procedure scheduled later this afternoon. SLP team to re-attempt at later time.

## 2025-02-05 ENCOUNTER — APPOINTMENT (OUTPATIENT)
Dept: GENERAL RADIOLOGY | Age: 75
DRG: 236 | End: 2025-02-05
Attending: THORACIC SURGERY (CARDIOTHORACIC VASCULAR SURGERY)
Payer: MEDICARE

## 2025-02-05 ENCOUNTER — TELEPHONE (OUTPATIENT)
Dept: CARDIOTHORACIC SURGERY | Age: 75
End: 2025-02-05

## 2025-02-05 ENCOUNTER — HOSPITAL ENCOUNTER (INPATIENT)
Age: 75
LOS: 9 days | Discharge: HOME HEALTH | DRG: 316 | End: 2025-02-14
Attending: PHYSICAL MEDICINE & REHABILITATION | Admitting: PHYSICAL MEDICINE & REHABILITATION
Payer: MEDICARE

## 2025-02-05 VITALS
DIASTOLIC BLOOD PRESSURE: 67 MMHG | RESPIRATION RATE: 19 BRPM | HEIGHT: 63 IN | BODY MASS INDEX: 24.49 KG/M2 | WEIGHT: 138.2 LBS | OXYGEN SATURATION: 97 % | TEMPERATURE: 98.2 F | HEART RATE: 71 BPM | SYSTOLIC BLOOD PRESSURE: 95 MMHG

## 2025-02-05 DIAGNOSIS — I38 VHD (VALVULAR HEART DISEASE): Primary | ICD-10-CM

## 2025-02-05 PROBLEM — I97.130 CHF FOLLOWING CARDIAC SURGERY, POSTOP: Status: ACTIVE | Noted: 2025-02-05

## 2025-02-05 LAB
ANION GAP SERPL CALCULATED.3IONS-SCNC: 9 MMOL/L (ref 9–17)
BUN SERPL-MCNC: 13 MG/DL (ref 7–20)
CA-I BLD-SCNC: 1.17 MMOL/L (ref 1.15–1.33)
CALCIUM SERPL-MCNC: 8.3 MG/DL (ref 8.3–10.6)
CHLORIDE SERPL-SCNC: 102 MMOL/L (ref 99–110)
CO2 SERPL-SCNC: 26 MMOL/L (ref 21–32)
CREAT SERPL-MCNC: 0.6 MG/DL (ref 0.6–1.2)
ERYTHROCYTE [DISTWIDTH] IN BLOOD BY AUTOMATED COUNT: 14.8 % (ref 11.7–14.9)
GFR, ESTIMATED: >90 ML/MIN/1.73M2
GLUCOSE SERPL-MCNC: 113 MG/DL (ref 74–99)
HCT VFR BLD AUTO: 26.8 % (ref 37–47)
HGB BLD-MCNC: 8.3 G/DL (ref 12.5–16)
MAGNESIUM SERPL-MCNC: 2.1 MG/DL (ref 1.8–2.4)
MCH RBC QN AUTO: 29.4 PG (ref 27–31)
MCHC RBC AUTO-ENTMCNC: 31 G/DL (ref 32–36)
MCV RBC AUTO: 95 FL (ref 78–100)
PHOSPHATE SERPL-MCNC: 3.5 MG/DL (ref 2.5–4.9)
PLATELET # BLD AUTO: 394 K/UL (ref 140–440)
PMV BLD AUTO: 9.9 FL (ref 7.5–11.1)
POTASSIUM SERPL-SCNC: 3.9 MMOL/L (ref 3.5–5.1)
RBC # BLD AUTO: 2.82 M/UL (ref 4.2–5.4)
SODIUM SERPL-SCNC: 137 MMOL/L (ref 136–145)
WBC OTHER # BLD: 14.4 K/UL (ref 4–10.5)

## 2025-02-05 PROCEDURE — 6370000000 HC RX 637 (ALT 250 FOR IP): Performed by: STUDENT IN AN ORGANIZED HEALTH CARE EDUCATION/TRAINING PROGRAM

## 2025-02-05 PROCEDURE — 84100 ASSAY OF PHOSPHORUS: CPT

## 2025-02-05 PROCEDURE — 71045 X-RAY EXAM CHEST 1 VIEW: CPT

## 2025-02-05 PROCEDURE — 99223 1ST HOSP IP/OBS HIGH 75: CPT | Performed by: PHYSICAL MEDICINE & REHABILITATION

## 2025-02-05 PROCEDURE — 85027 COMPLETE CBC AUTOMATED: CPT

## 2025-02-05 PROCEDURE — 94761 N-INVAS EAR/PLS OXIMETRY MLT: CPT

## 2025-02-05 PROCEDURE — 6370000000 HC RX 637 (ALT 250 FOR IP): Performed by: PHYSICIAN ASSISTANT

## 2025-02-05 PROCEDURE — 97530 THERAPEUTIC ACTIVITIES: CPT

## 2025-02-05 PROCEDURE — 1280000000 HC REHAB R&B

## 2025-02-05 PROCEDURE — 6360000002 HC RX W HCPCS: Performed by: PHYSICIAN ASSISTANT

## 2025-02-05 PROCEDURE — 94150 VITAL CAPACITY TEST: CPT

## 2025-02-05 PROCEDURE — 83735 ASSAY OF MAGNESIUM: CPT

## 2025-02-05 PROCEDURE — 2500000003 HC RX 250 WO HCPCS: Performed by: PHYSICIAN ASSISTANT

## 2025-02-05 PROCEDURE — 97535 SELF CARE MNGMENT TRAINING: CPT

## 2025-02-05 PROCEDURE — 82330 ASSAY OF CALCIUM: CPT

## 2025-02-05 PROCEDURE — 2500000003 HC RX 250 WO HCPCS: Performed by: THORACIC SURGERY (CARDIOTHORACIC VASCULAR SURGERY)

## 2025-02-05 PROCEDURE — 6370000000 HC RX 637 (ALT 250 FOR IP): Performed by: PHYSICAL MEDICINE & REHABILITATION

## 2025-02-05 PROCEDURE — 80048 BASIC METABOLIC PNL TOTAL CA: CPT

## 2025-02-05 PROCEDURE — 97116 GAIT TRAINING THERAPY: CPT

## 2025-02-05 PROCEDURE — 6370000000 HC RX 637 (ALT 250 FOR IP): Performed by: THORACIC SURGERY (CARDIOTHORACIC VASCULAR SURGERY)

## 2025-02-05 PROCEDURE — 6370000000 HC RX 637 (ALT 250 FOR IP): Performed by: INTERNAL MEDICINE

## 2025-02-05 RX ORDER — GUAIFENESIN 600 MG/1
1200 TABLET, EXTENDED RELEASE ORAL 2 TIMES DAILY
Status: CANCELLED | OUTPATIENT
Start: 2025-02-05

## 2025-02-05 RX ORDER — ALPRAZOLAM 0.5 MG
0.25 TABLET ORAL 3 TIMES DAILY PRN
Status: DISCONTINUED | OUTPATIENT
Start: 2025-02-05 | End: 2025-02-14 | Stop reason: HOSPADM

## 2025-02-05 RX ORDER — GINSENG 100 MG
CAPSULE ORAL 2 TIMES DAILY
Status: CANCELLED | OUTPATIENT
Start: 2025-02-05

## 2025-02-05 RX ORDER — BISACODYL 10 MG
10 SUPPOSITORY, RECTAL RECTAL DAILY PRN
Status: DISCONTINUED | OUTPATIENT
Start: 2025-02-05 | End: 2025-02-14 | Stop reason: HOSPADM

## 2025-02-05 RX ORDER — LATANOPROST 50 UG/ML
1 SOLUTION/ DROPS OPHTHALMIC NIGHTLY
Status: DISCONTINUED | OUTPATIENT
Start: 2025-02-05 | End: 2025-02-14 | Stop reason: HOSPADM

## 2025-02-05 RX ORDER — BISACODYL 10 MG
10 SUPPOSITORY, RECTAL RECTAL DAILY PRN
Status: CANCELLED | OUTPATIENT
Start: 2025-02-05

## 2025-02-05 RX ORDER — AMLODIPINE BESYLATE 5 MG/1
2.5 TABLET ORAL DAILY
Status: CANCELLED | OUTPATIENT
Start: 2025-02-06

## 2025-02-05 RX ORDER — AMIODARONE HYDROCHLORIDE 200 MG/1
200 TABLET ORAL DAILY
Status: CANCELLED | OUTPATIENT
Start: 2025-02-11

## 2025-02-05 RX ORDER — ASPIRIN 81 MG/1
81 TABLET, CHEWABLE ORAL DAILY
Status: DISCONTINUED | OUTPATIENT
Start: 2025-02-06 | End: 2025-02-14 | Stop reason: HOSPADM

## 2025-02-05 RX ORDER — AMIODARONE HYDROCHLORIDE 200 MG/1
200 TABLET ORAL DAILY
Status: DISCONTINUED | OUTPATIENT
Start: 2025-02-11 | End: 2025-02-14 | Stop reason: HOSPADM

## 2025-02-05 RX ORDER — AMIODARONE HYDROCHLORIDE 200 MG/1
400 TABLET ORAL 2 TIMES DAILY
Status: CANCELLED | OUTPATIENT
Start: 2025-02-05 | End: 2025-02-10

## 2025-02-05 RX ORDER — AMIODARONE HYDROCHLORIDE 200 MG/1
200 TABLET ORAL DAILY
Status: DISCONTINUED | OUTPATIENT
Start: 2025-02-11 | End: 2025-02-05 | Stop reason: HOSPADM

## 2025-02-05 RX ORDER — ACETAMINOPHEN 325 MG/1
650 TABLET ORAL
Status: DISCONTINUED | OUTPATIENT
Start: 2025-02-05 | End: 2025-02-14 | Stop reason: HOSPADM

## 2025-02-05 RX ORDER — GINSENG 100 MG
CAPSULE ORAL 2 TIMES DAILY
Status: DISCONTINUED | OUTPATIENT
Start: 2025-02-05 | End: 2025-02-14 | Stop reason: HOSPADM

## 2025-02-05 RX ORDER — LEVOTHYROXINE SODIUM 125 UG/1
125 TABLET ORAL DAILY
Status: DISCONTINUED | OUTPATIENT
Start: 2025-02-06 | End: 2025-02-14 | Stop reason: HOSPADM

## 2025-02-05 RX ORDER — AMIODARONE HYDROCHLORIDE 200 MG/1
400 TABLET ORAL 2 TIMES DAILY
Status: COMPLETED | OUTPATIENT
Start: 2025-02-05 | End: 2025-02-10

## 2025-02-05 RX ORDER — FUROSEMIDE 40 MG/1
40 TABLET ORAL DAILY
Status: DISCONTINUED | OUTPATIENT
Start: 2025-02-06 | End: 2025-02-05 | Stop reason: HOSPADM

## 2025-02-05 RX ORDER — ATORVASTATIN CALCIUM 40 MG/1
40 TABLET, FILM COATED ORAL NIGHTLY
Status: DISCONTINUED | OUTPATIENT
Start: 2025-02-05 | End: 2025-02-14 | Stop reason: HOSPADM

## 2025-02-05 RX ORDER — AMLODIPINE BESYLATE 5 MG/1
2.5 TABLET ORAL DAILY
Status: DISCONTINUED | OUTPATIENT
Start: 2025-02-06 | End: 2025-02-14 | Stop reason: HOSPADM

## 2025-02-05 RX ORDER — LIDOCAINE 4 G/G
2 PATCH TOPICAL DAILY PRN
Status: CANCELLED | OUTPATIENT
Start: 2025-02-05

## 2025-02-05 RX ORDER — GUAIFENESIN 600 MG/1
1200 TABLET, EXTENDED RELEASE ORAL 2 TIMES DAILY
Status: DISCONTINUED | OUTPATIENT
Start: 2025-02-05 | End: 2025-02-13

## 2025-02-05 RX ORDER — LIDOCAINE 4 G/G
2 PATCH TOPICAL DAILY PRN
Status: DISCONTINUED | OUTPATIENT
Start: 2025-02-05 | End: 2025-02-14 | Stop reason: HOSPADM

## 2025-02-05 RX ORDER — ASPIRIN 81 MG/1
81 TABLET, CHEWABLE ORAL DAILY
Status: CANCELLED | OUTPATIENT
Start: 2025-02-06

## 2025-02-05 RX ORDER — ATORVASTATIN CALCIUM 40 MG/1
40 TABLET, FILM COATED ORAL NIGHTLY
Status: CANCELLED | OUTPATIENT
Start: 2025-02-05

## 2025-02-05 RX ORDER — FUROSEMIDE 40 MG/1
40 TABLET ORAL DAILY
Status: DISCONTINUED | OUTPATIENT
Start: 2025-02-06 | End: 2025-02-14 | Stop reason: HOSPADM

## 2025-02-05 RX ORDER — FUROSEMIDE 40 MG/1
40 TABLET ORAL DAILY
Status: CANCELLED | OUTPATIENT
Start: 2025-02-06

## 2025-02-05 RX ORDER — LATANOPROST 50 UG/ML
1 SOLUTION/ DROPS OPHTHALMIC NIGHTLY
Status: CANCELLED | OUTPATIENT
Start: 2025-02-05

## 2025-02-05 RX ADMIN — ACETAMINOPHEN 650 MG: 325 TABLET ORAL at 21:52

## 2025-02-05 RX ADMIN — SENNOSIDES AND DOCUSATE SODIUM 1 TABLET: 50; 8.6 TABLET ORAL at 08:08

## 2025-02-05 RX ADMIN — ACETAMINOPHEN 650 MG: 325 TABLET ORAL at 08:09

## 2025-02-05 RX ADMIN — METOPROLOL TARTRATE 75 MG: 50 TABLET, FILM COATED ORAL at 21:51

## 2025-02-05 RX ADMIN — BACITRACIN: 500 OINTMENT TOPICAL at 08:16

## 2025-02-05 RX ADMIN — APIXABAN 5 MG: 5 TABLET, FILM COATED ORAL at 21:50

## 2025-02-05 RX ADMIN — GUAIFENESIN 1200 MG: 600 TABLET, EXTENDED RELEASE ORAL at 21:52

## 2025-02-05 RX ADMIN — BACITRACIN: 500 OINTMENT TOPICAL at 21:56

## 2025-02-05 RX ADMIN — APIXABAN 5 MG: 5 TABLET, FILM COATED ORAL at 08:41

## 2025-02-05 RX ADMIN — ASPIRIN 81 MG CHEWABLE TABLET 81 MG: 81 TABLET CHEWABLE at 08:11

## 2025-02-05 RX ADMIN — METOPROLOL TARTRATE 75 MG: 50 TABLET, FILM COATED ORAL at 08:41

## 2025-02-05 RX ADMIN — LATANOPROST 1 DROP: 50 SOLUTION OPHTHALMIC at 21:56

## 2025-02-05 RX ADMIN — ATORVASTATIN CALCIUM 40 MG: 40 TABLET, FILM COATED ORAL at 21:51

## 2025-02-05 RX ADMIN — ALPRAZOLAM 0.25 MG: 0.5 TABLET ORAL at 21:52

## 2025-02-05 RX ADMIN — ACETAMINOPHEN 650 MG: 325 TABLET ORAL at 03:42

## 2025-02-05 RX ADMIN — SODIUM CHLORIDE, PRESERVATIVE FREE 10 ML: 5 INJECTION INTRAVENOUS at 08:16

## 2025-02-05 RX ADMIN — LEVOTHYROXINE SODIUM 125 MCG: 25 TABLET ORAL at 05:22

## 2025-02-05 RX ADMIN — SODIUM CHLORIDE, PRESERVATIVE FREE 10 ML: 5 INJECTION INTRAVENOUS at 08:21

## 2025-02-05 RX ADMIN — CHLORHEXIDINE GLUCONATE 0.12% ORAL RINSE 15 ML: 1.2 LIQUID ORAL at 08:15

## 2025-02-05 RX ADMIN — ACETAMINOPHEN 650 MG: 325 TABLET ORAL at 12:00

## 2025-02-05 RX ADMIN — GUAIFENESIN 1200 MG: 600 TABLET ORAL at 08:14

## 2025-02-05 RX ADMIN — AMLODIPINE BESYLATE 2.5 MG: 5 TABLET ORAL at 08:11

## 2025-02-05 RX ADMIN — FUROSEMIDE 40 MG: 10 INJECTION, SOLUTION INTRAMUSCULAR; INTRAVENOUS at 08:16

## 2025-02-05 RX ADMIN — Medication 1 TABLET: at 08:08

## 2025-02-05 RX ADMIN — AMIODARONE HYDROCHLORIDE 400 MG: 200 TABLET ORAL at 08:13

## 2025-02-05 RX ADMIN — AMIODARONE HYDROCHLORIDE 400 MG: 200 TABLET ORAL at 21:50

## 2025-02-05 RX ADMIN — POTASSIUM BICARBONATE 20 MEQ: 782 TABLET, EFFERVESCENT ORAL at 08:07

## 2025-02-05 ASSESSMENT — PAIN SCALES - WONG BAKER
WONGBAKER_NUMERICALRESPONSE: NO HURT
WONGBAKER_NUMERICALRESPONSE: HURTS LITTLE MORE
WONGBAKER_NUMERICALRESPONSE: NO HURT

## 2025-02-05 ASSESSMENT — PAIN SCALES - GENERAL
PAINLEVEL_OUTOF10: 3
PAINLEVEL_OUTOF10: 0
PAINLEVEL_OUTOF10: 2
PAINLEVEL_OUTOF10: 0

## 2025-02-05 ASSESSMENT — PAIN DESCRIPTION - PAIN TYPE: TYPE: SURGICAL PAIN

## 2025-02-05 ASSESSMENT — PAIN DESCRIPTION - FREQUENCY: FREQUENCY: INTERMITTENT

## 2025-02-05 ASSESSMENT — PAIN - FUNCTIONAL ASSESSMENT
PAIN_FUNCTIONAL_ASSESSMENT: ACTIVITIES ARE NOT PREVENTED
PAIN_FUNCTIONAL_ASSESSMENT: ACTIVITIES ARE NOT PREVENTED

## 2025-02-05 ASSESSMENT — PAIN DESCRIPTION - ONSET: ONSET: ON-GOING

## 2025-02-05 ASSESSMENT — PAIN DESCRIPTION - DESCRIPTORS
DESCRIPTORS: DISCOMFORT;ACHING
DESCRIPTORS: ACHING

## 2025-02-05 ASSESSMENT — PAIN DESCRIPTION - LOCATION
LOCATION: WRIST
LOCATION: HAND;WRIST

## 2025-02-05 ASSESSMENT — PAIN DESCRIPTION - ORIENTATION
ORIENTATION: LEFT
ORIENTATION: RIGHT

## 2025-02-05 NOTE — TELEPHONE ENCOUNTER
Pending Prescriptions:                       Disp   Refills    warfarin (COUMADIN) 5 MG tablet [Pharmacy*30 tab*0            Sig: TAKE 0.5 TABLET BY MOUTH DAILY ON MON AND THUR.           TAKE 1 TABLET BY MOUTH DAILY ALL OTHER DAYS.    losartan (COZAAR) 50 MG tablet [Pharmacy *30 tab*0            Sig: TAKE 1 TABLET BY MOUTH EVERY DAY    CER please review for LES    Pt saw you last on 11- per notes rtc in 4 weeks?  Meds refilled for 6 months DUE for ov in May  Please fax 30 and send to [facility], for a non fasting ov  Bindu  [phone] (home) [phone] (work)     Per JOSH Mayorga- Rachel Mcdaniel \"Salima\"    Rachel Mcdaniel (Legal Name)   74 y.o., 1950      needs f/u with [facility] on 2/20 s/p CABG with CXR/labs. also needs TTE on blood thinners. Clari if you could order.     Appointment scheduled on 2/20/2025 at 1240. Marlen is aware.

## 2025-02-05 NOTE — CARE COORDINATION
CM spoke with Marlen/PA. Plan discharge to ARU. Per Coty/ARU note, requested PT/OT prior to discharge to ARU.

## 2025-02-05 NOTE — DISCHARGE SUMMARY
Cardiothoracic and Vascular Surgery Discharge Summary  Today's Date: 25  Name:  Rachel Mcdaniel /Age/Sex: 1950  (74 y.o. female)   MRN & CSN:  [id number] & [id number] Admission Date/Time: 2025 12:41 PM   Location:   PCP: Arnoldo Wheeler MD       Admit date: 2025   Discharge date: 2025      Admitting Provider: Tunde Byrd MD   Discharge Provider: Marlen Cotter PA-C     Discharge Diagnoses:   Active Hospital Problems    Diagnosis Date Noted    Coronary artery disease (CAD) excluded [Z03.89] 2025    Coronary artery disease involving native heart with unstable angina pectoris (HCC) [I25.110] 2025     Discharged Condition: stable.    Operation    S/p CORONARY ARTERY BYPASS GRAFT X; LEFT RADIAL ARTERY-LAD; SVG-DIAG; SVG-OM1; LEFT ENDOSCOPIC GREATER SAPHENOUS VEIN HARVEST; LEFT ENDOSCOPIC RADIAL ARETY HARVEST; LEFT ATRIAL APPENDAGE LIGATION WITH A 45MM ATRICLIP; BELLE;  on 25.      Hospital Course:  25: Patient was brought to the hospital for BELLE.  Direct admitted by Dr. Byrd to preop for surgical intervention.  Preoperative testing complete.  2025: Patient underwent surgical intervention.  Tolerated the procedure well. Transferred to the CVICU in a stable condition.   25: Gtts: Cardene at 2.5.  Start Norvasc 2.5 mg daily then discontinue the Cardene 4 hours after.  On 4 L nasal cannula of oxygen.  Wean as tolerated.  Arterial line removed this morning.  Maintain bedrest for 4 hours then okay to get up and work with physical therapy.  Remove Mathis.  Possibly remove hard mediastinal chest tube after patient walks today.  Maintain transcutaneous pacing wires.  Small mediastinal chest tube fell out in the evening.  Stroke alert was called on the patient due to lethargy and right-sided weakness.  All testing was negative.  25: Patient went into atrial fibrillation this morning.  Received 2 amiodarone boluses per Dr. Byrd and

## 2025-02-05 NOTE — PLAN OF CARE
Problem: ABCDS Injury Assessment  Goal: Absence of physical injury  2/5/2025 0052 by Jerrell Brizuela RN  Outcome: Progressing  Flowsheets (Taken 2/4/2025 2000)  Absence of Physical Injury: Implement safety measures based on patient assessment  2/4/2025 1126 by Sivan Jensen RN  Outcome: Progressing     Problem: Safety - Adult  Goal: Free from fall injury  2/5/2025 0052 by Jerrell Brizuela RN  Outcome: Progressing  Flowsheets (Taken 2/4/2025 2000)  Free From Fall Injury: Instruct family/caregiver on patient safety  2/4/2025 1126 by Sivan Jensen RN  Outcome: Progressing     Problem: Discharge Planning  Goal: Discharge to home or other facility with appropriate resources  2/5/2025 0052 by Jerrell Brizuela RN  Outcome: Progressing  Flowsheets (Taken 2/4/2025 2000)  Discharge to home or other facility with appropriate resources: Identify barriers to discharge with patient and caregiver  2/4/2025 1126 by Sivan Jensen RN  Outcome: Progressing     Problem: Pain  Goal: Verbalizes/displays adequate comfort level or baseline comfort level  2/5/2025 0052 by Jerrell Brizuela RN  Outcome: Progressing  2/4/2025 1126 by Sivan Jensen RN  Outcome: Progressing     Problem: Skin/Tissue Integrity  Goal: Skin integrity remains intact  Description: 1.  Monitor for areas of redness and/or skin breakdown  2.  Assess vascular access sites hourly  3.  Every 4-6 hours minimum:  Change oxygen saturation probe site  4.  Every 4-6 hours:  If on nasal continuous positive airway pressure, respiratory therapy assess nares and determine need for appliance change or resting period  2/5/2025 0052 by Jerrell Brizuela RN  Outcome: Progressing  Flowsheets (Taken 2/4/2025 2000)  Skin Integrity Remains Intact: Monitor for areas of redness and/or skin breakdown  2/4/2025 1126 by Sivan Jensen RN  Outcome: Progressing

## 2025-02-05 NOTE — CARE COORDINATION
Per PA patient will be medically ready for discharge today.      Met with patient and reiterated ARU criteria.  Per patient her plan is still to admit to ARU with goal of returning home at discharge from ARU.     Patient meets criteria and is approved to come to ARU.   Patient able to admit once medically stable and after ARU Medical Director and  sign the pre-admission screen (PAS).

## 2025-02-05 NOTE — PROGRESS NOTES
ARU Admission Assessment - [facility]      A Complete drug regimen review was completed for this patient this date.   [x]  No clinically significant medication issue was identified   []  Yes, a clinically significant medication issue was identified     []  Adverse Drug Event:    []  Allergy:    []  Side Effect:    []  Ineffective Therapy:    []  Drug interaction:     []  Duplicate Therapy:    []  Untreated Indication:    []  Non-adherence:    []  Other:  Nursing contacted the physician:       Date:                Time:    Actions recommended by physician were completed:   Date:                 Time:  Action(s) Taken:             []  New Physician Order Received    []  Issue Noted by Physician; However No Action Required    []  Other:      *NEW QUESTION EFFECTIVE OCTOBER 1, 2024 as required by Medicare    COVID Vaccination  Is the patient up-to-date with their COVID vaccination?  *See CDC Guidelines Below      https://www.cdc.gov/covid/vaccines/stay-up-to-date.html    [x] A.  No, patient is NOT up to date.  Includes if they have not received the vaccine for ANY reason, including medical or Anglican reason.   Encourage patient to receive this after discharge.     [] B.  Yes, patient is up to date.       People ages 12 years and older Guidelines for Up to Date  *You are up to date when you have received:  1 dose of the 3688-7230 Moderna COVID-19 vaccine OR  1 dose of the 7686-2698 Pfizer-BioNTech COVID-19 vaccine OR  1 dose of the 0909-6937 Novavax vaccine unless you are receiving a COVID-19 vaccine for the very first time. If you have never received any COVID-19 vaccine and you choose to get Novavax, you need 2 doses of 9975-2778 Novavax COVID-19 vaccine to be up to date.    Information may be determined from medical record, interviews with the patient and/or family members or other healthcare providers but must meet criteria above.      Ethnicity  \"Are you of , /a, or Citizen of the [place] origin?\"  Check all  1.  Rarely or not at all  []  2.  Occasionally  []  3.  Frequently  []  4.  Almost constantly  []  8.  Unable to answer    Pain Interference with Day-to-Day Activities:  \"Over the past 5 days, how often have you limited your day-to-day activities (excluding rehabilitation therapy session)?\"  [x]  1.  Rarely or not at all  []  2.  Occasionally  []  3.  Frequently  []  4.  Almost constantly  []  8.  Unable to answer                                                   Confirm you completed the new COVID Vaccination question at the top of this document before signing

## 2025-02-05 NOTE — PROGRESS NOTES
Physical Therapy    Physical Therapy Treatment Note  Name: Rachel Mcdaniel MRN: [id number] :   1950   Date:  2025   Admission Date: 2025 Room:     Restrictions/Precautions:           general precautions, falls, sternal precautions   Communication with other providers:  Hand off with Nurse    Subjective:  Patient states: \"Can you bring the chair just in case\"   Pain:  (0/10 to 10/10):  2-3/10 pain   Objective:    Observation: Pt. Up in recliner upon arrival to room   Objective Measures:  Room air   Treatment, including education/measures:  Therapeutic Activity Training:   Therapeutic activity training was instructed today.  Cues were given for safety, sequence, UE/LE placement, awareness, and balance.    Activities performed today included bed mobility training, sup-sit, sit-stand.    Bed Mobility: DNT, OOB pre/post session   Sit to stand transfer: Pt. Requires multiple attempts from recliner, able to complete with CGA after 3 attempts.     Sitting balance:SBA   Standing balance:CGA with FWW     Gait Training:  Cues were given for safety, sequence, device management, balance, posture, awareness, path.    Amb 100ft + 2x50ft , CGA, FWW. Pt. Requires standing rest breaks. Pt. With decreased kendra.     Education:   Pt. Educated on importance of out of bed activity, safe functional mobility, and sternal precautions     Assessment / Impression:    Pt. Left up in recliner at end of session, all needs met, phone and call light in reach, bed/personal alarm active, and nursing updated.     Patient's tolerance of treatment:  Good   Adverse Reaction: none  Significant change in status and impact:  none  Barriers to improvement:  none  Plan for Next Session:    Cont per POC and progress as able    Timed Code Treatment Minutes: 23 Minutes  PT Individual Minutes  Time In: 09  Time Out: 1015  Minutes: 23              Previously filed items:  Social/Functional History  Lives With: Son, Family (DIL,

## 2025-02-05 NOTE — PROGRESS NOTES
Jennifer RODRIGUESN RN clinical  for [facility] RN students 07-19:00 this date.

## 2025-02-05 NOTE — PROGRESS NOTES
Spiritual Health History and Assessment/Progress Note  [facility]    Spiritual/Emotional Needs,  ,  ,      Name: Rachel Mcdaniel MRN: [id number]    Age: 74 y.o.     Sex: female   Language: English   Taoist: Scientology   Coronary artery disease (CAD) excluded     Date: 2/5/2025            Total Time Calculated: 13 min              Spiritual Assessment continued in [facility] CVICU        Referral/Consult From: Rounding   Encounter Overview/Reason: Spiritual/Emotional Needs  Service Provided For: Patient    Lili, Belief, Meaning:   Patient identifies as spiritual, is connected with a lili tradition or spiritual practice, and has beliefs or practices that help with coping during difficult times  Family/Friends identify as spiritual, are connected with a lili tradition or spiritual practice, and have beliefs or practices that help with coping during difficult times      Importance and Influence:  Patient has spiritual/personal beliefs that influence decisions regarding their health  Family/Friends have spiritual/personal beliefs that influence decisions regarding the patient's health    Community:  Patient is connected with a spiritual community and feels well-supported. Support system includes: Children, Lili Community, Friends, and Extended family  Family/Friends are connected with a spiritual community: and feel well-supported. Support system includes: Spouse/Partner, Parent/s, Children, Lili Community, Friends, and Extended family    Assessment and Plan of Care:     Patient Interventions include: Facilitated expression of thoughts and feelings  Family/Friends Interventions include: Facilitated expression of thoughts and feelings    Patient Plan of Care: Spiritual Care available upon further referral  Family/Friends Plan of Care: Spiritual Care available upon further referral    Electronically signed by Chaplain Elliot on 2/5/2025 at 3:54 PM

## 2025-02-05 NOTE — PROGRESS NOTES
Comprehensive Nutrition Assessment    Type and Reason for Visit:  Initial, LOS, Wound (LOS Day 9 , 2 incision wounds)    Nutrition Recommendations/Plan:   Continue soft and bite sized texture and adjust per SLP recommendations.   Continue 4 CHO choice and Cardiac diet restrictions.   Continue standard high calorie/high protein oral supplement daily at lunch.   Please continue to document pt's po intake of snacks, meals, and oral nutrition supplements in pt's chart for review.   Will monitor pt's GI status, wt, skin, po intake, lytes, labs, and POC.   Will follow pt as a high nutrition risk at this time.      Malnutrition Assessment:  Malnutrition Status:  Insufficient data (02/05/25 1007)    Context:  Chronic Illness     Findings of the 6 clinical characteristics of malnutrition:  Energy Intake:  Unable to assess (1-25 % (2) and 51-75% (4) in flowsheets since admission, unsure of length of poor appetite pta.)  Weight Loss:  Mild weight loss (~3.8% body wt in ~ 1 month if wt(s) in chart are accurate, greater than 14% in 1 wk expected to be inaccuate)     Body Fat Loss:  Unable to assess     Muscle Mass Loss:  Unable to assess    Fluid Accumulation:  Unable to assess     Strength:  Not Performed    Nutrition Assessment:    Pt admitted with CAD excluded. Pt has a pmh of VHD- AI/MR, HTN, GERD, depression, and HLD. Pt currently on a soft and bite sized texture with 4 CHO choices/cardiac diet order. Pt has standard oral nutrition supplements ordered TID. Upon review of body wt's pt's CBW was flagged as significant. Upon chart review CBW is ~15 % less than first measured body wt. However, 1/29/25 body wt expected to be inaccurate.  (53.3 kg) . Pt's body wt(s) obtained appear to vary greatly since admission and may be inaccurate. Pt's po intake upon chart review, is fair to poor. (1-25%, and 51-75%.) Pt noted to have consumed oral nutrition supplement ordered % at least once to support. Per SLP note on 2/3, pt

## 2025-02-05 NOTE — PROGRESS NOTES
Report called to YOBANI Albert on ARU. All questions answered at this time. Patient's PICC removed with no complications. Patient taken via wheelchair to room 1024 with all belongings.

## 2025-02-05 NOTE — PROGRESS NOTES
Occupational Therapy  .  Occupational Therapy Treatment Note    Name: Rachel Mcdaniel MRN: [id number] :   1950   Date:  2025   Admission Date: 2025 Room:  -A     Primary Problem:      Restrictions/Precautions:          General precautions, fall risk  cardiac    Communication with other providers:  cotx with PTA for assist, tolerance with therapy, fatigue ,  and CM updated note    Subjective:  Patient states:  no means no  Pain:   Location, Type, Intensity (0/10 to 10/10):  3/10 chest    Objective:    Observation: patient in recliner eating breakfast initially. Encouraged to work with therapy after breakfast. Patient does demo some hesitancy with performing therapy at this time. Mostly d/t \" being cleaned up earlier from having a blow out and wanting to shower later this date.\" Encouraged patient to use commode or perform other ADLs at this time but patient politely declining saying she will perform tasks later with a RN.   Objective Measures:  tele    Treatment, including education:    ADL activity training was instructed today. Cues were given for safety, sequence, UE/LE placement, visual cues, and balance.    Activities performed today included dressing, toileting, hand hygiene, and grooming.        Grooming:  Facial hygiene-declined  Hair-SBA while seated in recliner to brush hair.     UB dressing- Max A to don patients Robe at this time.     Feeding- Patient performs opening bottle cap and pouring into another bottle with SUP.       Therapeutic activity training was instructed today.  Cues were given for safety, sequence, UE/LE placement, awareness, and balance.    Activities performed today included bed mobility training, sup-sit, sit-stand, SPT.    Patient asks COAT to help lean her forward in recliner. Patietn required Min A .    Stand to FWW- patient attempted x2 trials with no success. 3rd trial CGA. Cues for technique.     Functional mobility- CGA-patient performed task  with RW

## 2025-02-05 NOTE — PROGRESS NOTES
4 Eyes Skin Assessment     NAME:  Rachel Mcdaniel  YOB: 1950  MEDICAL RECORD NUMBER:  [id number]    The patient is being assessed for  Admission    I agree that at least one RN has performed a thorough Head to Toe Skin Assessment on the patient. ALL assessment sites listed below have been assessed.      Areas assessed by both nurses:    Head, Face, Ears, Shoulders, Back, Chest, Arms, Elbows, Hands, Sacrum. Buttock, Coccyx, Ischium, Legs. Feet and Heels, and Under Medical Devices         Does the Patient have a Wound? No noted wound(s) patient has surgical incisions on mid chest, upper abdomen, left lower leg and RT upper arm, see LDA       Mohsen Prevention initiated by RN: Yes  Wound Care Orders initiated by RN: No    Pressure Injury (Stage 3,4, Unstageable, DTI, NWPT, and Complex wounds) if present, place Wound referral order by RN under : No    New Ostomies, if present place, Ostomy referral order under : No     Nurse 1 eSignature: Electronically signed by Glenna Silva LPN on 2/5/25 at 5:39 PM EST    **SHARE this note so that the co-signing nurse can place an eSignature**    Nurse 2 eSignature: Electronically signed by Nela Romero RN on 2/5/25 at 5:58 PM EST

## 2025-02-05 NOTE — PLAN OF CARE
ARU Interdisciplinary Plan of Care (IPOC)  [address] [place]  [address]  [phone]  Fax: [phone]    Rachel Mcdaniel    : 1950  [facility]: [id number]  MRN: [id number]   PHYSICIAN:  SUZANNE Dodd MD  Primary Active Problems:   Active Hospital Problems    Diagnosis Date Noted    Generalized weakness [R53.1] 2025    Gait disturbance [R26.9] 2025    Uncontrolled pain [R52] 2025    Acute blood loss anemia [D62] 2025    Oropharyngeal dysphagia [R13.12] 2025    Paroxysmal atrial fibrillation (HCC) [I48.0] 2025    Depression with anxiety [F41.8] 2025    History of breast cancer [Z85.3] 2025    CHF following cardiac surgery, postop [I97.130] 2025    Essential hypertension [I10]      Rehabilitation Diagnosis:     Atherosclerotic heart disease of native coronary artery without angina pectoris [I25.10]  CHF following cardiac surgery, postop [I97.130]     CARE PLAN   NURSING:  Rachel Mcdaniel while on this unit will:     Bowel and Bladder   [x] Be continent of bowel and bladder      [x] Have an adequate number of bowel movements   [] Urinate with no urinary retention >300ml in bladder   [] Bladder Scan: (details)   [] Complete bladder protocol with bundy removal   [] Initiate Bladder Program to toilet every ___ hours   [] Initiate Bowel Program to toilet every ___hours   [] Bladder training    [] Bowel training  Pulmonary   [x] Maintain O2 SATs at 92% or greater  Pain Management   [x] Have pain managed while on ARU        [] Be pain free by discharge    [x] Medication Management and Education  Maintenance of Skin Integrity/Wound Management   [x] Have no skin breakdown while on ARU   [] Have improved skin integrity via wound measurements   [] Have no signs/symptoms of infection via infection protection and monitoring at the          wound site  Fall Prevention   [x] Be free from injury  seated 2* fatigue from shower.  CARE Score: 4  Discharge Goal: Independent    UB/LB Bathing: Shower/Bathe Self  Assistance Needed: Supervision or touching assistance  Comment: Pt completed a full shower seated for majority. Pt was able to complete figure four positioning to wash distal BLEs. In stance OT provided CGA as Pt washed buttocks.  CARE Score: 4  Discharge Goal: Independent    UB Dressing: Upper Body Dressing  Assistance Needed: Supervision or touching assistance  Comment: Pt able to don sweatshirt while seated.  CARE Score: 4  Discharge Goal: Independent         LB Dressing: Lower Body Dressing  Assistance Needed: Supervision or touching assistance  Comment: Pt threaded BLEs into briefs and pants while seated using figure four positioning. In stance OT provided CGA as Pt managed both over hips.  CARE Score: 4  Discharge Goal: Independent    Donning and [place] Footwear: Putting On/Taking Off Footwear  Assistance Needed: Supervision or touching assistance  Comment: Pt able to doff/don hospital socks using figure four positioning. Pt did not want to don shoes at this time d/t fatigue.  CARE Score: 4  Discharge Goal: Independent      Toileting: Toileting Hygiene  Assistance needed: Partial/moderate assistance  Comment: Per Candice PT, cues for keeping sternal precautions during bowel hygeine, managed changing incontinence brief with min assist.  CARE Score: 3      Toilet Transfers:   Toilet Transfer  Assistance needed: Supervision or touching assistance  Comment: Per Candice PT, CGA with cues for sternal precautions.  CARE Score: 4      SPEECH THERAPY: (If ordered)  Plan of Care and Goals:   LTG                                                            LTG:                           Treatments may include speech/language/communication therapy, cognitive training, animal assisted therapy, group therapy, education, and/or dysphagia therapy based on the above goals.  Co-treats where appropriate with PT or OT to  Assist/Cues     [] Maximize level of mobility and ADL's to decrease burden on caregiver    IPOC brief synthesis of Preadmission Screen, Post-Admission Evaluation, and Therapy Evaluations: Acute inpatient rehabilitation with occupational and physical therapy 180 minutes 5 out of every 7 days. Will address basic and  advancing mobility with self-care instruction and adaptive equipment training. Caregiver education will be offered. Expected length of stay  prior to a supervised level of function for discharge home with a walker and C OT/PT is 2 weeks.     Additional recommendation:     Congestive heart failure after cardiac surgery: The patient requires daily occupational and physical therapy.  With her poorly controlled pain, generalized weakness and unfamiliarity with sternal precautions, she is at risk for fall with injury during standing ADLs and mobility activities.  Therefore, we need to provide her adaptive equipment training with strengthening exercises, balance recovery training and conditioning development.  We must monitor her incisions for signs of infections.  She needs aggressive pulmonary hygiene measures, consistent pain management and monitoring of bowel and bladder function with retraining as indicated.  We must encourage progressive mobility for skin protection and bowel activation.  Caregiver training with her family may be helpful for the transition home.  Will continue diuresis with daily Lasix (40 mg) and afterload reduction with the Lopressor.  Continuing antiplatelet therapy with a baby aspirin as well as her statin (Lipitor).  Outpatient follow-up with her cardiothoracic surgeon and primary care doctor following rehab discharge.  DVT prophylaxis: Continuing the DOAC (Eliquis) for her atrial arrhythmia as it will protect against new VTE.  However, it does raise her risk for spontaneous hemorrhage and GI bleeding.  Therefore, I must monitor her hemoglobin regularly and we will consider GI

## 2025-02-06 ENCOUNTER — APPOINTMENT (OUTPATIENT)
Dept: GENERAL RADIOLOGY | Age: 75
DRG: 316 | End: 2025-02-06
Attending: PHYSICAL MEDICINE & REHABILITATION
Payer: MEDICARE

## 2025-02-06 PROBLEM — F41.8 DEPRESSION WITH ANXIETY: Status: ACTIVE | Noted: 2025-02-06

## 2025-02-06 PROBLEM — I48.0 PAROXYSMAL ATRIAL FIBRILLATION (HCC): Status: ACTIVE | Noted: 2025-02-06

## 2025-02-06 PROBLEM — R26.9 GAIT DISTURBANCE: Status: ACTIVE | Noted: 2025-02-06

## 2025-02-06 PROBLEM — Z85.3 HISTORY OF BREAST CANCER: Status: ACTIVE | Noted: 2025-02-06

## 2025-02-06 PROBLEM — R52 UNCONTROLLED PAIN: Status: ACTIVE | Noted: 2025-02-06

## 2025-02-06 PROBLEM — D62 ACUTE BLOOD LOSS ANEMIA: Status: ACTIVE | Noted: 2025-02-06

## 2025-02-06 PROBLEM — R13.12 OROPHARYNGEAL DYSPHAGIA: Status: ACTIVE | Noted: 2025-02-06

## 2025-02-06 PROBLEM — R53.1 GENERALIZED WEAKNESS: Status: ACTIVE | Noted: 2025-02-06

## 2025-02-06 LAB
ERYTHROCYTE [DISTWIDTH] IN BLOOD BY AUTOMATED COUNT: 14.9 % (ref 11.7–14.9)
HCT VFR BLD AUTO: 28.9 % (ref 37–47)
HGB BLD-MCNC: 9.2 G/DL (ref 12.5–16)
MAGNESIUM SERPL-MCNC: 2.2 MG/DL (ref 1.8–2.4)
MCH RBC QN AUTO: 30.1 PG (ref 27–31)
MCHC RBC AUTO-ENTMCNC: 31.8 G/DL (ref 32–36)
MCV RBC AUTO: 94.4 FL (ref 78–100)
PHOSPHATE SERPL-MCNC: 3.7 MG/DL (ref 2.5–4.9)
PLATELET # BLD AUTO: 462 K/UL (ref 140–440)
PMV BLD AUTO: 9.6 FL (ref 7.5–11.1)
RBC # BLD AUTO: 3.06 M/UL (ref 4.2–5.4)
WBC OTHER # BLD: 13.1 K/UL (ref 4–10.5)

## 2025-02-06 PROCEDURE — 94669 MECHANICAL CHEST WALL OSCILL: CPT

## 2025-02-06 PROCEDURE — 6370000000 HC RX 637 (ALT 250 FOR IP): Performed by: PHYSICAL MEDICINE & REHABILITATION

## 2025-02-06 PROCEDURE — 84100 ASSAY OF PHOSPHORUS: CPT

## 2025-02-06 PROCEDURE — 97166 OT EVAL MOD COMPLEX 45 MIN: CPT

## 2025-02-06 PROCEDURE — 94761 N-INVAS EAR/PLS OXIMETRY MLT: CPT

## 2025-02-06 PROCEDURE — 83735 ASSAY OF MAGNESIUM: CPT

## 2025-02-06 PROCEDURE — 99232 SBSQ HOSP IP/OBS MODERATE 35: CPT | Performed by: PHYSICAL MEDICINE & REHABILITATION

## 2025-02-06 PROCEDURE — 97535 SELF CARE MNGMENT TRAINING: CPT

## 2025-02-06 PROCEDURE — 82330 ASSAY OF CALCIUM: CPT

## 2025-02-06 PROCEDURE — 97530 THERAPEUTIC ACTIVITIES: CPT

## 2025-02-06 PROCEDURE — 1280000000 HC REHAB R&B

## 2025-02-06 PROCEDURE — 97116 GAIT TRAINING THERAPY: CPT

## 2025-02-06 PROCEDURE — 94664 DEMO&/EVAL PT USE INHALER: CPT

## 2025-02-06 PROCEDURE — 85027 COMPLETE CBC AUTOMATED: CPT

## 2025-02-06 PROCEDURE — 36415 COLL VENOUS BLD VENIPUNCTURE: CPT

## 2025-02-06 PROCEDURE — 6370000000 HC RX 637 (ALT 250 FOR IP): Performed by: PHYSICIAN ASSISTANT

## 2025-02-06 PROCEDURE — 71045 X-RAY EXAM CHEST 1 VIEW: CPT

## 2025-02-06 PROCEDURE — 97163 PT EVAL HIGH COMPLEX 45 MIN: CPT

## 2025-02-06 RX ADMIN — BACITRACIN: 500 OINTMENT TOPICAL at 20:56

## 2025-02-06 RX ADMIN — LEVOTHYROXINE SODIUM 125 MCG: 0.12 TABLET ORAL at 05:31

## 2025-02-06 RX ADMIN — GUAIFENESIN 1200 MG: 600 TABLET, EXTENDED RELEASE ORAL at 08:16

## 2025-02-06 RX ADMIN — ALPRAZOLAM 0.25 MG: 0.5 TABLET ORAL at 21:01

## 2025-02-06 RX ADMIN — ACETAMINOPHEN 650 MG: 325 TABLET ORAL at 05:31

## 2025-02-06 RX ADMIN — BACITRACIN: 500 OINTMENT TOPICAL at 08:17

## 2025-02-06 RX ADMIN — AMLODIPINE BESYLATE 2.5 MG: 5 TABLET ORAL at 08:16

## 2025-02-06 RX ADMIN — LATANOPROST 1 DROP: 50 SOLUTION OPHTHALMIC at 20:57

## 2025-02-06 RX ADMIN — APIXABAN 5 MG: 5 TABLET, FILM COATED ORAL at 08:16

## 2025-02-06 RX ADMIN — FUROSEMIDE 40 MG: 40 TABLET ORAL at 08:16

## 2025-02-06 RX ADMIN — ACETAMINOPHEN 650 MG: 325 TABLET ORAL at 18:15

## 2025-02-06 RX ADMIN — METOPROLOL TARTRATE 75 MG: 50 TABLET, FILM COATED ORAL at 08:16

## 2025-02-06 RX ADMIN — POTASSIUM BICARBONATE 20 MEQ: 782 TABLET, EFFERVESCENT ORAL at 08:16

## 2025-02-06 RX ADMIN — AMIODARONE HYDROCHLORIDE 400 MG: 200 TABLET ORAL at 20:55

## 2025-02-06 RX ADMIN — AMIODARONE HYDROCHLORIDE 400 MG: 200 TABLET ORAL at 08:15

## 2025-02-06 RX ADMIN — METOPROLOL TARTRATE 75 MG: 50 TABLET, FILM COATED ORAL at 20:54

## 2025-02-06 RX ADMIN — ASPIRIN 81 MG CHEWABLE TABLET 81 MG: 81 TABLET CHEWABLE at 08:16

## 2025-02-06 RX ADMIN — GUAIFENESIN 1200 MG: 600 TABLET, EXTENDED RELEASE ORAL at 20:54

## 2025-02-06 RX ADMIN — ACETAMINOPHEN 650 MG: 325 TABLET ORAL at 11:35

## 2025-02-06 RX ADMIN — APIXABAN 5 MG: 5 TABLET, FILM COATED ORAL at 20:54

## 2025-02-06 RX ADMIN — ACETAMINOPHEN 650 MG: 325 TABLET ORAL at 20:54

## 2025-02-06 RX ADMIN — ATORVASTATIN CALCIUM 40 MG: 40 TABLET, FILM COATED ORAL at 20:54

## 2025-02-06 ASSESSMENT — PAIN SCALES - WONG BAKER: WONGBAKER_NUMERICALRESPONSE: NO HURT

## 2025-02-06 ASSESSMENT — PAIN SCALES - GENERAL: PAINLEVEL_OUTOF10: 0

## 2025-02-06 NOTE — PROGRESS NOTES
Physical Therapy  [facility] ARU PHYSICAL THERAPY EVALUATION    Chart Review:  Past Medical History:   Diagnosis Date    Allergic rhinitis     Breast cancer (HCC) 11/2005, 10/2003, 1/18/2002    Depression     Family history of coronary artery disease     Fibroids 1984    heavy periods with fibroids    Frozen shoulder 12/12/2008    left    GERD (gastroesophageal reflux disease)     H/O cardiovascular stress test 12/03/2015    treadmill    H/O chest x-ray 05/17/2007    H/O echocardiogram 06/02/2011    mild diastolic dysfunction    History of exercise stress test 12/26/2019    treadmill  Negative for ischemia by diagnostic criteria, Frequent PAC during stress at peak exercise, no chest pain, heavy PAC burden seen in stress    History of left heart catheterization (LHC) 01/23/2025    History of transesophageal echocardiography (BELLE) 01/27/2025    Hx of  Carotid Doppler ultrasound 12/20/2016    Normal echo    Hyperlipidemia     Hypertension     Hypothyroidism     VHD (valvular heart disease)     Mitral valve disease     Past Surgical History:   Procedure Laterality Date    BREAST LUMPECTOMY  , 1/18/02    left, 10/27/06    BREAST SURGERY  6/9/2006    b/l reconstruction    CARDIAC PROCEDURE N/A 1/23/2025    Left heart cath / coronary angiography performed by Jeancarlos Solomon MD at [facility] CARDIAC CATH LAB    CHOLECYSTECTOMY  10/24/2007    CORONARY ARTERY BYPASS GRAFT N/A 1/28/2025    CORONARY ARTERY BYPASS GRAFT X3; LEFT RADIAL ARTERY-LAD; SVG-DIAG; SVG-OM1; LEFT ENDOSCOPIC GREATER SAPHENOUS VEIN HARVEST; LEFT ENDOSCOPIC RADIAL ARETY HARVEST; LEFT ATRIAL APPENDAGE LIGATION WITH A 45MM ATRICLIP; INSERTION OF LEFT FEMORAL ARTERIAL LINE; BELLE; performed by Tunde Byrd MD at [facility] OR    HYSTERECTOMY (CERVIX STATUS UNKNOWN)  1984    total    MASTECTOMY  11/2005    B/L    OTHER SURGICAL HISTORY  12/12/08    arthroscopy    THYROIDECTOMY       Fall History: Has the patient had two or more falls in the past year or any fall with  precautions  CARE Score: 4  Discharge Goal: Independent    Ambulation:   Device used PTA: none   Walking Ability  Does the Patient Walk?: Yes     Walk 10 Feet  Assistance Needed: Supervision or touching assistance  Comment: CGA with 2ww  CARE Score: 4  Discharge Goal: Independent     Walk 50 Feet with Two Turns  Assistance Needed: Supervision or touching assistance  Comment: CGA with 2ww  CARE Score: 4  Discharge Goal: Independent     Walk 150 Feet  Comment: 100' max distance tolerated due to feeling SOB, HR remained steady but O2 dropped rom 100% to 92% on room air  Reason if not Attempted: Not attempted due to medical condition or safety concerns  CARE Score: 88  Discharge Goal: Independent     Walking 10 Feet on Uneven Surfaces  Assistance Needed: Supervision or touching assistance  Comment: CGA with 2ww on ramp  CARE Score: 4  Discharge Goal: Independent     1 Step (Curb)  Assistance Needed: Partial/moderate assistance  Comment: min assist with 2ww and max cues  CARE Score: 3  Discharge Goal: Independent     4 Steps  Comment: pt felt too fatigued to try  Reason if not Attempted: Not attempted due to medical condition or safety concerns  CARE Score: 88  Discharge Goal: Independent     12 Steps  Reason if not Attempted: Not attempted due to medical condition or safety concerns  CARE Score: 88  Discharge Goal: Supervision or touching assistance    Gait Deviations: []None [x]Slow kendra  [x] Increased JUANY  [] Staggers [x]Deviated Path  [] Decreased step length  [x] Decreased step height  []Decreased arm swing  [] Shuffles  [] Decreased head and trunk rotation  []other:        Wheelchair:  w/c Ability: Wheelchair Ability  Uses a Wheelchair and/or Scooter?: No                Balance:        Object: Picking Up Object  Assistance Needed: Supervision or touching assistance  Comment: SBA with 2ww and reacher  CARE Score: 4  Discharge Goal: Independent               Assessment:        Body Structures, Functions,  demonstrated decreased activity tolerance, with HR and O2 remaining WFL but O2 did drop within the normal range and she c/o SOB(noted to be mild) and feeling very tired with exertion. She was not able to complete stairs or longer distance gait. Feel that pt will benefit from ARU-PT to address deficits and progress independence, but feel that with her home set up with many stairs, energy conservation strategies and self monitoring of signs of activity intolerance will be important.        Therapy Prognosis: Good  Decision Making: High Complexity  Clinical Presentation: unpredicatable presentation      Patient education:   ARU schedule, ARU expectations for participation, plan of care, sternal precautions  Treatment Initiated:  Functional mobility training, gait training, patient education  Barriers to Improvement:  cognition, endurance  Discharge Recommendations:  home with possible need for change in level of bedroom, [facility]  Equipment Recommendations:  2ww    Goals:  Patient Goals   Patient Goals : return home with walker if needed  Short Term Goals  Time Frame for Short Term Goals: 10 days STG=LTG  Short Term Goal 1: Pt will perform bed mobility with mod I  Short Term Goal 2: Pt will perform all functional trasnfers with mod I  Short Term Goal 3: Pt will ambulate with 2ww on level surfaces 150' and unlevel surfaces 10' with mod I  Short Term Goal 4: Pt will perform curb step with 2ww and 4 steps with B rail mod I, 12 steps with supervision  Short Term Goal 5: Pt will  light object with 2ww and reacher with mod I     Plan:    Requires PT Follow-Up: Yes  Pt will be seen at least 60 minutes per day for a minimum of 5 days per week, plus group therapy as appropriate  Physical Therapy Plan  Current Treatment Recommendations: Strengthening, Balance training, Functional mobility training, Transfer training, IADL training, Endurance training, Stair training, Gait training, Patient/Caregiver education & training,

## 2025-02-06 NOTE — FLOWSHEET NOTE
02/05/25 2046   Treatment Team Notification   Reason for Communication Evaluate  (Pt is requesting Xanax PRN to help with her sleep and anxiety. Pt states that she takes it at home but don't remember the dose.)   Name of Team Member Notified Dr. Dodd   Treatment Team Role Attending Provider   Method of Communication Secure Message   Response See orders   Notification Time 2038

## 2025-02-06 NOTE — PLAN OF CARE
Problem: Discharge Planning  Goal: Discharge to home or other facility with appropriate resources  2/6/2025 1012 by Stacy Lancaster LPN  Outcome: Progressing     Problem: Chronic Conditions and Co-morbidities  Goal: Patient's chronic conditions and co-morbidity symptoms are monitored and maintained or improved  2/6/2025 1012 by Stacy Lancaster LPN  Outcome: Progressing     Problem: Safety - Adult  Goal: Free from fall injury  2/6/2025 1012 by Stacy Lancaster LPN  Outcome: Progressing     Problem: ABCDS Injury Assessment  Goal: Absence of physical injury  2/6/2025 1012 by Stacy Lancaster LPN  Outcome: Progressing     Problem: Pain  Goal: Verbalizes/displays adequate comfort level or baseline comfort level  2/6/2025 1012 by Stacy Lancaster LPN  Outcome: Progressing

## 2025-02-06 NOTE — CARE COORDINATION
Case Management Admission Note    Patient:Rachel Mcdaniel  \"Salima\"   :1950  MRN:[id number]  Rehab Dx/Hx: Atherosclerotic heart disease of native coronary artery without angina pectoris [I25.10]  CHF following cardiac surgery, postop [I97.130]    Chief Complaint:   Past Medical History:   Diagnosis Date    Allergic rhinitis     Breast cancer (HCC) 2005, 10/2003, 2002    Depression     Family history of coronary artery disease     Fibroids     heavy periods with fibroids    Frozen shoulder 2008    left    GERD (gastroesophageal reflux disease)     H/O cardiovascular stress test 2015    treadmill    H/O chest x-ray 2007    H/O echocardiogram 2011    mild diastolic dysfunction    History of exercise stress test 2019    treadmill  Negative for ischemia by diagnostic criteria, Frequent PAC during stress at peak exercise, no chest pain, heavy PAC burden seen in stress    History of left heart catheterization (LHC) 2025    History of transesophageal echocardiography (BELLE) 2025    Hx of  Carotid Doppler ultrasound 2016    Normal echo    Hyperlipidemia     Hypertension     Hypothyroidism     VHD (valvular heart disease)     Mitral valve disease     Past Surgical History:   Procedure Laterality Date    BREAST LUMPECTOMY  , 02    left, 10/27/06    BREAST SURGERY  2006    b/l reconstruction    CARDIAC PROCEDURE N/A 2025    Left heart cath / coronary angiography performed by Jeancarlos Solomon MD at [facility] CARDIAC CATH LAB    CHOLECYSTECTOMY  10/24/2007    CORONARY ARTERY BYPASS GRAFT N/A 2025    CORONARY ARTERY BYPASS GRAFT X3; LEFT RADIAL ARTERY-LAD; SVG-DIAG; SVG-OM1; LEFT ENDOSCOPIC GREATER SAPHENOUS VEIN HARVEST; LEFT ENDOSCOPIC RADIAL ARETY HARVEST; LEFT ATRIAL APPENDAGE LIGATION WITH A 45MM ATRICLIP; INSERTION OF LEFT FEMORAL ARTERIAL LINE; BELLE; performed by Tunde Byrd MD at [facility] OR    HYSTERECTOMY (CERVIX STATUS UNKNOWN)       day and the average length of stay is 7 to 11 days, could vary depending on recommendations of the interdisciplinary team. Patient voiced understanding.     Plan is to D/C home with family, with HHC and DME as recommended by therapy staff. F/U to be set with Arnoldo Wheeler MD and scripts be sent to [facility] in [place]. Son will transport home.    CELESTINA Neri, 2/6/2025, 10:17 AM

## 2025-02-06 NOTE — PROGRESS NOTES
Spoke to DR godinez about patient requesting tylenol, states he will be looking at her orders soon and order some pain medication for patient

## 2025-02-06 NOTE — CONSULTS
Comprehensive Nutrition Assessment    Type and Reason for Visit:  Initial, Consult (oral nutrition supplements)    Nutrition Recommendations/Plan:   Recommend SLP eval for possible diet advancement  Liberalized carb and cardiac restrictions  Modified oral nutrition supplement to standard BID  Monitor weights, po intakes, POC     Malnutrition Assessment:  Malnutrition Status:  At risk for malnutrition (02/06/25 1128)    Context:  Chronic Illness       Nutrition Assessment:    Admitted to ARU w/ CHF following cardiac surgery, pmh of VHD- AI/MR, HTN, GERD, depression, and HLD. Pt c/o very limited food options on soft & bite sized, carb controlled, cardiac diet. Avg po during inpatient stay ws around 51-75% of meals. Liberalized cardiac and carb restrictions to increase food options. pt also states she does not have any chewing/swallowing difficulty, just chronic dry mouth r/t long term Omeprazole use- Perfectserved MD regarding updated SLP eval, last one was 1/31. Noted a significant wt loss over the past mo, however wts have been varied and  may be r/t fluid shifts. Pt does enjoy oral supplements, agreeable to increase to BID, prefers vanilla but will drink chocolate. Follow at moderate nutrition risk.    Nutrition Related Findings:    +lasix, synthroid; hgb 9.2 Wound Type: Multiple, Surgical Incision       Current Nutrition Intake & Therapies:    Average Meal Intake: 51-75%  Average Supplements Intake: %  ADULT ORAL NUTRITION SUPPLEMENT; Breakfast, Dinner; Standard High Calorie/High Protein Oral Supplement  ADULT DIET; Dysphagia - Soft and Bite Sized; prefers vanilla    Anthropometric Measures:  Height: 160 cm (5' 2.99\")  Ideal Body Weight (IBW): 115 lbs (52 kg)    Admission Body Weight: 61.5 kg (135 lb 9.3 oz)  Current Body Weight: 61.5 kg (135 lb 9.3 oz),   IBW. Weight Source: Bed scale  Current BMI (kg/m2): 24  Usual Body Weight: 65.2 kg (143 lb 11.8 oz) (cardiology visit 1/8)     % Weight Change
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
## 2025-02-06 NOTE — PROGRESS NOTES
Rachel Mcdaniel    : 1950  [id number]  MRN: [id number]              PM&R Progress Note      Admitting diagnosis: CHF following cardiac surgery (IGC 9)     Comorbid diagnoses impacting rehabilitation: Generalized weakness, gait disturbance, uncontrolled pain, dysphagia, acute blood loss anemia, essential hypertension, paroxysmal atrial fibrillation, depression with anxiety, hypothyroidism, history of breast cancer.     Chief complaint: Positional dizziness, nausea and fatigue.  Poor sleep.    Prior (baseline) level of function: Independent.    Current level of function:         Current  IRF-SVETLANA and Goals:   Occupational Therapy:      :     :                                       Eating: Eating  Assistance Needed: Independent  CARE Score: 6  Discharge Goal: Independent       Oral Hygiene: Oral Hygiene  Discharge Goal: Independent    UB/LB Bathing:      UB Dressing:           LB Dressing:      Donning and Silvis Footwear:        Toileting:        Toilet Transfers:  Toilet Transfer  Assistance needed: Supervision or touching assistance  Comment: CGA with cues for sternal precautions  CARE Score: 4    Physical Therapy:                Bed Mobility:   Sit to Lying  Assistance Needed: Partial/moderate assistance  Comment: min assist with LE  CARE Score: 3  Discharge Goal: Independent  Roll Left and Right  Assistance Needed: Supervision or touching assistance  Comment: cues for sternal precautions  CARE Score: 4  Discharge Goal: Independent  Lying to Sitting on Side of Bed  Assistance Needed: Partial/moderate assistance  Comment: min assist for trunk and min cues for sternal precautions  CARE Score: 3  Discharge Goal: Independent    Transfers:    Sit to Stand  Assistance Needed: Supervision or touching assistance  Comment: most often pt was CGA, but at times min assist and also noted mild retropulsion at times without LOB  CARE Score: 4  Discharge Goal: Independent  Chair/Bed-to-Chair Transfer  Assistance  reacher  CARE Score: 4  Discharge Goal: Independent    I      Exam:    Blood pressure (!) 132/59, pulse 80, temperature 97.9 °F (36.6 °C), temperature source Oral, resp. rate 16, height 1.6 m (5' 2.99\"), weight 61.5 kg (135 lb 9.3 oz), SpO2 98%.    General: Patient was seen sitting in a bedside chair.  Her friend was present.  She was alert.  Easily distracted.  Poor insight and reasoning.    HEENT: No neck vein distention.  Mucous membranes are moist.  Speech was clear.  Visual fields seem full.  Age-appropriate cervical rotation bilaterally.    Pulmonary: Diminished breath sounds in bases.  No wheezes, rales or coughing.    Cardiac: Infrequent premature beats.  Rate controlled.    Abdomen: Patient's abdomen is soft and nondistended.  Bowel sounds were present throughout.  There was no rebound, guarding or masses noted.    Upper extremities: Patient was quite slow and deliberate with raising her hands up to her chin and to meet mine.  Fair .  No tremor.  Persistent tenderness along the left forearm radial border/A-line cutdown site.    Lower extremities: Trace swelling.  No signs of DVT.    Sitting balance was fair.  Standing balance was poor.    Lab Results   Component Value Date    WBC 13.1 (H) 02/06/2025    HGB 9.2 (L) 02/06/2025    HCT 28.9 (L) 02/06/2025    MCV 94.4 02/06/2025     (H) 02/06/2025     Lab Results   Component Value Date    INR 1.1 02/04/2025    INR 1.2 02/02/2025    INR 1.3 01/29/2025    PROTIME 14.1 02/04/2025    PROTIME 15.3 (H) 02/02/2025    PROTIME 16.2 (H) 01/29/2025     Lab Results   Component Value Date    CREATININE 0.6 02/05/2025    BUN 13 02/05/2025     02/05/2025    K 3.9 02/05/2025     02/05/2025    CO2 26 02/05/2025     Lab Results   Component Value Date    ALT 13 01/29/2025    AST 48 (H) 01/29/2025    ALKPHOS 37 (L) 01/29/2025    BILITOT 0.3 01/29/2025       Expected length of stay  prior to a supervised level of function for discharge home with a walker and  Requesting speech therapy evaluate her swallowing and make adjustments to her diet as appropriate.  For now we are continuing the soft and bite sized modification to her solids.  Essential hypertension: Continuing Lopressor, Lasix and Norvasc for blood pressure regulation.  Target systolic blood pressures 120-140.  Vital signs are checked at rest and with activity.  Encouraging consistent oral hydration.  Her blood pressure was within target range on current medication.  Monitoring closely.  Paroxysmal atrial fibrillation: Monitoring her weights does not reveal any decompensation of CHF.  Providing a DOAC (Eliquis) and diuresis (Lasix).  Rate control with Cordarone and metoprolol.  Depression with anxiety: Using Xanax on an as-needed basis (a chronic medication for her) as well as Mucinex for the apprehension of dyspnea.  Continuing her diuresis as well.  Treating her in a calm consistent environment when possible.  Involving her family in the rehab process as much as possible.       The history and exam here today does not substantially differ from the history and physical documented yesterday in the moments before she was registered as a patient on our rehab unit.
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
## 2025-02-07 LAB — CA-I BLD-SCNC: 1.22 MMOL/L (ref 1.15–1.33)

## 2025-02-07 PROCEDURE — 94150 VITAL CAPACITY TEST: CPT

## 2025-02-07 PROCEDURE — 97110 THERAPEUTIC EXERCISES: CPT

## 2025-02-07 PROCEDURE — 94761 N-INVAS EAR/PLS OXIMETRY MLT: CPT

## 2025-02-07 PROCEDURE — 97535 SELF CARE MNGMENT TRAINING: CPT

## 2025-02-07 PROCEDURE — 6370000000 HC RX 637 (ALT 250 FOR IP): Performed by: PHYSICIAN ASSISTANT

## 2025-02-07 PROCEDURE — 6370000000 HC RX 637 (ALT 250 FOR IP): Performed by: PHYSICAL MEDICINE & REHABILITATION

## 2025-02-07 PROCEDURE — 94664 DEMO&/EVAL PT USE INHALER: CPT

## 2025-02-07 PROCEDURE — 97116 GAIT TRAINING THERAPY: CPT

## 2025-02-07 PROCEDURE — 36415 COLL VENOUS BLD VENIPUNCTURE: CPT

## 2025-02-07 PROCEDURE — 1280000000 HC REHAB R&B

## 2025-02-07 PROCEDURE — 97150 GROUP THERAPEUTIC PROCEDURES: CPT

## 2025-02-07 PROCEDURE — 99232 SBSQ HOSP IP/OBS MODERATE 35: CPT | Performed by: PHYSICAL MEDICINE & REHABILITATION

## 2025-02-07 PROCEDURE — 82330 ASSAY OF CALCIUM: CPT

## 2025-02-07 PROCEDURE — 92610 EVALUATE SWALLOWING FUNCTION: CPT

## 2025-02-07 PROCEDURE — 97530 THERAPEUTIC ACTIVITIES: CPT

## 2025-02-07 RX ORDER — IBUPROFEN 400 MG/1
400 TABLET, FILM COATED ORAL EVERY 6 HOURS PRN
Status: DISCONTINUED | OUTPATIENT
Start: 2025-02-07 | End: 2025-02-14 | Stop reason: HOSPADM

## 2025-02-07 RX ADMIN — FUROSEMIDE 40 MG: 40 TABLET ORAL at 08:26

## 2025-02-07 RX ADMIN — APIXABAN 5 MG: 5 TABLET, FILM COATED ORAL at 08:26

## 2025-02-07 RX ADMIN — GUAIFENESIN 1200 MG: 600 TABLET, EXTENDED RELEASE ORAL at 21:04

## 2025-02-07 RX ADMIN — BACITRACIN: 500 OINTMENT TOPICAL at 08:27

## 2025-02-07 RX ADMIN — IBUPROFEN 400 MG: 400 TABLET, FILM COATED ORAL at 23:47

## 2025-02-07 RX ADMIN — AMLODIPINE BESYLATE 2.5 MG: 5 TABLET ORAL at 08:26

## 2025-02-07 RX ADMIN — METOPROLOL TARTRATE 75 MG: 50 TABLET, FILM COATED ORAL at 21:03

## 2025-02-07 RX ADMIN — AMIODARONE HYDROCHLORIDE 400 MG: 200 TABLET ORAL at 21:03

## 2025-02-07 RX ADMIN — ALPRAZOLAM 0.25 MG: 0.5 TABLET ORAL at 23:47

## 2025-02-07 RX ADMIN — AMIODARONE HYDROCHLORIDE 400 MG: 200 TABLET ORAL at 08:26

## 2025-02-07 RX ADMIN — BACITRACIN: 500 OINTMENT TOPICAL at 21:04

## 2025-02-07 RX ADMIN — ATORVASTATIN CALCIUM 40 MG: 40 TABLET, FILM COATED ORAL at 21:04

## 2025-02-07 RX ADMIN — APIXABAN 5 MG: 5 TABLET, FILM COATED ORAL at 21:03

## 2025-02-07 RX ADMIN — ASPIRIN 81 MG CHEWABLE TABLET 81 MG: 81 TABLET CHEWABLE at 08:25

## 2025-02-07 RX ADMIN — ACETAMINOPHEN 650 MG: 325 TABLET ORAL at 12:30

## 2025-02-07 RX ADMIN — ACETAMINOPHEN 650 MG: 325 TABLET ORAL at 06:13

## 2025-02-07 RX ADMIN — METOPROLOL TARTRATE 75 MG: 50 TABLET, FILM COATED ORAL at 08:26

## 2025-02-07 RX ADMIN — GUAIFENESIN 1200 MG: 600 TABLET, EXTENDED RELEASE ORAL at 08:26

## 2025-02-07 RX ADMIN — POTASSIUM BICARBONATE 20 MEQ: 782 TABLET, EFFERVESCENT ORAL at 08:26

## 2025-02-07 RX ADMIN — LATANOPROST 1 DROP: 50 SOLUTION OPHTHALMIC at 21:03

## 2025-02-07 RX ADMIN — LEVOTHYROXINE SODIUM 125 MCG: 0.12 TABLET ORAL at 06:14

## 2025-02-07 RX ADMIN — ACETAMINOPHEN 650 MG: 325 TABLET ORAL at 18:40

## 2025-02-07 ASSESSMENT — PAIN DESCRIPTION - LOCATION
LOCATION: COCCYX
LOCATION: OTHER (COMMENT)

## 2025-02-07 ASSESSMENT — PAIN SCALES - GENERAL
PAINLEVEL_OUTOF10: 2
PAINLEVEL_OUTOF10: 0
PAINLEVEL_OUTOF10: 7

## 2025-02-07 ASSESSMENT — PAIN DESCRIPTION - DESCRIPTORS
DESCRIPTORS: ACHING
DESCRIPTORS: ACHING

## 2025-02-07 ASSESSMENT — PAIN DESCRIPTION - ORIENTATION: ORIENTATION: MID

## 2025-02-07 ASSESSMENT — PAIN - FUNCTIONAL ASSESSMENT: PAIN_FUNCTIONAL_ASSESSMENT: ACTIVITIES ARE NOT PREVENTED

## 2025-02-07 NOTE — PROGRESS NOTES
Occupational Therapy    Physical Rehabilitation: OCCUPATIONAL THERAPY     [x] daily progress note       [] discharge       Patient Name:  Rachel Mcdaniel   :  1950 MRN: [id number]  Room:  [address] [place] [address] Date of Admission: 2025  Rehabilitation Diagnosis:   Atherosclerotic heart disease of native coronary artery without angina pectoris [I25.10]  CHF following cardiac surgery, postop [I97.130]       Date 2025       Day of ARU Week:  3   Time IN/OUT 3229-7229   Individual Tx Minutes 60   Group Tx Minutes    Co-Treat Minutes    Concurrent Tx Minutes    TOTAL Tx Time Mins 60   Variance Time    Variance Reason []   Refusal due to:     []   Medical hold/reason:    []   Illness   []   Off Unit for test/procedure  []   Extra time needed to complete task  []   Therapeutic need  []   Make up mins were attempted but pt unable to complete due to (specify)  []   Other (specify):   Restrictions Restrictions/Precautions: Fall Risk, General Precautions, Surgical Protocols (sternal precautions)         Communication with other providers: [x]   OK to see per nursing:     []   Spoke with team member regarding:      Subjective observations and cognitive status: Pt sitting up in recliner on approach and just received her breakfast. Pt was pleasant and agreeable to therapy.      Pain level/location:   7 /10       Location: \"tail bone\"    Discharge recommendations  Anticipated discharge date:  TBD  Destination: []home alone   []home alone w assist prn   [] home w/ family    [] Continuous supervision       []SNF    [] Assisted living     [] Other:   Continued therapy: []HHC OT  []OUTPATIENT  OT   [] No Further OT  Equipment needs: TBD        ADLs:    Eating: Eating  Assistance Needed: Independent  Comment: able to open all packages/containers,feed self  CARE Score: 6  Discharge Goal: Independent       UB Dressing: Upper Body Dressing  Assistance Needed: Supervision or touching assistance  Comment: to don sweater seated  CARE  Assist for Transfers: Independent  Active : Yes  Mode of Transportation: SUV  Additional Comments: sleeps in flat bed, no exits in basement except stairs.    Objective                                                                                    Goals:  (Update in navigator)  Short Term Goals  Time Frame for Short Term Goals: STGs=LTGs:  Long Term Goals  Time Frame for Long Term Goals : 10 days or until d/c.  Long Term Goal 1: Pt will complete oral hygiene and grooming tasks with IND.  Long Term Goal 2: Pt will complete total body bathing with AE PRN and Mod I.  Long Term Goal 3: Pt will complete UB dressing with IND.  Long Term Goal 4: Pt will complete LB dressing with AE PRN and Mod I.  Long Term Goal 5: Pt will doff/don footwear with AE PRN and Mod I.  Additional Goals?: Yes  Long Term Goal 6: Pt will complete toileting with Mod I.  Long Term Goal 7: Pt will complete functional transfers (bed, chair, shower, toilet) with DME PRN and Mod I.  Long Term Goal 8: Pt will perform therex/therax to facilitate an increase in strength/endurance with emphasis on dynamic standing balance/tolerance > 8 mins with Mod I.  Long Term Goal 9: Pt will perform an IADL with Mod I.:        Plan of Care                                                                              Times per week: 5 days per week for a minimum of 60 minutes/day plus group as appropriate for 60 minutes.  Treatment to include Occupational Therapy Plan  Current Treatment Recommendations: Balance training, Functional mobility training, Strengthening, Endurance training, Pain management, Safety education & training, Patient/Caregiver education & training, Equipment evaluation, education, & procurement, Positioning, Self-Care / ADL, Home management training, Cognitive/Perceptual training, Group Therapy    Electronically signed by   GAYLE Vogel,  2/7/2025, 9:23 AM

## 2025-02-07 NOTE — PROGRESS NOTES
Physical Therapy  [x] daily progress note       [] discharge       Patient Name:  Rachel Mcdaniel   :  1950 MRN: [id number]  Room:  [address] [place], [place] [address] Date of Admission: 2025  Rehabilitation Diagnosis:   Atherosclerotic heart disease of native coronary artery without angina pectoris [I25.10]  CHF following cardiac surgery, postop [I97.130]       Date 2025       Day of ARU Week:  3   Time IN/OUT 1500/1530   Individual Tx Minutes 30   Group Tx Minutes    Co-Treat Minutes    Concurrent Tx Minutes    TOTAL Tx Time Mins 30   Variance Time    Variance Time []   Refusal due to:     []   Medical hold/reason:    []   Illness   []   Off Unit for test/procedure  []   Extra time needed to complete task  []   Therapeutic need  []   Other (specify):   Restrictions Restrictions/Precautions  Restrictions/Precautions: Fall Risk, General Precautions, Surgical Protocols (sternal precautions)  Position Activity Restriction  Sternal Precautions: No Pushing, No Pulling, 5# Lifting Restrictions   Interdisciplinary communication [x]   Cleared for therapy per nursing     []   RN notified about issues during session  []   RN updated on pt performance  []   Spoke with   []   Spoke with OT  []   Spoke with MD  []   Other:    Subjective observations and cognitive status: Pt in bed upon approach.      Pain level/location:  2  /10       Location: B shoulders     Discharge recommendations  Anticipated discharge date:  tbd  Destination: []home alone   []home alone with assist PRN     [x] home w/ family      [] Continuous supervision  []SNF    [] Assisted living     [] Other:  Continued therapy: [x]HHC PT  []OUTPATIENT  PT   [] No Further PT  []SNF PT  Caregiver training recommended: []Yes  [] No   Equipment needs: 2ww     Bed Mobility:           []   Pt received out of bed   Lying --> Sit:  min assist for trunk  Sit --> lying:  SBA with mod effort  Cues for technique  Bed features used: [] Yes  [x] No       Transfers:    Sit-->  Stand:  CG  Stand --> Sit:   CG  Chair-->Bed/Bed --> Chair:   CG    Gait:    Distance:  150'   Assistance:  CGA  Device:  2ww  Gait Quality:  slow but continuous steps with no rests      Additional Therapeutic activities/exercises completed this date:     []   Nu-step:  Time:        Level:         #Steps:       []   Rebounder:    []  Seated     []  Standing        []   Balance training         []   Postural training    []   Supine ther ex (reps/sets):     []   Seated ther ex (reps/sets):     []   Standing ther ex (reps/sets):     []   Picking up object from floor (standing):                   []   Reacher used   []   Other:   []   Other:  Pt was instructed in Intermediate Cardiac ex program: performed seated  Overhead Side Stretch x 5  Ankle Pumps/ Heel Raises x 5  Marching Seated x 5  Forward Arm Raises x 5  Side Arm Raises x 5  Arm Crosses x 5  Arm Circles Forwards and Backwards x 5  SittingTrunkTwist x 5    Instructed in pursed lip breathing, signs of activity intolerance, EMILIANA exertion scale with need for reinforcement.      Comments:      Patient/Caregiver Education and Training:   []   Role of PT  [x]   Education about Dx  []   Use of call light for assist   []   Wheelchair mobility/management  []   HEP provided and explained   [x]   Treatment plan reviewed  []   Home safety  []   Body mechanics  []   Positioning  [x]   Bed Mobility/Transfer technique  [x]   Gait technique/sequencing  []   Proper use of assistive device/adaptive equipment  [x]   Stair training/Advanced mobility safety and technique  [x]   Reinforced patient's precautions/mobility while maintaining precautions  []   Postural awareness  []   Family/caregiver training  []   Progress was updated and reviewed in Rehabtracker with patient and/or family this date.  []   Other:      Treatment Plan for Next Session: progress cardiac ex to 10 reps, bed mobility, gait progression      Assessment:    Assessment:  This pt demonstrated a positive  response  Primary  Shopping Responsibility: Primary  Dependent Care Responsibility: Secondary  Health Care Management: Primary  Prior Level of Assist for Ambulation: Independent community ambulator, with or without device (no device)  Prior Level of Assist for Transfers: Independent  Active : Yes  Mode of Transportation: [facility]  Education: HS  Type of Occupation: Worked at [employer].  Leisure & Hobbies: Cooking, shopping, laundry, 2 dogs  Pickles and Nova, Druze, lunch w friends.  Pt sets up meds via pill box.  Pt writes checks and utilizes auto pay.  Additional Comments: sleeps in flat bed, no exits in basement except stairs.    Objective                                                                                    Goals:  (Update in navigator)  Short Term Goals  Time Frame for Short Term Goals: 10 days STG=LTG  Short Term Goal 1: Pt will perform bed mobility with mod I  Short Term Goal 2: Pt will perform all functional trasnfers with mod I  Short Term Goal 3: Pt will ambulate with 2ww on level surfaces 150' and unlevel surfaces 10' with mod I  Short Term Goal 4: Pt will perform curb step with 2ww and 4 steps with B rail mod I, 12 steps with supervision  Short Term Goal 5: Pt will  light object with 2ww and reacher with mod I:   :        Plan of Care                                                                              Times per week: 5 days per week for a minimum of 60 minutes/day plus group as appropriate for 60 minutes.  Treatment to include Current Treatment Recommendations: Strengthening, Balance training, Functional mobility training, Transfer training, IADL training, Endurance training, Stair training, Gait training, Patient/Caregiver education & training, Safety education & training, Home exercise program, Group Therapy, Therapeutic activities, Neuromuscular re-education, Equipment evaluation, education, & procurement, Positioning    Electronically signed by   Esther Hayes,  PT,  2/7/2025, 3:06 PM

## 2025-02-07 NOTE — PLAN OF CARE
Problem: Discharge Planning  Goal: Discharge to home or other facility with appropriate resources  2/7/2025 1015 by Stacy Lancaster LPN  Outcome: Progressing     Problem: Chronic Conditions and Co-morbidities  Goal: Patient's chronic conditions and co-morbidity symptoms are monitored and maintained or improved  2/7/2025 1015 by Stacy Lancaster LPN  Outcome: Progressing     Problem: Safety - Adult  Goal: Free from fall injury  2/7/2025 1015 by Stacy Lancaster LPN  Outcome: Progressing     Problem: ABCDS Injury Assessment  Goal: Absence of physical injury  2/7/2025 1015 by Stacy Lancaster LPN  Outcome: Progressing     Problem: Pain  Goal: Verbalizes/displays adequate comfort level or baseline comfort level  2/7/2025 1015 by Stacy Lancaster LPN  Outcome: Progressing     Problem: Nutrition Deficit:  Goal: Optimize nutritional status  2/7/2025 1015 by Stacy Lancaster LPN  Outcome: Progressing

## 2025-02-07 NOTE — PROGRESS NOTES
Patient instructed and educated on Incentive Spirometer. Patient able to do 1500 ml. Patient's goal is 1750ml.

## 2025-02-07 NOTE — PROGRESS NOTES
Facility/Department: [facility]   CLINICAL BEDSIDE SWALLOW EVALUATION    NAME: Rachel Mcdaniel  : 1950  MRN: [id number]    ADMISSION DATE: 2025  ADMITTING DIAGNOSIS: has Essential hypertension; Mitral valve disease; Hyperlipidemia; Family history of coronary artery disease; GERD (gastroesophageal reflux disease); VHD (valvular heart disease); Abnormal EKG; Other chest pain; Abnormal nuclear stress test; Nonrheumatic aortic valve insufficiency; Coronary artery disease (CAD) excluded; Coronary atherosclerosis of native coronary artery; Aortic regurgitation; Coronary artery disease involving native heart with unstable angina pectoris (HCC); CHF following cardiac surgery, postop; Generalized weakness; Gait disturbance; Uncontrolled pain; Acute blood loss anemia; Oropharyngeal dysphagia; Paroxysmal atrial fibrillation (HCC); Depression with anxiety; and History of breast cancer on their problem list.  ONSET DATE: 25    Recent Chest Xray/CT of Chest: Findings:  AP chest shows median sternotomy over sized cardiac silhouette. Small bilateral   effusions. Likely linear atelectasis left lung base. No pneumothorax. No   evidence of mediastinal shift     IMPRESSION:  1. Likely small bilateral effusions with presumed atelectasis left lung base; no   additional evidence of active pulmonary process          Date of Eval: 2025  Evaluating Therapist: BUFFY Reddy    Current Diet level:  Current Diet : Soft and Bite-Sized  Current Liquid Diet : Thin    Primary Complaint  Pt c/o sore on R side of tongue--feels like it's the start of canker sore.    Pain:  Pain Assessment  Pain Assessment: None - Denies Pain  Pain Level: 2  Guardado-Baker Pain Rating: No hurt  Patient's Stated Pain Goal: 0 - No pain  Pain Location: Coccyx  Pain Orientation: Left  Pain Descriptors: Aching  Functional Pain Assessment: Activities are not prevented  Pain Type: Surgical pain  Pain Frequency: Intermittent  Pain Onset:  On-going  Non-Pharmaceutical Pain Intervention(s): Repositioned, Rest  Response to Pain Intervention: Patient satisfied  Side Effects: No side effects reported or observed  Multiple Pain Sites: No    Reason for Referral  Rachel Mcdaniel was referred for a bedside swallow evaluation to assess the efficiency of her swallow function, identify signs and symptoms of aspiration and make recommendations regarding safe dietary consistencies, effective compensatory strategies, and safe eating environment.  Per PAS:  Rachel \"Salima\"Jonas is a 74 y.o. female with a history of VHD- AI/MR, hypertension and hyperlipidemia.   Rachel had a positive Cardiolite stress test and was ordered [facility] which was positive for triple vessel disease.   Patient underwent CABGx3 on 1/28.  Stroke alert called on 1/29 for right sided weakness and confusion. All imaging was negative for acute findings.    SLP consulted and patient found to have Mild oral stage dysphagia.  Currently on soft and bite sized diet. On 2/4 patient underwent cardioversion 2/2 Afib.  Patient tolerated well.    Patient also being medically managed for Afib and HTN. Patient lives with son and is IND with ADL's and ambulation at baseline.  Currently patient is sba-dep with ADL's and modA with CW for transfers and ambulation.     Functional deterioration with decrease in strength and balance skills, increasing risk for falls requiring rehabilitation and medical monitoring  At risk for post-operative complications of:  DVT, PE, pneumonia, and infection requiring close medical and medication management.    Nurse management for Medication management  Frequent/daily/stat laboratory testing for management of medical complications   Stroke prophylaxis intervention  Pain management    Impression  Dysphagia Diagnosis: Swallow function appears WFL  Dysphagia Outcome Severity Scale: Level 7: Normal in all situations     Treatment Plan  Requires SLP Intervention: No  Duration of Treatment:  No swallow tx     Referral To: Dietician    Recommended Diet and Intervention  Diet Solids Recommendation: Regular  Liquid Consistency Recommendation: Thin  Recommended Form of Meds: Whole with water          Compensatory Swallowing Strategies  Compensatory Swallowing Strategies : Eat/Feed slowly;Upright as possible for all oral intake;Small bites/sips;Alternate solids and liquids    Treatment/Goals  Short-term Goals  Timeframe for Short-term Goals: No swallow tx    General  Chart Reviewed: Yes  Behavior/Cognition: Alert;Cooperative;Pleasant mood  Respiratory Status: Room air  O2 Device: None (Room air)  Communication Observation: Functional  Follows Directions: Complex  Patient Positioning: Upright in chair  Baseline Vocal Quality: Normal  Consistencies Administered: Regular;Easy to chew;Pureed;Thin - cup;Thin - straw    Vision/Hearing  Pt wears glasses; no Crow       Oral Motor Deficits  Labial: No impairment  Dentition: Natural;Intact  Lingual: No impairment  Velum: No Impairment  Mandible: No impairment  Consistencies Administered: Regular;Easy to chew;Pureed;Thin - cup;Thin - straw    Oral Phase Dysfunction  Oral Phase  Oral Phase: WFL  Oral Phase  Oral Phase - Comment: WFL     Indicators of Pharyngeal Phase Dysfunction   Indicators of Pharyngeal Phase Dysfunction  Pharyngeal Phase Comment: WFL    Prognosis  Prognosis: Good  Prognosis Considerations: Potential;Previous Level of Function;Motivation;Participation Level  Consulted and agree with results and recommendations: Patient;RN  RN Name: Stacy Martinez  Patient Education: Recommendations/POC  Patient Education Response: Verbalizes understanding  Safety Devices in place: Yes  Type of devices: Left in chair;Chair alarm in place       Therapy Time  SLP Individual Minutes  Time In: 1205  Time Out: 1235  Minutes: 30            Gris Tapia MA, CCC-SLP  2/7/2025 1:44 PM

## 2025-02-07 NOTE — PROGRESS NOTES
Rachel Mcdaniel    : 1950  Acct #: [id number]  MRN: [id number]              PM&R Progress Note      Admitting diagnosis: ***    Comorbid diagnoses impacting rehabilitation: ***    Chief complaint: ***    Prior (baseline) level of function: Independent.    Current level of function:         Current  IRF-SVETLANA and Goals:   Occupational Therapy:    Short Term Goals  Time Frame for Short Term Goals: STGs=LTGs :   Long Term Goals  Time Frame for Long Term Goals : 10 days or until d/c.  Long Term Goal 1: Pt will complete oral hygiene and grooming tasks with IND.  Long Term Goal 2: Pt will complete total body bathing with AE PRN and Mod I.  Long Term Goal 3: Pt will complete UB dressing with IND.  Long Term Goal 4: Pt will complete LB dressing with AE PRN and Mod I.  Long Term Goal 5: Pt will doff/don footwear with AE PRN and Mod I.  Additional Goals?: Yes  Long Term Goal 6: Pt will complete toileting with Mod I.  Long Term Goal 7: Pt will complete functional transfers (bed, chair, shower, toilet) with DME PRN and Mod I.  Long Term Goal 8: Pt will perform therex/therax to facilitate an increase in strength/endurance with emphasis on dynamic standing balance/tolerance > 8 mins with Mod I.  Long Term Goal 9: Pt will perform an IADL with Mod I. :                                       Eating: Eating  Assistance Needed: Independent  Comment: able to open all packages/containers,feed self  CARE Score: 6  Discharge Goal: Independent       Oral Hygiene: Oral Hygiene  Assistance Needed: Supervision or touching assistance  Comment: Pt brushed teeth seated 2* fatigue from shower.  CARE Score: 4  Discharge Goal: Independent    UB/LB Bathing: Shower/Bathe Self  Assistance Needed: Supervision or touching assistance  Comment: Pt completed a full shower seated for majority. Pt was able to complete figure four positioning to wash distal BLEs. In stance OT provided CGA as Pt washed buttocks.  CARE Score: 4  Discharge Goal:  Independent    UB Dressing: Upper Body Dressing  Assistance Needed: Supervision or touching assistance  Comment: to don sweater seated  CARE Score: 4  Discharge Goal: Independent         LB Dressing: Lower Body Dressing  Assistance Needed: Supervision or touching assistance  Comment: Pt able to thread BLEs into pants in figure four position; CGA in stance to manage up over hips  CARE Score: 4  Discharge Goal: Independent    Donning and [place] Footwear: Putting On/Taking Off Footwear  Assistance Needed: Supervision or touching assistance  Comment: Pt able to doff/don hospital socks using figure four positioning. Pt did not want to don shoes at this time d/t fatigue.  CARE Score: 4  Discharge Goal: Independent      Toileting: Toileting Hygiene  Assistance needed: Partial/moderate assistance  Comment: Per Candice PT, cues for keeping sternal precautions during bowel hygeine, managed changing incontinence brief with min assist.  CARE Score: 3      Toilet Transfers:  Toilet Transfer  Assistance needed: Supervision or touching assistance  Comment: Per Candice PT, CGA with cues for sternal precautions.  CARE Score: 4    Physical Therapy:   Short Term Goals  Time Frame for Short Term Goals: 10 days STG=LTG  Short Term Goal 1: Pt will perform bed mobility with mod I  Short Term Goal 2: Pt will perform all functional trasnfers with mod I  Short Term Goal 3: Pt will ambulate with 2ww on level surfaces 150' and unlevel surfaces 10' with mod I  Short Term Goal 4: Pt will perform curb step with 2ww and 4 steps with B rail mod I, 12 steps with supervision  Short Term Goal 5: Pt will  light object with 2ww and reacher with mod I            Bed Mobility:   Sit to Lying  Assistance Needed: Partial/moderate assistance  Comment: min assist with LE  CARE Score: 3  Discharge Goal: Independent  Roll Left and Right  Assistance Needed: Supervision or touching assistance  Comment: cues for sternal precautions  CARE Score: 4  Discharge  for her atrial arrhythmia as it it protects against new VTE.  However, it does raise her risk for spontaneous hemorrhage and GI bleeding.  Continue monitoring her hemoglobin regularly but I do not think she now will require GI prophylaxis with an H2 blocker.  She has progressed to walking within the room with a walker with assistance.  No new bruising or swelling.  Uncontrolled pain: Continuing the scheduled acetaminophen 4 times daily.  Using cryotherapy and frequent repositioning to help with pain as well.  Providing a Lidoderm patch to painful areas as needed.  Adjusting bowel intervention while on the analgesics.  I will add Motrin in an effort to control her tailbone pain.  Dysphagia: Speech pathology agrees with advancing her diet to more normal textures and thin liquids.  Continue monitoring her pulmonary parameters for signs of aspiration.    Essential hypertension: Continuing Lopressor, Lasix and Norvasc with parameters for blood pressure regulation.  Target systolic blood pressures 120-140.  Vital signs are checked at rest and with activity.  Encouraging consistent oral hydration.  Her blood pressure has trended down just below the target range on current medication.  Monitoring closely.  Paroxysmal atrial fibrillation: Monitoring her weights does not reveal any decompensation of CHF.  Providing a DOAC (Eliquis) and diuresis (Lasix).  Rate control with Cordarone and metoprolol.  Depression with anxiety: Using Xanax on an as-needed basis (a chronic medication for her) as well as Mucinex for the apprehension of dyspnea.  Continuing her diuresis as well.  Treating her in a calm and consistent environment when possible.  Involving her family in the rehab process as much as possible.

## 2025-02-07 NOTE — PROGRESS NOTES
[x] Group therapy daily progress note              Patient Name:  Rachel Mcdaniel   :  1950 MRN: [id number]  Room:  [address] [place], [place] [address] Date of Admission: 2025    Rehabilitation Diagnosis:     Atherosclerotic heart disease of native coronary artery without angina pectoris [I25.10]  CHF following cardiac surgery, postop [I97.130]    Date  2025   TIMES IN/OUT   1100/1200   Group Tx Minutes 60  minutes total    Group Ratio (therapistt o patient)   1: 3   TOTAL Tx time seen 60 mins   Variance Time    Variance Reason    [] Refusal due to   [] Medical hold/reason  [] Illness   [] Off Unit for test/procedure  [] Extra time needed to complete task  [] Other (specify)   Restrictions/Precautions Restrictions/Precautions  Restrictions/Precautions: Fall Risk, General Precautions, Surgical Protocols (sternal precautions)  Position Activity Restriction  Sternal Precautions: No Pushing, No Pulling, 5# Lifting Restrictions   Current Diet/Swallowing Issues ADULT ORAL NUTRITION SUPPLEMENT; Breakfast, Dinner; Standard High Calorie/High Protein Oral Supplement  ADULT DIET; Regular; prefers vanilla   Communication with other providers: [x] Ok to see per nursing at morning huddle  [] Medical hold and reason  [] Spoke with (team    member) regarding   Subjective observations: Pt agreeable to group session.   Pain level/location: [] Denies Pain         Alarm placed/where?  Fall Risk     [] Pt taken back to room with chair/bed alarm in place   [x] Pt taken back to room by other staff member       Total time in group = 60min   Purpose: Provide skilled group intervention to address the educational, physical, cognitive, and/or psychosocial needs of the patient  Gus BRIGITTE Mcdaniel participated in exercise activities and discussion on benefits and precautions during exercise. This provided the opportunity to have fun, build camaraderie, increase endurance, improve breathing techniques, balance, and strength. Rachel Mcdaniel performed a  variety of seated and standing activities as able, with focus on technique and safety during exercise. Also focused on warm-up, warm-down, endurance monitoring, strengthening & strategies, function, safety, and general social & communication opportunities with other group members.    Benefits: This patient benefited from this group therapy activity as an added modality to 1:1 treatment by id and addressing deficits with regard to STR, endurance, impact on overall functioning, and to max independence in a normalized distracting environment..   Patient educated on CHF diagnosis, what to expect going forward, energy conservation techniques    Functional areas targeted:   [] UB Dressing [] LB Dressing [x] Strengthening    [] Coordination  [] Mobility  [] Transfers   [] Sitting Balance [] Standing Balance   [] Adaptive Equipment Use   [] Awareness/Insight  [] Barriers  [] Safety   [] Pain mgmt  [x] Endurance    [] Sequencing    [] Communication [] Memory  [] Vision/Compensation     [] Problem Solving  [x] Social/Pragmatics  [] Attention  [] IADL's          Assessment / Impression                                                          Treatment/Activity Tolerance:   [x] Tolerated Treatment well:     [] Patient limited by fatigue/pain:       [] Patient limited by medical complications:    [] Adverse Reaction to Tx:   [] Significant change in status      Social History  Social/Functional History  Lives With: Son, Family (DIL, grandkids)  Type of Home: House  Home Layout: Two level (bedroom in basement with 13 steps to access rail on one side, has a landing.shower and toilet in basement. laundry on second floor.)  Home Access: Stairs to enter without rails  Entrance Stairs - Number of Steps: 3  Bathroom Shower/Tub: Walk-in shower  Bathroom Toilet: Standard  Bathroom Equipment: None  Bathroom Accessibility: Walker accessible  Home Equipment: None  Has the patient had two or more falls in the past year or any fall with  injury in the past year?: No  Receives Help From: Family  Prior Level of Assist for ADLs: Independent  Prior Level of Assist for Homemaking: Independent  Homemaking Responsibilities: Yes  Meal Prep Responsibility: Primary  Laundry Responsibility: Primary  Cleaning Responsibility: Secondary  Bill Paying/Finance Responsibility: Primary  Shopping Responsibility: Primary  Dependent Care Responsibility: Secondary  Health Care Management: Primary  Prior Level of Assist for Ambulation: Independent community ambulator, with or without device (no device)  Prior Level of Assist for Transfers: Independent  Active : Yes  Mode of Transportation: [facility]  Education:   Type of Occupation: Worked at [employer].  Leisure & Hobbies: Cooking, shopping, laundry, 2 dogs  Pickles and Nova, Taoism, lunch w friends.  Pt sets up meds via pill box.  Pt writes checks and utilizes auto pay.  Additional Comments: sleeps in flat bed, no exits in basement except stairs.    Objective                                                                                    Goals:  (Update in navigator)  Short Term Goals  Time Frame for Short Term Goals: STGs=LTGs:  Long Term Goals  Time Frame for Long Term Goals : 10 days or until d/c.  Long Term Goal 1: Pt will complete oral hygiene and grooming tasks with IND.  Long Term Goal 2: Pt will complete total body bathing with AE PRN and Mod I.  Long Term Goal 3: Pt will complete UB dressing with IND.  Long Term Goal 4: Pt will complete LB dressing with AE PRN and Mod I.  Long Term Goal 5: Pt will doff/don footwear with AE PRN and Mod I.  Additional Goals?: Yes  Long Term Goal 6: Pt will complete toileting with Mod I.  Long Term Goal 7: Pt will complete functional transfers (bed, chair, shower, toilet) with DME PRN and Mod I.  Long Term Goal 8: Pt will perform therex/therax to facilitate an increase in strength/endurance with emphasis on dynamic standing balance/tolerance > 8 mins with Mod

## 2025-02-08 PROCEDURE — 6370000000 HC RX 637 (ALT 250 FOR IP): Performed by: PHYSICIAN ASSISTANT

## 2025-02-08 PROCEDURE — 94150 VITAL CAPACITY TEST: CPT

## 2025-02-08 PROCEDURE — 94761 N-INVAS EAR/PLS OXIMETRY MLT: CPT

## 2025-02-08 PROCEDURE — 97116 GAIT TRAINING THERAPY: CPT

## 2025-02-08 PROCEDURE — 97530 THERAPEUTIC ACTIVITIES: CPT

## 2025-02-08 PROCEDURE — 6370000000 HC RX 637 (ALT 250 FOR IP): Performed by: PHYSICAL MEDICINE & REHABILITATION

## 2025-02-08 PROCEDURE — 97110 THERAPEUTIC EXERCISES: CPT

## 2025-02-08 PROCEDURE — 1280000000 HC REHAB R&B

## 2025-02-08 PROCEDURE — 97535 SELF CARE MNGMENT TRAINING: CPT

## 2025-02-08 RX ADMIN — ACETAMINOPHEN 650 MG: 325 TABLET ORAL at 21:49

## 2025-02-08 RX ADMIN — APIXABAN 5 MG: 5 TABLET, FILM COATED ORAL at 08:42

## 2025-02-08 RX ADMIN — BACITRACIN: 500 OINTMENT TOPICAL at 08:46

## 2025-02-08 RX ADMIN — LEVOTHYROXINE SODIUM 125 MCG: 0.12 TABLET ORAL at 05:16

## 2025-02-08 RX ADMIN — ATORVASTATIN CALCIUM 40 MG: 40 TABLET, FILM COATED ORAL at 21:48

## 2025-02-08 RX ADMIN — FUROSEMIDE 40 MG: 40 TABLET ORAL at 08:42

## 2025-02-08 RX ADMIN — ACETAMINOPHEN 650 MG: 325 TABLET ORAL at 05:16

## 2025-02-08 RX ADMIN — ACETAMINOPHEN 650 MG: 325 TABLET ORAL at 17:54

## 2025-02-08 RX ADMIN — APIXABAN 5 MG: 5 TABLET, FILM COATED ORAL at 21:49

## 2025-02-08 RX ADMIN — AMIODARONE HYDROCHLORIDE 400 MG: 200 TABLET ORAL at 08:42

## 2025-02-08 RX ADMIN — ASPIRIN 81 MG CHEWABLE TABLET 81 MG: 81 TABLET CHEWABLE at 08:42

## 2025-02-08 RX ADMIN — METOPROLOL TARTRATE 75 MG: 50 TABLET, FILM COATED ORAL at 08:42

## 2025-02-08 RX ADMIN — GUAIFENESIN 1200 MG: 600 TABLET, EXTENDED RELEASE ORAL at 08:42

## 2025-02-08 RX ADMIN — AMIODARONE HYDROCHLORIDE 400 MG: 200 TABLET ORAL at 21:49

## 2025-02-08 RX ADMIN — ALPRAZOLAM 0.25 MG: 0.5 TABLET ORAL at 21:55

## 2025-02-08 RX ADMIN — IBUPROFEN 400 MG: 400 TABLET, FILM COATED ORAL at 08:41

## 2025-02-08 RX ADMIN — IBUPROFEN 400 MG: 400 TABLET, FILM COATED ORAL at 15:40

## 2025-02-08 RX ADMIN — METOPROLOL TARTRATE 75 MG: 50 TABLET, FILM COATED ORAL at 21:49

## 2025-02-08 RX ADMIN — POTASSIUM BICARBONATE 20 MEQ: 782 TABLET, EFFERVESCENT ORAL at 08:41

## 2025-02-08 RX ADMIN — BACITRACIN: 500 OINTMENT TOPICAL at 21:46

## 2025-02-08 RX ADMIN — LATANOPROST 1 DROP: 50 SOLUTION OPHTHALMIC at 21:47

## 2025-02-08 RX ADMIN — ACETAMINOPHEN 650 MG: 325 TABLET ORAL at 10:37

## 2025-02-08 RX ADMIN — AMLODIPINE BESYLATE 2.5 MG: 5 TABLET ORAL at 08:41

## 2025-02-08 ASSESSMENT — PAIN SCALES - GENERAL
PAINLEVEL_OUTOF10: 0
PAINLEVEL_OUTOF10: 4
PAINLEVEL_OUTOF10: 2
PAINLEVEL_OUTOF10: 0
PAINLEVEL_OUTOF10: 4
PAINLEVEL_OUTOF10: 1
PAINLEVEL_OUTOF10: 3
PAINLEVEL_OUTOF10: 2
PAINLEVEL_OUTOF10: 4
PAINLEVEL_OUTOF10: 3

## 2025-02-08 ASSESSMENT — PAIN DESCRIPTION - FREQUENCY
FREQUENCY: CONTINUOUS
FREQUENCY: INTERMITTENT

## 2025-02-08 ASSESSMENT — PAIN DESCRIPTION - DESCRIPTORS
DESCRIPTORS: ACHING

## 2025-02-08 ASSESSMENT — PAIN DESCRIPTION - LOCATION
LOCATION: INCISION
LOCATION: BUTTOCKS
LOCATION: STERNUM
LOCATION: OTHER (COMMENT)

## 2025-02-08 ASSESSMENT — PAIN DESCRIPTION - ORIENTATION
ORIENTATION: MID
ORIENTATION: RIGHT;LEFT
ORIENTATION: MID

## 2025-02-08 ASSESSMENT — PAIN DESCRIPTION - PAIN TYPE
TYPE: ACUTE PAIN
TYPE: ACUTE PAIN;SURGICAL PAIN

## 2025-02-08 ASSESSMENT — PAIN DESCRIPTION - ONSET
ONSET: ON-GOING
ONSET: ON-GOING

## 2025-02-08 NOTE — FLOWSHEET NOTE
[x] daily progress note       [] discharge       Patient Name:  Rachel Mcdaniel   :  1950 MRN: [id number]  Room:  [address] [place], [place] [address] Date of Admission: 2025  Rehabilitation Diagnosis:   Atherosclerotic heart disease of native coronary artery without angina pectoris [I25.10]  CHF following cardiac surgery, postop [I97.130]       Date 2025       Day of ARU Week:  4   Time IN/-930   Individual Tx Minutes 60   Group Tx Minutes    Co-Treat Minutes    Concurrent Tx Minutes    TOTAL Tx Time Mins 60   Variance Time    Variance Reason []   Refusal due to:     []   Medical hold/reason:    []   Illness   []   Off Unit for test/procedure  []   Extra time needed to complete task  []   Therapeutic need  []   Make up mins were attempted but pt unable to complete due to (specify)  []   Other (specify):   Restrictions Restrictions/Precautions  Restrictions/Precautions: Fall Risk, General Precautions, Surgical Protocols (sternal precautions)  Position Activity Restriction  Sternal Precautions: No Pushing, No Pulling, 5# Lifting Restrictions   Communication with other providers: [x]   OK to see per nursing:     []   Spoke with team member regarding:      Subjective observations and cognitive status: Pt seen sitting up w/ RN for meds at the beginning of the session. Pt was alert and agreeable to treatment session. Pt noted some pain in her tailbone.     Pain level/location:    10       Location: [place]   Discharge recommendations  Anticipated discharge date:  tbd  Destination: []home alone   []home alone with assist PRN     [x] home w/ family      [] Continuous supervision  []SNF    [] Assisted living     [] Other:  Continued therapy: [x]C PT  []OUTPATIENT  PT   [] No Further PT  []SNF PT  Caregiver training recommended: []Yes  [] No   Equipment needs: 2ww         Bed Mobility:           [x]   Pt received out of bed   Scooting:  SBA      Transfers:    Sit--> Stand:  SBA  Stand --> Sit:   SBA  Toilet Transfer (if  device)  Prior Level of Assist for Transfers: Independent  Active : Yes  Mode of Transportation: [facility]  Education: HS  Type of Occupation: Worked at [employer].  Leisure & Hobbies: Cooking, shopping, laundry, 2 dogs  Pickles and Nova, Christian, lunch w friends.  Pt sets up meds via pill box.  Pt writes checks and utilizes auto pay.  Additional Comments: sleeps in flat bed, no exits in basement except stairs.    Objective                                                                                    Goals:  (Update in navigator)  Short Term Goals  Time Frame for Short Term Goals: 10 days STG=LTG  Short Term Goal 1: Pt will perform bed mobility with mod I  Short Term Goal 2: Pt will perform all functional trasnfers with mod I  Short Term Goal 3: Pt will ambulate with 2ww on level surfaces 150' and unlevel surfaces 10' with mod I  Short Term Goal 4: Pt will perform curb step with 2ww and 4 steps with B rail mod I, 12 steps with supervision  Short Term Goal 5: Pt will  light object with 2ww and reacher with mod I:   :        Plan of Care                                                                              Times per week: 5 days per week for a minimum of 60 minutes/day plus group as appropriate for 60 minutes.  Treatment to include Current Treatment Recommendations: Strengthening, Balance training, Functional mobility training, Transfer training, IADL training, Endurance training, Stair training, Gait training, Patient/Caregiver education & training, Safety education & training, Home exercise program, Group Therapy, Therapeutic activities, Neuromuscular re-education, Equipment evaluation, education, & procurement, Positioning    Electronically signed by   Kylie Jorge PTA, [id number]   2/8/2025, 8:13 AM

## 2025-02-08 NOTE — PLAN OF CARE
Problem: Chronic Conditions and Co-morbidities  Goal: Patient's chronic conditions and co-morbidity symptoms are monitored and maintained or improved  2/8/2025 1239 by Magan Bright LPN  Outcome: Progressing     Problem: Discharge Planning  Goal: Discharge to home or other facility with appropriate resources  2/8/2025 1239 by Magan Bright LPN  Outcome: Progressing     Problem: Safety - Adult  Goal: Free from fall injury  2/8/2025 1239 by Magan Bright LPN  Outcome: Progressing     Problem: ABCDS Injury Assessment  Goal: Absence of physical injury  2/8/2025 1239 by Magan Bright LPN  Outcome: Progressing     Problem: Pain  Goal: Verbalizes/displays adequate comfort level or baseline comfort level  2/8/2025 1239 by Magan Bright LPN  Outcome: Progressing     Problem: Nutrition Deficit:  Goal: Optimize nutritional status  2/8/2025 1239 by Magan Bright LPN  Outcome: Progressing

## 2025-02-08 NOTE — PLAN OF CARE
Problem: Discharge Planning  Goal: Discharge to home or other facility with appropriate resources  2/7/2025 2357 by Jerilyn Schafer LPN  Outcome: Progressing  2/7/2025 1015 by Stacy Lancaster LPN  Outcome: Progressing     Problem: Chronic Conditions and Co-morbidities  Goal: Patient's chronic conditions and co-morbidity symptoms are monitored and maintained or improved  2/7/2025 2357 by Jerilyn Schafer LPN  Outcome: Progressing  2/7/2025 1015 by Stacy Lancaster LPN  Outcome: Progressing     Problem: Safety - Adult  Goal: Free from fall injury  2/7/2025 2357 by Jerilyn Schafer LPN  Outcome: Progressing  2/7/2025 1015 by Stacy Lancaster LPN  Outcome: Progressing     Problem: ABCDS Injury Assessment  Goal: Absence of physical injury  2/7/2025 2357 by Jerilyn Schafer LPN  Outcome: Progressing  2/7/2025 1015 by Stacy Lancaster LPN  Outcome: Progressing     Problem: Pain  Goal: Verbalizes/displays adequate comfort level or baseline comfort level  2/7/2025 2357 by Jerilyn Schafer LPN  Outcome: Progressing  2/7/2025 1015 by Stacy Lancaster LPN  Outcome: Progressing     Problem: Nutrition Deficit:  Goal: Optimize nutritional status  2/7/2025 2357 by Jerilyn Schafer LPN  Outcome: Progressing  2/7/2025 1015 by Stacy Lancaster LPN  Outcome: Progressing

## 2025-02-08 NOTE — PROGRESS NOTES
Occupational Therapy  Physical Rehabilitation: OCCUPATIONAL THERAPY     [x] daily progress note       [] discharge       Patient Name:  Rachel Mcdaniel   :  1950 MRN: [id number]  Room:  [address] [place], [place] [address] Date of Admission: 2025  Rehabilitation Diagnosis:   Atherosclerotic heart disease of native coronary artery without angina pectoris [I25.10]  CHF following cardiac surgery, postop [I97.130]       Date 2025       Day of ARU Week:  4   Time IN/OUT 1100/1200 + 1400/1500   Individual Tx Minutes 120   Group Tx Minutes    Co-Treat Minutes    Concurrent Tx Minutes    TOTAL Tx Time Mins 120   Variance Time    Variance Reason []   Refusal due to:     []   Medical hold/reason:    []   Illness   []   Off Unit for test/procedure  []   Extra time needed to complete task  []   Therapeutic need  []   Make up mins were attempted but pt unable to complete due to (specify)  []   Other (specify):   Restrictions Restrictions/Precautions: Fall Risk, General Precautions, Surgical Protocols (sternal precautions)         Communication with other providers: [x]   OK to see per nursing:     []   Spoke with team member regarding:      Subjective observations and cognitive status: \"I would like to take a shower.\" - in AM  \"I am good about pacing myself and all of this is helpful.\" - referring to kitchen level activity in PM   Pain level/location:   3 /10       Location: sternal   Discharge recommendations  Anticipated discharge date:  TBD  Destination: []home alone   []home alone w assist prn   [x] home w/ family    [] Continuous supervision       []SNF    [] Assisted living     [] Other:   Continued therapy: [x]HHC OT  []OUTPATIENT  OT   [] No Further OT  Equipment needs: TBD     Toileting:   NA       Toilet Transfers:   NA  Device Used:    []   Standard Toilet         []   Grab Bars           []  Bedside Commode       []   Elevated Toilet          []   Other:        Bed Mobility:           []   Pt received out of bed   Rolling R/L:

## 2025-02-09 VITALS
HEART RATE: 75 BPM | RESPIRATION RATE: 18 BRPM | TEMPERATURE: 98.4 F | DIASTOLIC BLOOD PRESSURE: 54 MMHG | SYSTOLIC BLOOD PRESSURE: 112 MMHG | HEIGHT: 63 IN | BODY MASS INDEX: 23.64 KG/M2 | OXYGEN SATURATION: 97 % | WEIGHT: 133.4 LBS

## 2025-02-09 PROCEDURE — 6370000000 HC RX 637 (ALT 250 FOR IP): Performed by: PHYSICIAN ASSISTANT

## 2025-02-09 PROCEDURE — 6370000000 HC RX 637 (ALT 250 FOR IP): Performed by: PHYSICAL MEDICINE & REHABILITATION

## 2025-02-09 PROCEDURE — 94150 VITAL CAPACITY TEST: CPT

## 2025-02-09 PROCEDURE — 1280000000 HC REHAB R&B

## 2025-02-09 PROCEDURE — 94761 N-INVAS EAR/PLS OXIMETRY MLT: CPT

## 2025-02-09 RX ADMIN — ACETAMINOPHEN 650 MG: 325 TABLET ORAL at 18:00

## 2025-02-09 RX ADMIN — FUROSEMIDE 40 MG: 40 TABLET ORAL at 09:08

## 2025-02-09 RX ADMIN — AMLODIPINE BESYLATE 2.5 MG: 5 TABLET ORAL at 09:09

## 2025-02-09 RX ADMIN — APIXABAN 5 MG: 5 TABLET, FILM COATED ORAL at 09:09

## 2025-02-09 RX ADMIN — IBUPROFEN 400 MG: 400 TABLET, FILM COATED ORAL at 15:35

## 2025-02-09 RX ADMIN — LEVOTHYROXINE SODIUM 125 MCG: 0.12 TABLET ORAL at 05:36

## 2025-02-09 RX ADMIN — ATORVASTATIN CALCIUM 40 MG: 40 TABLET, FILM COATED ORAL at 21:05

## 2025-02-09 RX ADMIN — AMIODARONE HYDROCHLORIDE 400 MG: 200 TABLET ORAL at 09:08

## 2025-02-09 RX ADMIN — METOPROLOL TARTRATE 75 MG: 50 TABLET, FILM COATED ORAL at 09:08

## 2025-02-09 RX ADMIN — ACETAMINOPHEN 650 MG: 325 TABLET ORAL at 11:58

## 2025-02-09 RX ADMIN — BACITRACIN: 500 OINTMENT TOPICAL at 09:25

## 2025-02-09 RX ADMIN — ACETAMINOPHEN 650 MG: 325 TABLET ORAL at 05:36

## 2025-02-09 RX ADMIN — METOPROLOL TARTRATE 75 MG: 50 TABLET, FILM COATED ORAL at 21:05

## 2025-02-09 RX ADMIN — ACETAMINOPHEN 650 MG: 325 TABLET ORAL at 21:06

## 2025-02-09 RX ADMIN — APIXABAN 5 MG: 5 TABLET, FILM COATED ORAL at 21:06

## 2025-02-09 RX ADMIN — BACITRACIN: 500 OINTMENT TOPICAL at 21:17

## 2025-02-09 RX ADMIN — ASPIRIN 81 MG CHEWABLE TABLET 81 MG: 81 TABLET CHEWABLE at 09:08

## 2025-02-09 RX ADMIN — POTASSIUM BICARBONATE 20 MEQ: 782 TABLET, EFFERVESCENT ORAL at 09:09

## 2025-02-09 RX ADMIN — ALPRAZOLAM 0.25 MG: 0.5 TABLET ORAL at 21:09

## 2025-02-09 RX ADMIN — AMIODARONE HYDROCHLORIDE 400 MG: 200 TABLET ORAL at 21:06

## 2025-02-09 ASSESSMENT — PAIN DESCRIPTION - LOCATION
LOCATION: BUTTOCKS
LOCATION: ABDOMEN
LOCATION: SHOULDER
LOCATION: STERNUM
LOCATION: BUTTOCKS

## 2025-02-09 ASSESSMENT — PAIN DESCRIPTION - ORIENTATION
ORIENTATION: MID
ORIENTATION: MID
ORIENTATION: LEFT;RIGHT
ORIENTATION: MID
ORIENTATION: RIGHT;LEFT;MID

## 2025-02-09 ASSESSMENT — PAIN DESCRIPTION - DESCRIPTORS
DESCRIPTORS: ACHING
DESCRIPTORS: ACHING
DESCRIPTORS: ACHING;DISCOMFORT
DESCRIPTORS: ACHING
DESCRIPTORS: ACHING;DISCOMFORT

## 2025-02-09 ASSESSMENT — PAIN SCALES - GENERAL
PAINLEVEL_OUTOF10: 5
PAINLEVEL_OUTOF10: 1
PAINLEVEL_OUTOF10: 4
PAINLEVEL_OUTOF10: 2
PAINLEVEL_OUTOF10: 0
PAINLEVEL_OUTOF10: 2

## 2025-02-09 ASSESSMENT — PAIN DESCRIPTION - PAIN TYPE: TYPE: ACUTE PAIN;SURGICAL PAIN

## 2025-02-09 ASSESSMENT — PAIN DESCRIPTION - ONSET: ONSET: ON-GOING

## 2025-02-09 ASSESSMENT — PAIN DESCRIPTION - FREQUENCY: FREQUENCY: CONTINUOUS

## 2025-02-09 NOTE — PLAN OF CARE
Problem: Discharge Planning  Goal: Discharge to home or other facility with appropriate resources  2/9/2025 1046 by Magan Bright LPN  Outcome: Progressing     Problem: Chronic Conditions and Co-morbidities  Goal: Patient's chronic conditions and co-morbidity symptoms are monitored and maintained or improved  2/9/2025 1046 by Magan Bright LPN  Outcome: Progressing     Problem: Safety - Adult  Goal: Free from fall injury  2/9/2025 1046 by Magan Bright LPN  Outcome: Progressing     Problem: ABCDS Injury Assessment  Goal: Absence of physical injury  2/9/2025 1046 by Magan Bright LPN  Outcome: Progressing     Problem: Pain  Goal: Verbalizes/displays adequate comfort level or baseline comfort level  2/9/2025 1046 by Magan Bright LPN  Outcome: Progressing     Problem: Nutrition Deficit:  Goal: Optimize nutritional status  2/9/2025 1046 by Magan Bright LPN  Outcome: Progressing

## 2025-02-09 NOTE — PLAN OF CARE
Problem: Discharge Planning  Goal: Discharge to home or other facility with appropriate resources  2/8/2025 2332 by Tiffany Hdz RN  Outcome: Progressing  Flowsheets (Taken 2/8/2025 2150)  Discharge to home or other facility with appropriate resources: Identify barriers to discharge with patient and caregiver  2/8/2025 1239 by Magan Bright LPN  Outcome: Progressing     Problem: Chronic Conditions and Co-morbidities  Goal: Patient's chronic conditions and co-morbidity symptoms are monitored and maintained or improved  2/8/2025 2332 by Tiffany Hdz RN  Outcome: Progressing  Flowsheets (Taken 2/8/2025 2150)  Care Plan - Patient's Chronic Conditions and Co-Morbidity Symptoms are Monitored and Maintained or Improved: Monitor and assess patient's chronic conditions and comorbid symptoms for stability, deterioration, or improvement  2/8/2025 1239 by Magan Bright LPN  Outcome: Progressing     Problem: Safety - Adult  Goal: Free from fall injury  2/8/2025 2332 by Tiffany Hdz RN  Outcome: Progressing  2/8/2025 1239 by Magan Bright LPN  Outcome: Progressing     Problem: ABCDS Injury Assessment  Goal: Absence of physical injury  2/8/2025 2332 by Tiffany Hdz RN  Outcome: Progressing  2/8/2025 1239 by Magan Bright LPN  Outcome: Progressing     Problem: Pain  Goal: Verbalizes/displays adequate comfort level or baseline comfort level  2/8/2025 2332 by Tiffany Hdz RN  Outcome: Progressing  2/8/2025 1239 by Magan Bright LPN  Outcome: Progressing     Problem: Nutrition Deficit:  Goal: Optimize nutritional status  2/8/2025 2332 by Tiffany Hdz RN  Outcome: Progressing  2/8/2025 1239 by Magan Bright LPN  Outcome: Progressing

## 2025-02-10 ENCOUNTER — TELEPHONE (OUTPATIENT)
Dept: CARDIOLOGY CLINIC | Age: 75
End: 2025-02-10

## 2025-02-10 PROCEDURE — 94761 N-INVAS EAR/PLS OXIMETRY MLT: CPT

## 2025-02-10 PROCEDURE — 6370000000 HC RX 637 (ALT 250 FOR IP): Performed by: PHYSICAL MEDICINE & REHABILITATION

## 2025-02-10 PROCEDURE — 1280000000 HC REHAB R&B

## 2025-02-10 PROCEDURE — 6370000000 HC RX 637 (ALT 250 FOR IP): Performed by: PHYSICIAN ASSISTANT

## 2025-02-10 PROCEDURE — 99232 SBSQ HOSP IP/OBS MODERATE 35: CPT | Performed by: PHYSICAL MEDICINE & REHABILITATION

## 2025-02-10 PROCEDURE — 97110 THERAPEUTIC EXERCISES: CPT

## 2025-02-10 PROCEDURE — 97116 GAIT TRAINING THERAPY: CPT

## 2025-02-10 PROCEDURE — 97535 SELF CARE MNGMENT TRAINING: CPT

## 2025-02-10 PROCEDURE — 94150 VITAL CAPACITY TEST: CPT

## 2025-02-10 PROCEDURE — 97530 THERAPEUTIC ACTIVITIES: CPT

## 2025-02-10 RX ADMIN — METOPROLOL TARTRATE 75 MG: 50 TABLET, FILM COATED ORAL at 07:58

## 2025-02-10 RX ADMIN — IBUPROFEN 400 MG: 400 TABLET, FILM COATED ORAL at 08:02

## 2025-02-10 RX ADMIN — POTASSIUM BICARBONATE 20 MEQ: 782 TABLET, EFFERVESCENT ORAL at 07:57

## 2025-02-10 RX ADMIN — ACETAMINOPHEN 650 MG: 325 TABLET ORAL at 06:34

## 2025-02-10 RX ADMIN — APIXABAN 5 MG: 5 TABLET, FILM COATED ORAL at 21:22

## 2025-02-10 RX ADMIN — LEVOTHYROXINE SODIUM 125 MCG: 0.12 TABLET ORAL at 06:34

## 2025-02-10 RX ADMIN — APIXABAN 5 MG: 5 TABLET, FILM COATED ORAL at 07:57

## 2025-02-10 RX ADMIN — BACITRACIN: 500 OINTMENT TOPICAL at 21:25

## 2025-02-10 RX ADMIN — FUROSEMIDE 40 MG: 40 TABLET ORAL at 07:58

## 2025-02-10 RX ADMIN — AMLODIPINE BESYLATE 2.5 MG: 5 TABLET ORAL at 07:58

## 2025-02-10 RX ADMIN — ACETAMINOPHEN 650 MG: 325 TABLET ORAL at 11:28

## 2025-02-10 RX ADMIN — AMIODARONE HYDROCHLORIDE 400 MG: 200 TABLET ORAL at 07:57

## 2025-02-10 RX ADMIN — BACITRACIN: 500 OINTMENT TOPICAL at 08:36

## 2025-02-10 RX ADMIN — ATORVASTATIN CALCIUM 40 MG: 40 TABLET, FILM COATED ORAL at 21:22

## 2025-02-10 RX ADMIN — ACETAMINOPHEN 650 MG: 325 TABLET ORAL at 21:22

## 2025-02-10 RX ADMIN — ALPRAZOLAM 0.25 MG: 0.5 TABLET ORAL at 21:22

## 2025-02-10 RX ADMIN — METOPROLOL TARTRATE 75 MG: 50 TABLET, FILM COATED ORAL at 21:27

## 2025-02-10 RX ADMIN — ASPIRIN 81 MG CHEWABLE TABLET 81 MG: 81 TABLET CHEWABLE at 07:58

## 2025-02-10 RX ADMIN — ACETAMINOPHEN 650 MG: 325 TABLET ORAL at 17:24

## 2025-02-10 ASSESSMENT — PAIN SCALES - GENERAL
PAINLEVEL_OUTOF10: 2
PAINLEVEL_OUTOF10: 3
PAINLEVEL_OUTOF10: 4

## 2025-02-10 ASSESSMENT — PAIN DESCRIPTION - LOCATION
LOCATION: COCCYX
LOCATION: GENERALIZED

## 2025-02-10 ASSESSMENT — PAIN DESCRIPTION - ORIENTATION: ORIENTATION: MID

## 2025-02-10 ASSESSMENT — PAIN DESCRIPTION - FREQUENCY: FREQUENCY: CONTINUOUS

## 2025-02-10 ASSESSMENT — PAIN SCALES - WONG BAKER
WONGBAKER_NUMERICALRESPONSE: HURTS LITTLE MORE
WONGBAKER_NUMERICALRESPONSE: HURTS LITTLE MORE

## 2025-02-10 ASSESSMENT — PAIN DESCRIPTION - ONSET: ONSET: ON-GOING

## 2025-02-10 ASSESSMENT — PAIN DESCRIPTION - DESCRIPTORS
DESCRIPTORS: ACHING
DESCRIPTORS: ACHING

## 2025-02-10 ASSESSMENT — PAIN DESCRIPTION - PAIN TYPE: TYPE: ACUTE PAIN

## 2025-02-10 NOTE — PROGRESS NOTES
Occupational Therapy    Physical Rehabilitation: OCCUPATIONAL THERAPY     [x] daily progress note       [] discharge       Patient Name:  Rachel Mcdaniel   :  1950 MRN: [id number]  Room:  [address] [place], [place] [address] Date of Admission: 2025  Rehabilitation Diagnosis:   Atherosclerotic heart disease of native coronary artery without angina pectoris [I25.10]  CHF following cardiac surgery, postop [I97.130]       Date 2/10/2025       Day of ARU Week:  6   Time IN/OUT 8243-1741  [id number]   Individual Tx Minutes 60+60   Group Tx Minutes    Co-Treat Minutes    Concurrent Tx Minutes    TOTAL Tx Time Mins 120   Variance Time    Variance Reason []   Refusal due to:     []   Medical hold/reason:    []   Illness   []   Off Unit for test/procedure  []   Extra time needed to complete task  []   Therapeutic need  []   Make up mins were attempted but pt unable to complete due to (specify)  []   Other (specify):   Restrictions Restrictions/Precautions: Fall Risk, General Precautions, Surgical Protocols (sternal precautions)         Communication with other providers: [x]   OK to see per nursing:     []   Spoke with team member regarding:      Subjective observations and cognitive status: Pt sitting up in W/C on approach; pleasant and agreeable to therapy.     Pt sitting in recliner on approach with Dr. Dodd in room. Pt pleasant and agreeable to therapy.      Pain level/location:    /10       Location:    Discharge recommendations  Anticipated discharge date:  TBD   Destination: []home alone   []home alone w assist prn   [x] home w/ family    [] Continuous supervision       []SNF    [] Assisted living     [] Other:   Continued therapy: [x]HHC OT  []OUTPATIENT  OT   [] No Further OT  Equipment needs: none        ADLs:    Eating: Eating  Assistance Needed: Independent  Comment: able to open packages/containers, feed self  CARE Score: 6  Discharge Goal: Independent       Oral Hygiene: Oral Hygiene  Assistance Needed: Independent  Comment:                                                  Goals:  (Update in navigator)  Short Term Goals  Time Frame for Short Term Goals: STGs=LTGs:  Long Term Goals  Time Frame for Long Term Goals : 10 days or until d/c.  Long Term Goal 1: Pt will complete oral hygiene and grooming tasks with IND.  Long Term Goal 2: Pt will complete total body bathing with AE PRN and Mod I.  Long Term Goal 3: Pt will complete UB dressing with IND.  Long Term Goal 4: Pt will complete LB dressing with AE PRN and Mod I.  Long Term Goal 5: Pt will doff/don footwear with AE PRN and Mod I.  Additional Goals?: Yes  Long Term Goal 6: Pt will complete toileting with Mod I.  Long Term Goal 7: Pt will complete functional transfers (bed, chair, shower, toilet) with DME PRN and Mod I.  Long Term Goal 8: Pt will perform therex/therax to facilitate an increase in strength/endurance with emphasis on dynamic standing balance/tolerance > 8 mins with Mod I.  Long Term Goal 9: Pt will perform an IADL with Mod I.:        Plan of Care                                                                              Times per week: 5 days per week for a minimum of 60 minutes/day plus group as appropriate for 60 minutes.  Treatment to include Occupational Therapy Plan  Current Treatment Recommendations: Balance training, Functional mobility training, Strengthening, Endurance training, Pain management, Safety education & training, Patient/Caregiver education & training, Equipment evaluation, education, & procurement, Positioning, Self-Care / ADL, Home management training, Cognitive/Perceptual training, Group Therapy    Electronically signed by   GAYLE Vogel,  2/10/2025, 3:50 PM

## 2025-02-10 NOTE — PLAN OF CARE
Problem: Discharge Planning  Goal: Discharge to home or other facility with appropriate resources  Outcome: Progressing     Problem: Chronic Conditions and Co-morbidities  Goal: Patient's chronic conditions and co-morbidity symptoms are monitored and maintained or improved  Outcome: Progressing     Problem: Safety - Adult  Goal: Free from fall injury  Outcome: Progressing     Problem: ABCDS Injury Assessment  Goal: Absence of physical injury  Outcome: Progressing     Problem: Pain  Goal: Verbalizes/displays adequate comfort level or baseline comfort level  Outcome: Progressing     Problem: Nutrition Deficit:  Goal: Optimize nutritional status  Outcome: Progressing

## 2025-02-10 NOTE — TELEPHONE ENCOUNTER
Patient left a voice mail to set up a   Echo , called back asking for a call   Back to the office

## 2025-02-10 NOTE — PLAN OF CARE
Problem: Chronic Conditions and Co-morbidities  Goal: Patient's chronic conditions and co-morbidity symptoms are monitored and maintained or improved  2/10/2025 0806 by Maliha Pathak LPN  Outcome: Progressing    Problem: Safety - Adult  Goal: Free from fall injury  2/10/2025 0806 by Maliha Pathak LPN  Outcome: Progressing

## 2025-02-10 NOTE — PROGRESS NOTES
Physical Therapy  [x] daily progress note       [] discharge       Patient Name:  Rachel Mcdaniel   :  1950 MRN: [id number]  Room:  [address] [place], [place] [address] Date of Admission: 2025  Rehabilitation Diagnosis:   Atherosclerotic heart disease of native coronary artery without angina pectoris [I25.10]  CHF following cardiac surgery, postop [I97.130]       Date 2/10/2025       Day of ARU Week:  6   Time IN/OUT 1140/1155   Individual Tx Minutes 15   Group Tx Minutes    Co-Treat Minutes    Concurrent Tx Minutes    TOTAL Tx Time Mins 15   Variance Time    Variance Time []   Refusal due to:     []   Medical hold/reason:    []   Illness   []   Off Unit for test/procedure  []   Extra time needed to complete task  []   Therapeutic need  []   Other (specify):   Restrictions Restrictions/Precautions  Restrictions/Precautions: Fall Risk, General Precautions, Surgical Protocols (sternal precautions)  Position Activity Restriction  Sternal Precautions: No Pushing, No Pulling, 5# Lifting Restrictions   Interdisciplinary communication [x]   Cleared for therapy per nursing     []   RN notified about issues during session  []   RN updated on pt performance  []   Spoke with   []   Spoke with OT  []   Spoke with MD  []   Other:    Subjective observations and cognitive status: Pt in supine upon approach. Agreeable to partial session     Pain level/location:  0  /10       Location:    Discharge recommendations  Anticipated discharge date:  tbd  Destination: []home alone   []home alone with assist PRN     [] home w/ family      [] Continuous supervision  []SNF    [] Assisted living     [] Other:  Continued therapy: []HHC PT  []OUTPATIENT  PT   [] No Further PT  []SNF PT  Caregiver training recommended: []Yes  [] No   Equipment needs: 2ww     Bed Mobility:           []   Pt received out of bed   Rolling R/L:  mod I  Lying --> Sit:  CGA with cues for precautions  Sit --> lying:  supervision with good adherence to precautions  Bed  muscle are stronger allowing for improved bed mobility. Pt tolerated increased reps for cardiac ex with good tolerance today     Treatment/Activity Tolerance:   [x] Tolerated treatment with no adverse effects    [] Patient limited by fatigue  [] Patient limited by pain   [] Patient limited by medical complications:    [] Adverse reaction to Tx:   [] Significant change in status    Barrier/s to progress/learning:   []   None  []   Cognition  []   Hearing deficit  []   Pre-morbid mental/psychological status   []   Motivation  []   Communication  [x]   Anxiety  []   Vision deficit  []   Attention  []   Other:      Safety:       [x]  bed alarm set    []  chair alarm set    []  Pt refused alarms                []  Telesitter activated      [x]  Gait belt used during tx session      []other:         Number of Minutes/Billable Intervention  Gait Training    Therapeutic Exercise 15   Neuro Re-Ed    Therapeutic Activity    Wheelchair Propulsion    Group    Other:    TOTAL 15         Social History  Social/Functional History  Lives With: Son, Family (DIL, grandkids)  Type of Home: House  Home Layout: Two level (bedroom in basement with 13 steps to access rail on one side, has a landing.shower and toilet in basement. laundry on second floor.)  Home Access: Stairs to enter without rails  Entrance Stairs - Number of Steps: 3  Bathroom Shower/Tub: Walk-in shower  Bathroom Toilet: Standard  Bathroom Equipment: None  Bathroom Accessibility: Walker accessible  Home Equipment: None  Has the patient had two or more falls in the past year or any fall with injury in the past year?: No  Receives Help From: Family  Prior Level of Assist for ADLs: Independent  Prior Level of Assist for Homemaking: Independent  Homemaking Responsibilities: Yes  Meal Prep Responsibility: Primary  Laundry Responsibility: Primary  Cleaning Responsibility: Secondary  Bill Paying/Finance Responsibility: Primary  Shopping Responsibility: Primary  Dependent Care

## 2025-02-10 NOTE — PATIENT CARE CONFERENCE
ACUTE REHAB TEAM CONFERENCE SUMMARY   [facility]    NAME: Rachel Mcdaniel  : 1950 ADMIT DATE: 2025    Rehab Admitting Dx:Atherosclerotic heart disease of native coronary artery without angina pectoris [I25.10]  CHF following cardiac surgery, postop [I97.130]  Patient Comorbid Conditions:   Active Hospital Problems    Diagnosis Date Noted    Generalized weakness [R53.1] 2025    Gait disturbance [R26.9] 2025    Uncontrolled pain [R52] 2025    Acute blood loss anemia [D62] 2025    Oropharyngeal dysphagia [R13.12] 2025    Paroxysmal atrial fibrillation (HCC) [I48.0] 2025    Depression with anxiety [F41.8] 2025    History of breast cancer [Z85.3] 2025    CHF following cardiac surgery, postop [I97.130] 2025    Essential hypertension [I10]      Date: 2025    CASE MANAGEMENT  Current issues/needs regarding patient and family discharge status:  2-level, 3 LISBETH, 13 steps inside, [facility] in [place], declined Meds to Bed, No home DME  Patient and family goal: Home with family    PHYSICAL THERAPY (Updated in QI)  Short Term Goals  Time Frame for Short Term Goals: 10 days STG=LTG  Short Term Goal 1: Pt will perform bed mobility with mod I  Short Term Goal 2: Pt will perform all functional trasnfers with mod I  Short Term Goal 3: Pt will ambulate with 2ww on level surfaces 150' and unlevel surfaces 10' with mod I  Short Term Goal 4: Pt will perform curb step with 2ww and 4 steps with B rail mod I, 12 steps with supervision  Short Term Goal 5: Pt will  light object with 2ww and reacher with mod I          Impairments/deficits, barriers:    Body Structures, Functions, Activity Limitations Requiring Skilled Therapeutic Intervention: Decreased functional mobility , Decreased body mechanics, Decreased strength, Decreased safe awareness, Decreased cognition, Decreased endurance, Decreased sensation, Decreased balance, Decreased high-level  reduced assist  [] Pt will improve pain mgmt for maximum participation in tx program  [] Pt will improve communication to get basic needs met on unit  [] Pt will improve swallowing for safe diet advancement with use of strategies  [x]  Plan for discharge to home.  []  Overall team goal being targeted this week:      Patient Strengths: Motivated, Cooperative, and Pleasant    Justification for Continued Stay  Based on my medical assessment of the patient and review of information from the interdisciplinary team as part of this weekly team conference, the patient continues to meet the following criteria for IRF level of care:  The patient requires active and ongoing intervention of multiple therapy disciplines  The patient requires and intensive rehabilitation therapy program  The patient requires continued physician supervision by a rehabilitation physician  The patient requires 24 hours rehab nursing care  The patient requires an intensive and coordinated interdisciplinary team approach to the delivery of rehabilitative care.     Assessment/Plan   [x]  The patient is making good progression towards their long term goals and is actively participating in and has a reasonable expectation to continue to benefit from the intensive rehabilitation therapy program   []  The estimated discharge date has been changed from initial team conference due to:   []  The estimated discharge destination has been changed from initial team conference due to:         Ongoing tx following discharge: [x]HHC PT OT NSG   []OUTPATIENT     [] No Further Treatment     [] Family/Caregiver Training  []  Transitional Living Arrangement   [] Home Assessment (date  )     [] Family Conference   []  Therapeutic Pass       []  Other: (specify)    Estimated Discharge Date: 2/14/25    Estimated Discharge Destination: []home alone   []home alone with assist prn  []Continuous supervision [x]Return home with s/o/spouse/family   [] Assisted living    []SNF      Team members participating in today's conference.    [x] SUZANNE Dodd, Medical Director    []  Dr Usama Ramos, Covering Medical Director  []  Dr Tian Olvera, Covering Medical Director    [x] Miky Rios, DPT,         [] Nela Romero, RN Nurse Manager   [] Paul Dinero RN Nurse Manager     []  Lelia Gonzalez, PT  [x]  Esther Hayes, PT       [x] Ruth Ann Joe, OT   [] Darrin Cary, OT  [] Ada Padilla, OT     [x]  Gris Tapia, BUFFY    []  Rona French, BUFFY   [] Fatimah Grewal, BUFFY     []Yajaira Mclean,   [x]Esther OSCAR,      [x] Jer Akins RN   [] Bety Villalta RN    [] Mac Alford, YOBANI    [] Alejandra Naranjo RN []     I have led this Team Conference and agree with the plan,  SUZANNE Dodd MD, 2/11/2025, 1:05 PM  []   I, the Medical Director, was required to lead today's conference via telephone due to an unanticipated scheduling conflict.  I agree with the decisions made by the inter-disciplinary team at today's meeting.      Goals have been updated to reflect recent status.    Team conference note transcribed this date by: Gris Tapia MA, CCC-SLP, Therapy Coordinator

## 2025-02-10 NOTE — PROGRESS NOTES
3  Discharge Goal: Independent  Roll Left and Right  Assistance Needed: Supervision or touching assistance  Comment: sba with vc for sternal precautions  CARE Score: 4  Discharge Goal: Independent  Lying to Sitting on Side of Bed  Assistance Needed: Supervision or touching assistance  Comment: sup with vc for sternal precautions  CARE Score: 4  Discharge Goal: Independent    Transfers:    Sit to Stand  Assistance Needed: Supervision or touching assistance  Comment: sup and vc for sternal precautions  CARE Score: 4  Discharge Goal: Independent  Chair/Bed-to-Chair Transfer  Assistance Needed: Supervision or touching assistance  Comment: sba/supervisiion +vc for sternal precautions  CARE Score: 4  Discharge Goal: Independent  Toilet Transfer  Assistance needed: Supervision or touching assistance  Comment: CGA with cues for sternal precautions  CARE Score: 4  Car Transfer  Assistance Needed: Supervision or touching assistance  Comment: sup with fww  CARE Score: 4  Discharge Goal: Independent    Ambulation:    Walking Ability  Does the Patient Walk?: Yes     Walk 10 Feet  Assistance Needed: Supervision or touching assistance  Comment: sba + fww  CARE Score: 4  Discharge Goal: Independent     Walk 50 Feet with Two Turns  Assistance Needed: Supervision or touching assistance  Comment: sba + fww  CARE Score: 4  Discharge Goal: Independent     Walk 150 Feet  Assistance Needed: Supervision or touching assistance  Comment: sba + fww and 230 ft max today  Reason if not Attempted: Not attempted due to medical condition or safety concerns  CARE Score: 4  Discharge Goal: Independent     Walking 10 Feet on Uneven Surfaces  Assistance Needed: Supervision or touching assistance  Comment: cga/sba with fww  CARE Score: 4  Discharge Goal: Independent     1 Step (Curb)  Assistance Needed: Supervision or touching assistance  Comment: cga + fww  CARE Score: 4  Discharge Goal: Independent     4 Steps  Assistance Needed: Supervision or  B rails  Reason if not Attempted: Not attempted due to medical condition or safety concerns  CARE Score: 4  Discharge Goal: Independent     12 Steps  Comment: 10 steps max today with B rail use  Reason if not Attempted: Not attempted due to medical condition or safety concerns  CARE Score: 88  Discharge Goal: Supervision or touching assistance       Wheelchair:  w/c Ability: Wheelchair Ability  Uses a Wheelchair and/or Scooter?: No                Balance:        Object: Picking Up Object  Assistance Needed: Supervision or touching assistance  Comment: sba with long handle reacher  CARE Score: 4  Discharge Goal: Independent    I      Exam:    Blood pressure (!) 102/52, pulse 69, temperature 97.7 °F (36.5 °C), temperature source Oral, resp. rate 16, height 1.6 m (5' 2.99\"), weight 60.7 kg (133 lb 13.1 oz), SpO2 96%.    General: ***    HEENT: ***    Pulmonary: ***    Cardiac: ***    Abdomen: Patient's abdomen is soft and nondistended.  Bowel sounds were present throughout.  There was no rebound, guarding or masses noted.    Upper extremities: ***    Lower extremities: ***    Sitting balance was ***.  Standing balance was ***.    Lab Results   Component Value Date    WBC 13.1 (H) 02/06/2025    HGB 9.2 (L) 02/06/2025    HCT 28.9 (L) 02/06/2025    MCV 94.4 02/06/2025     (H) 02/06/2025     Lab Results   Component Value Date    INR 1.1 02/04/2025    INR 1.2 02/02/2025    INR 1.3 01/29/2025    PROTIME 14.1 02/04/2025    PROTIME 15.3 (H) 02/02/2025    PROTIME 16.2 (H) 01/29/2025     Lab Results   Component Value Date    CREATININE 0.6 02/05/2025    BUN 13 02/05/2025     02/05/2025    K 3.9 02/05/2025     02/05/2025    CO2 26 02/05/2025     Lab Results   Component Value Date    ALT 13 01/29/2025    AST 48 (H) 01/29/2025    ALKPHOS 37 (L) 01/29/2025    BILITOT 0.3 01/29/2025       Expected length of stay  prior to a supervised level of function for discharge home with a walker and HHC OT/PT is  ***    Recommendations:    ***

## 2025-02-10 NOTE — PROGRESS NOTES
Physical Therapy    [x] daily progress note       [] discharge       Patient Name:  Rachel Mcdaniel   :  1950 MRN: [id number]  Room:  [address] [place] [address] Date of Admission: 2025  Rehabilitation Diagnosis:   Atherosclerotic heart disease of native coronary artery without angina pectoris [I25.10]  CHF following cardiac surgery, postop [I97.130]       Date 2/10/2025       Day of ARU Week:  6   Time IN/OUT 3143-6687   Individual Tx Minutes 45   Group Tx Minutes    Co-Treat Minutes    Concurrent Tx Minutes    TOTAL Tx Time Mins 45   Variance Time Candice PT saw pt or 5 min earlier today   Variance Reason []   Refusal due to:     []   Medical hold/reason:    []   Illness   []   Off Unit for test/procedure  []   Extra time needed to complete task  []   Therapeutic need  []   Make up mins were attempted but pt unable to complete due to (specify)  []   Other (specify):   Restrictions Restrictions/Precautions  Restrictions/Precautions: Fall Risk, General Precautions, Surgical Protocols (sternal precautions)  Position Activity Restriction  Sternal Precautions: No Pushing, No Pulling, 5# Lifting Restrictions   Communication with other providers: []   OK to see per nursing:     []   Spoke with team member regarding:      Subjective observations and cognitive status: Pt sitting up in w/c wake and talking with medical staff. She is agreeable to therapy      Pain level/location:  0  /10       Location:    Discharge recommendations  Anticipated discharge date:  TBD  Destination: []home alone   []home alone with assist PRN     [] home w/ family      [] Continuous supervision  []SNF    [] Assisted living     [] Other:   Continued therapy: []HHC PT  []OUTPATIENT  PT   [] No Further PT  Equipment needs: Rachel Mcdaniel requires the assistance of a wheeled walker to successfully ambulate from room to room at home to allow completion of daily living tasks such as: bathing, toileting, dressing and grooming.  A wheeled walker is necessary

## 2025-02-11 PROCEDURE — 97530 THERAPEUTIC ACTIVITIES: CPT

## 2025-02-11 PROCEDURE — 97110 THERAPEUTIC EXERCISES: CPT

## 2025-02-11 PROCEDURE — 94761 N-INVAS EAR/PLS OXIMETRY MLT: CPT

## 2025-02-11 PROCEDURE — 6370000000 HC RX 637 (ALT 250 FOR IP): Performed by: PHYSICAL MEDICINE & REHABILITATION

## 2025-02-11 PROCEDURE — 97116 GAIT TRAINING THERAPY: CPT

## 2025-02-11 PROCEDURE — 99232 SBSQ HOSP IP/OBS MODERATE 35: CPT | Performed by: PHYSICAL MEDICINE & REHABILITATION

## 2025-02-11 PROCEDURE — 1280000000 HC REHAB R&B

## 2025-02-11 PROCEDURE — 97535 SELF CARE MNGMENT TRAINING: CPT

## 2025-02-11 PROCEDURE — 6370000000 HC RX 637 (ALT 250 FOR IP): Performed by: PHYSICIAN ASSISTANT

## 2025-02-11 PROCEDURE — 94150 VITAL CAPACITY TEST: CPT

## 2025-02-11 RX ADMIN — ACETAMINOPHEN 650 MG: 325 TABLET ORAL at 10:59

## 2025-02-11 RX ADMIN — FUROSEMIDE 40 MG: 40 TABLET ORAL at 09:14

## 2025-02-11 RX ADMIN — ACETAMINOPHEN 650 MG: 325 TABLET ORAL at 05:59

## 2025-02-11 RX ADMIN — AMIODARONE HYDROCHLORIDE 200 MG: 200 TABLET ORAL at 09:13

## 2025-02-11 RX ADMIN — BACITRACIN: 500 OINTMENT TOPICAL at 09:20

## 2025-02-11 RX ADMIN — IBUPROFEN 400 MG: 400 TABLET, FILM COATED ORAL at 13:58

## 2025-02-11 RX ADMIN — POTASSIUM BICARBONATE 20 MEQ: 782 TABLET, EFFERVESCENT ORAL at 09:14

## 2025-02-11 RX ADMIN — ACETAMINOPHEN 650 MG: 325 TABLET ORAL at 16:40

## 2025-02-11 RX ADMIN — LEVOTHYROXINE SODIUM 125 MCG: 0.12 TABLET ORAL at 05:59

## 2025-02-11 RX ADMIN — AMLODIPINE BESYLATE 2.5 MG: 5 TABLET ORAL at 09:14

## 2025-02-11 RX ADMIN — METOPROLOL TARTRATE 75 MG: 50 TABLET, FILM COATED ORAL at 09:15

## 2025-02-11 RX ADMIN — APIXABAN 5 MG: 5 TABLET, FILM COATED ORAL at 21:55

## 2025-02-11 RX ADMIN — APIXABAN 5 MG: 5 TABLET, FILM COATED ORAL at 09:13

## 2025-02-11 RX ADMIN — METOPROLOL TARTRATE 75 MG: 50 TABLET, FILM COATED ORAL at 21:55

## 2025-02-11 RX ADMIN — IBUPROFEN 400 MG: 400 TABLET, FILM COATED ORAL at 01:31

## 2025-02-11 RX ADMIN — ATORVASTATIN CALCIUM 40 MG: 40 TABLET, FILM COATED ORAL at 21:56

## 2025-02-11 RX ADMIN — ALPRAZOLAM 0.25 MG: 0.5 TABLET ORAL at 22:04

## 2025-02-11 RX ADMIN — IBUPROFEN 400 MG: 400 TABLET, FILM COATED ORAL at 09:14

## 2025-02-11 RX ADMIN — LATANOPROST 1 DROP: 50 SOLUTION OPHTHALMIC at 21:55

## 2025-02-11 RX ADMIN — ASPIRIN 81 MG CHEWABLE TABLET 81 MG: 81 TABLET CHEWABLE at 09:14

## 2025-02-11 RX ADMIN — ACETAMINOPHEN 650 MG: 325 TABLET ORAL at 21:56

## 2025-02-11 ASSESSMENT — PAIN SCALES - GENERAL
PAINLEVEL_OUTOF10: 3
PAINLEVEL_OUTOF10: 2
PAINLEVEL_OUTOF10: 0
PAINLEVEL_OUTOF10: 2
PAINLEVEL_OUTOF10: 0
PAINLEVEL_OUTOF10: 2
PAINLEVEL_OUTOF10: 2
PAINLEVEL_OUTOF10: 3
PAINLEVEL_OUTOF10: 4

## 2025-02-11 ASSESSMENT — PAIN DESCRIPTION - FREQUENCY
FREQUENCY: CONTINUOUS

## 2025-02-11 ASSESSMENT — PAIN DESCRIPTION - LOCATION
LOCATION: COCCYX
LOCATION: SCROTUM

## 2025-02-11 ASSESSMENT — PAIN DESCRIPTION - PAIN TYPE
TYPE: CHRONIC PAIN

## 2025-02-11 ASSESSMENT — PAIN DESCRIPTION - DESCRIPTORS
DESCRIPTORS: ACHING

## 2025-02-11 ASSESSMENT — PAIN DESCRIPTION - ORIENTATION
ORIENTATION: MID

## 2025-02-11 ASSESSMENT — PAIN - FUNCTIONAL ASSESSMENT
PAIN_FUNCTIONAL_ASSESSMENT: ACTIVITIES ARE NOT PREVENTED

## 2025-02-11 ASSESSMENT — PAIN DESCRIPTION - ONSET
ONSET: ON-GOING

## 2025-02-11 ASSESSMENT — PAIN SCALES - WONG BAKER
WONGBAKER_NUMERICALRESPONSE: NO HURT
WONGBAKER_NUMERICALRESPONSE: NO HURT

## 2025-02-11 NOTE — PROGRESS NOTES
Physical Therapy  [x] daily progress note       [] discharge       Patient Name:  Rachel Mcdaniel   :  1950 MRN: [id number]  Room:  [address] [place], [place] [address] Date of Admission: 2025  Rehabilitation Diagnosis:   Atherosclerotic heart disease of native coronary artery without angina pectoris [I25.10]  CHF following cardiac surgery, postop [I97.130]       Date 2025       Day of ARU Week:  7   Time IN/OUT 11:00-12:00   Individual Tx Minutes 60   TOTAL Tx Time Mins 60   Restrictions Restrictions/Precautions  Restrictions/Precautions: Fall Risk, General Precautions, Surgical Protocols (sternal precautions)  Position Activity Restriction  Sternal Precautions: No Pushing, No Pulling, 5# Lifting Restrictions   Communication with other providers: [x]   OK to see per nursing:     []   Spoke with team member regarding:      Subjective observations and cognitive status: Pt seen resting in recliner. Pt agreeable to therapy     Pain level/location:    0/10       Location: N/A   Discharge recommendations  Anticipated discharge date:  TBD  Destination: []home alone   []home alone with assist PRN     [] home w/ family      [] Continuous supervision  []SNF    [] Assisted living     [] Other:   Continued therapy: []HHC PT  []OUTPATIENT  PT   [] No Further PT  Equipment needs: TBD         Bed Mobility:           [x]   Pt received out of bed       Transfers:    Sit--> Stand:  distant supervision  Stand --> Sit:  distant supervision   Chair-->Bed/Bed --> Chair:   distant supervision  Car Transfers:  distant supervision  Toilet Transfer (if applicable): distant supervision  Assistive device required for transfer:   2WW    Gait:    Distance:  364' + 157' + 220'   Assistance:  distant supervision  Device:  2WW  Gait Quality:  normal reciprocal      Stairs   # Completed:  4 6in steps  Assistance:  SBA  Supportive Device:  L handrail ascending    Uneven Surfaces:       Assistance:    SBA  Device:    2WW  Surfaces Completed:   [x]  Green mat

## 2025-02-11 NOTE — CARE COORDINATION
LISW met with patient and provided written communication following Care Conference. Spoke with son by phone. LISW informed patient of recommendations for 2WW, SC, HHC PT, OT. Patient and son confirmed they have a SC at home. Patient requested 2WW be ordered. Patient verbalized understanding and will choose an agency closer to discharge. Whiteboard updated.     Patient provided with list of Medicare participating C in the geographic area of the patient served. Patient selected [facility] and was provided with a comparative data handout from [employer] Compare’s website. The patient (and/or Family) was educated on the quality outcomes for each provider. Patient (and/or Family) demonstrated understanding. Per patient/family request, referral made to [facility].      D/C Plan:  Estimated Date: Feb 14  DME: 2WW (Agency [facility])  HHC: PT, OT (Agency [facility])  To: Home with family (son will transport)    Order for 2WW was faxed to [facility].

## 2025-02-11 NOTE — PLAN OF CARE
Problem: Chronic Conditions and Co-morbidities  Goal: Patient's chronic conditions and co-morbidity symptoms are monitored and maintained or improved  2/11/2025 1258 by Stephenie Streeter RN  Outcome: Progressing  2/11/2025 0239 by Carina Saucedo LPN  Outcome: Progressing     Problem: Safety - Adult  Goal: Free from fall injury  2/11/2025 1258 by Stephenie Streeter RN  Outcome: Progressing  2/11/2025 0239 by Carina Saucedo LPN  Outcome: Progressing     Problem: ABCDS Injury Assessment  Goal: Absence of physical injury  2/11/2025 1258 by Stephenie Streeter RN  Outcome: Progressing  2/11/2025 0239 by Carina Saucedo LPN  Outcome: Progressing     Problem: Pain  Goal: Verbalizes/displays adequate comfort level or baseline comfort level  2/11/2025 1258 by Stephenie Streeter RN  Outcome: Progressing  2/11/2025 0239 by Carina Saucedo LPN  Outcome: Progressing     Problem: Nutrition Deficit:  Goal: Optimize nutritional status  2/11/2025 1258 by Stepheine Streeter RN  Outcome: Progressing  2/11/2025 0239 by Carina Saucedo LPN  Outcome: Progressing

## 2025-02-11 NOTE — PROGRESS NOTES
Occupational Therapy    Physical Rehabilitation: OCCUPATIONAL THERAPY     [x] daily progress note       [] discharge       Patient Name:  Rachel Mcdaniel   :  1950 MRN: [id number]  Room:  [address] [place], [place] [address] Date of Admission: 2025  Rehabilitation Diagnosis:   Atherosclerotic heart disease of native coronary artery without angina pectoris [I25.10]  CHF following cardiac surgery, postop [I97.130]       Date 2025       Day of ARU Week:  7   Time IN/OUT 8988-3849  [id number]   Individual Tx Minutes 60+60   Group Tx Minutes    Co-Treat Minutes    Concurrent Tx Minutes    TOTAL Tx Time Mins 120   Variance Time    Variance Reason []   Refusal due to:     []   Medical hold/reason:    []   Illness   []   Off Unit for test/procedure  []   Extra time needed to complete task  []   Therapeutic need  []   Make up mins were attempted but pt unable to complete due to (specify)  []   Other (specify):   Restrictions Restrictions/Precautions: Fall Risk, General Precautions, Surgical Protocols (sternal precautions)         Communication with other providers: [x]   OK to see per nursing:     []   Spoke with team member regarding:      Subjective observations and cognitive status: Pt sitting up in W/C on approach; Pt was already dressed and ready for the day. Pt declined ADL session. Pt      Pain level/location:   2 /10       Location: [facility]    Discharge recommendations  Anticipated discharge date:  TBD   Destination: []home alone   []home alone w assist prn   [x] home w/ family    [] Continuous supervision       []SNF    [] Assisted living     [] Other:   Continued therapy: [x]HHC OT  []OUTPATIENT  OT   [] No Further OT  Equipment needs: none        Toileting: Toileting Hygiene  Assistance needed: Independent  Comment: Mod I for clothing management and bladder hygiene  CARE Score: 6      Toilet Transfers:   Mod I  Toilet Transfer  Assistance needed: Independent  Comment: Mod I c RW, good compliance with sternal

## 2025-02-12 LAB
HCO3 ARTERIAL: 23.9 MMOL/L (ref 21–28)
NEGATIVE BASE EXCESS, ART: 1.8 MMOL/L (ref 0–3)
PCO2 ARTERIAL: 43.9 MMHG (ref 32–45)
PH ARTERIAL: 7.35 (ref 7.35–7.45)
PO2 ARTERIAL: 78 MMHG (ref 83–108)

## 2025-02-12 PROCEDURE — 97110 THERAPEUTIC EXERCISES: CPT

## 2025-02-12 PROCEDURE — 6370000000 HC RX 637 (ALT 250 FOR IP): Performed by: PHYSICAL MEDICINE & REHABILITATION

## 2025-02-12 PROCEDURE — 6370000000 HC RX 637 (ALT 250 FOR IP): Performed by: PHYSICIAN ASSISTANT

## 2025-02-12 PROCEDURE — 97535 SELF CARE MNGMENT TRAINING: CPT

## 2025-02-12 PROCEDURE — 94150 VITAL CAPACITY TEST: CPT

## 2025-02-12 PROCEDURE — 97116 GAIT TRAINING THERAPY: CPT

## 2025-02-12 PROCEDURE — 1280000000 HC REHAB R&B

## 2025-02-12 PROCEDURE — 97530 THERAPEUTIC ACTIVITIES: CPT

## 2025-02-12 PROCEDURE — 99232 SBSQ HOSP IP/OBS MODERATE 35: CPT | Performed by: PHYSICAL MEDICINE & REHABILITATION

## 2025-02-12 PROCEDURE — 94761 N-INVAS EAR/PLS OXIMETRY MLT: CPT

## 2025-02-12 RX ADMIN — ACETAMINOPHEN 650 MG: 325 TABLET ORAL at 17:06

## 2025-02-12 RX ADMIN — POTASSIUM BICARBONATE 20 MEQ: 782 TABLET, EFFERVESCENT ORAL at 09:01

## 2025-02-12 RX ADMIN — BACITRACIN: 500 OINTMENT TOPICAL at 09:07

## 2025-02-12 RX ADMIN — LEVOTHYROXINE SODIUM 125 MCG: 0.12 TABLET ORAL at 05:45

## 2025-02-12 RX ADMIN — APIXABAN 5 MG: 5 TABLET, FILM COATED ORAL at 09:02

## 2025-02-12 RX ADMIN — IBUPROFEN 400 MG: 400 TABLET, FILM COATED ORAL at 09:01

## 2025-02-12 RX ADMIN — METOPROLOL TARTRATE 75 MG: 50 TABLET, FILM COATED ORAL at 09:02

## 2025-02-12 RX ADMIN — FUROSEMIDE 40 MG: 40 TABLET ORAL at 09:02

## 2025-02-12 RX ADMIN — METOPROLOL TARTRATE 75 MG: 50 TABLET, FILM COATED ORAL at 20:16

## 2025-02-12 RX ADMIN — ATORVASTATIN CALCIUM 40 MG: 40 TABLET, FILM COATED ORAL at 20:16

## 2025-02-12 RX ADMIN — LATANOPROST 1 DROP: 50 SOLUTION OPHTHALMIC at 20:19

## 2025-02-12 RX ADMIN — ALPRAZOLAM 0.25 MG: 0.5 TABLET ORAL at 22:56

## 2025-02-12 RX ADMIN — ASPIRIN 81 MG CHEWABLE TABLET 81 MG: 81 TABLET CHEWABLE at 09:02

## 2025-02-12 RX ADMIN — ACETAMINOPHEN 650 MG: 325 TABLET ORAL at 11:14

## 2025-02-12 RX ADMIN — AMIODARONE HYDROCHLORIDE 200 MG: 200 TABLET ORAL at 09:02

## 2025-02-12 RX ADMIN — APIXABAN 5 MG: 5 TABLET, FILM COATED ORAL at 20:16

## 2025-02-12 RX ADMIN — AMLODIPINE BESYLATE 2.5 MG: 5 TABLET ORAL at 09:01

## 2025-02-12 RX ADMIN — ACETAMINOPHEN 650 MG: 325 TABLET ORAL at 20:16

## 2025-02-12 RX ADMIN — BACITRACIN: 500 OINTMENT TOPICAL at 20:19

## 2025-02-12 RX ADMIN — ACETAMINOPHEN 650 MG: 325 TABLET ORAL at 05:45

## 2025-02-12 ASSESSMENT — PAIN DESCRIPTION - DESCRIPTORS
DESCRIPTORS: ACHING

## 2025-02-12 ASSESSMENT — PAIN DESCRIPTION - LOCATION
LOCATION: COCCYX

## 2025-02-12 ASSESSMENT — PAIN DESCRIPTION - ORIENTATION
ORIENTATION: MID
ORIENTATION: MID

## 2025-02-12 ASSESSMENT — PAIN DESCRIPTION - FREQUENCY: FREQUENCY: CONTINUOUS

## 2025-02-12 ASSESSMENT — PAIN SCALES - GENERAL
PAINLEVEL_OUTOF10: 0
PAINLEVEL_OUTOF10: 0
PAINLEVEL_OUTOF10: 1
PAINLEVEL_OUTOF10: 2
PAINLEVEL_OUTOF10: 2

## 2025-02-12 ASSESSMENT — PAIN SCALES - WONG BAKER: WONGBAKER_NUMERICALRESPONSE: NO HURT

## 2025-02-12 ASSESSMENT — PAIN DESCRIPTION - PAIN TYPE: TYPE: CHRONIC PAIN

## 2025-02-12 ASSESSMENT — PAIN DESCRIPTION - ONSET: ONSET: ON-GOING

## 2025-02-12 NOTE — PROGRESS NOTES
Rachel Mcdaniel    : 1950  Acct #: [id number]  MRN: [id number]              PM&R Progress Note      Admitting diagnosis: ***    Comorbid diagnoses impacting rehabilitation: ***    Chief complaint: ***    Prior (baseline) level of function: Independent.    Current level of function:         Current  IRF-SVETLANA and Goals:   Occupational Therapy:    Short Term Goals  Time Frame for Short Term Goals: STGs=LTGs :   Long Term Goals  Time Frame for Long Term Goals : 10 days or until d/c.  Long Term Goal 1: Pt will complete oral hygiene and grooming tasks with IND.  Long Term Goal 2: Pt will complete total body bathing with AE PRN and Mod I.  Long Term Goal 3: Pt will complete UB dressing with IND.  Long Term Goal 4: Pt will complete LB dressing with AE PRN and Mod I.  Long Term Goal 5: Pt will doff/don footwear with AE PRN and Mod I.  Additional Goals?: Yes  Long Term Goal 6: Pt will complete toileting with Mod I.  Long Term Goal 7: Pt will complete functional transfers (bed, chair, shower, toilet) with DME PRN and Mod I.  Long Term Goal 8: Pt will perform therex/therax to facilitate an increase in strength/endurance with emphasis on dynamic standing balance/tolerance > 8 mins with Mod I.  Long Term Goal 9: Pt will perform an IADL with Mod I. :                                       Eating: Eating  Assistance Needed: Independent  Comment: able to open packages/containers, feed self  CARE Score: 6  Discharge Goal: Independent       Oral Hygiene: Oral Hygiene  Assistance Needed: Independent  Comment: seated at sink  CARE Score: 6  Discharge Goal: Independent    UB/LB Bathing: Shower/Bathe Self  Assistance Needed: Independent  Comment: Pt completed full shower seated for majority; Mod I in stance to wash bottom; able to wash distal BLEs in figure four positioning  CARE Score: 6  Discharge Goal: Independent    UB Dressing: Upper Body Dressing  Assistance Needed: Independent  Comment: Pt able to retrieve and transport  Self  Assistance Needed: Independent  Comment: Pt completed full shower seated for majority; Mod I in stance to wash bottom; able to wash distal BLEs in figure four positioning  CARE Score: 6  Discharge Goal: Independent    UB Dressing: Upper Body Dressing  Assistance Needed: Independent  Comment: Pt able to retrieve and transport clothing and doff/don sweater  CARE Score: 6  Discharge Goal: Independent         LB Dressing: Lower Body Dressing  Assistance Needed: Independent  Comment: Pt able to retrieve and transport clothing; able to thread BLEs into depends and pants in figure four positioning and manage up over hips IND  CARE Score: 6  Discharge Goal: Independent    Donning and Cora Footwear: Putting On/Taking Off Footwear  Assistance Needed: Independent  Comment: to doff/don hospital socks in figure four positioning.  CARE Score: 6  Discharge Goal: Independent      Toileting: Toileting Hygiene  Assistance needed: Independent  Comment: Mod I for clothing management and bladder hygiene  CARE Score: 6      Toilet Transfers:  Toilet Transfer  Assistance needed: Independent  Comment: Mod I c RW, good compliance with sternal precautions  CARE Score: 6  Discharge Goal: Independent (per OT eval)    Physical Therapy:   Short Term Goals  Time Frame for Short Term Goals: 10 days STG=LTG  Short Term Goal 1: Pt will perform bed mobility with mod I  Short Term Goal 2: Pt will perform all functional trasnfers with mod I  Short Term Goal 3: Pt will ambulate with 2ww on level surfaces 150' and unlevel surfaces 10' with mod I  Short Term Goal 4: Pt will perform curb step with 2ww and 4 steps with B rail mod I, 12 steps with supervision  Short Term Goal 5: Pt will  light object with 2ww and reacher with mod I            Bed Mobility:   Sit to Lying  Assistance Needed: Partial/moderate assistance  Comment: sba with sternl precautions  CARE Score: 3  Discharge Goal: Independent  Roll Left and Right  Assistance Needed:

## 2025-02-12 NOTE — CARE COORDINATION
Discussed discharge with patient. Notified her that 2WW was ordered. Discussed HHC no choice has been made at this time. She plans to discuss with her neighbor. She is requesting Meds to Bed.  Provided a copy of the Important Message from Medicare form signed upon admission. Patient verbalized understanding.

## 2025-02-12 NOTE — PLAN OF CARE
Problem: Discharge Planning  Goal: Discharge to home or other facility with appropriate resources  2/11/2025 2354 by Elisha Bowles LPN  Outcome: Progressing  2/11/2025 1258 by Stephenie Streeter RN  Outcome: Progressing     Problem: Chronic Conditions and Co-morbidities  Goal: Patient's chronic conditions and co-morbidity symptoms are monitored and maintained or improved  2/11/2025 2354 by Elisha Bowles LPN  Outcome: Progressing  2/11/2025 1258 by Stephenie Streeter RN  Outcome: Progressing     Problem: Safety - Adult  Goal: Free from fall injury  2/11/2025 2354 by Elisha Bowles LPN  Outcome: Progressing  2/11/2025 1258 by Stephenie Streeter RN  Outcome: Progressing     Problem: ABCDS Injury Assessment  Goal: Absence of physical injury  2/11/2025 2354 by Elisha Bowles LPN  Outcome: Progressing  2/11/2025 1258 by Stephenie Streeter RN  Outcome: Progressing     Problem: Pain  Goal: Verbalizes/displays adequate comfort level or baseline comfort level  2/11/2025 2354 by Elisha Bowles LPN  Outcome: Progressing  2/11/2025 1258 by Stephenie Streeter RN  Outcome: Progressing     Problem: Nutrition Deficit:  Goal: Optimize nutritional status  2/11/2025 2354 by Elisha Bowles LPN  Outcome: Progressing  2/11/2025 1258 by Stephenie Streeter RN  Outcome: Progressing

## 2025-02-12 NOTE — PROGRESS NOTES
Physical Therapy      [x] daily progress note       [] discharge       Patient Name:  Rachel Mcdaniel   :  1950 MRN: [id number]  Room:  [address] [place], [place] [address] Date of Admission: 2025  Rehabilitation Diagnosis:   Atherosclerotic heart disease of native coronary artery without angina pectoris [I25.10]  CHF following cardiac surgery, postop [I97.130]       Date 2025       Day of ARU Week:  1   Time IN/OUT 5648-5706   Individual Tx Minutes 60   TOTAL Tx Time Mins 60   Variance Time    Variance Time []   Refusal due to:     []   Medical hold/reason:    []   Illness   []   Off Unit for test/procedure  []   Extra time needed to complete task  []   Therapeutic need  []   Other (specify):   Restrictions Restrictions/Precautions  Restrictions/Precautions: Fall Risk, General Precautions, Surgical Protocols (sternal precautions)  Position Activity Restriction  Sternal Precautions: No Pushing, No Pulling, 5# Lifting Restrictions   Communication with other providers: [x]   OK to see per nursing:     []   Spoke with team member regarding:      Subjective observations and cognitive status: Pt up in recliner, agreeable to flight of stairs, states \"my son wants to make sure I can do them\".  VS at rest: /54, HR 68, O2 sats 100%     Pain level/location: 0 /10    Location:    Discharge recommendations  Anticipated discharge date:    Destination: []home alone   []home alone w assist prn   [x] home w/ family    [] Continuous supervision       []SNF    [] Assisted living     [] Other:   Continued therapy: [x]HHC OT  []OUTPATIENT  OT   [] No Further OT  Equipment needs: None      Bed Mobility:           [x]   Pt received out of bed       Transfers:    Sit--> Stand:  Indep, demos precautions  Stand --> Sit:   Indep, demos precautions  Stand-Pivot:   Indep  Car Transfers:    Assistive device required for transfer:   RW    Gait:    Distance:  116'+ 264'+136'  Assistance:  Indep  Device:  RW  Gait Quality:   steady recip pattern,  training/Advanced mobility safety and technique  [x]   Reinforced patient's precautions/mobility while maintaining precautions  []   Postural awareness  []   Family/caregiver training  [x]   Progress was updated and reviewed with patient  this date.  []   Other:       Treatment Plan for Next Session: QI next session        Treatment/Activity Tolerance:   [x] Tolerated treatment with no adverse effects    [] Patient limited by fatigue  [] Patient limited by pain   [] Patient limited by medical complications:    [] Adverse reaction to Tx:   [] Significant change in status    Safety:       []  bed alarm set    [x]  chair alarm set    []  Pt refused alarms                []  Telesitter activated      [x]  Gait belt used during tx session      [] Call light and belongings in reach       [] Other      Number of Minutes/Billable Intervention  Gait Training 50   Therapeutic Exercise 10   Neuro Re-Ed    Therapeutic Activity    Wheelchair Propulsion    Group    Other:    TOTAL 60         Social History  Social/Functional History  Lives With: Son, Family (DIL, grandkids)  Type of Home: House  Home Layout: Two level (bedroom in basement with 13 steps to access rail on one side, has a landing.shower and toilet in basement. laundry on second floor.)  Home Access: Stairs to enter without rails  Entrance Stairs - Number of Steps: 3  Bathroom Shower/Tub: Walk-in shower  Bathroom Toilet: Standard  Bathroom Equipment: None  Bathroom Accessibility: Walker accessible  Home Equipment: None  Has the patient had two or more falls in the past year or any fall with injury in the past year?: No  Receives Help From: Family  Prior Level of Assist for ADLs: Independent  Prior Level of Assist for Homemaking: Independent  Homemaking Responsibilities: Yes  Meal Prep Responsibility: Primary  Laundry Responsibility: Primary  Cleaning Responsibility: Secondary  Bill Paying/Finance Responsibility: Primary  Shopping Responsibility: Primary  Dependent

## 2025-02-12 NOTE — PLAN OF CARE
Problem: Safety - Adult  Goal: Free from fall injury  2/12/2025 1221 by Stephenie Streeter RN  Outcome: Progressing  2/11/2025 2354 by Elisha Bowles LPN  Outcome: Progressing     Problem: ABCDS Injury Assessment  Goal: Absence of physical injury  2/12/2025 1221 by Stephenie Streeter RN  Outcome: Progressing  2/11/2025 2354 by Elisha Bowles LPN  Outcome: Progressing     Problem: Pain  Goal: Verbalizes/displays adequate comfort level or baseline comfort level  2/12/2025 1221 by Stephenie Streeter RN  Outcome: Progressing  2/11/2025 2354 by Elisha Bowles LPN  Outcome: Progressing     Problem: Nutrition Deficit:  Goal: Optimize nutritional status  2/12/2025 1221 by Stephenie Streeter RN  Outcome: Progressing  2/11/2025 2354 by Elisha Bowles LPN  Outcome: Progressing

## 2025-02-12 NOTE — PROGRESS NOTES
Occupational Therapy    Physical Rehabilitation: OCCUPATIONAL THERAPY     [x] daily progress note       [] discharge       Patient Name:  Rachel Mcdaniel   :  1950 MRN: [id number]  Room:  [address] [place], [place] [address] Date of Admission: 2025  Rehabilitation Diagnosis:   Atherosclerotic heart disease of native coronary artery without angina pectoris [I25.10]  CHF following cardiac surgery, postop [I97.130]       Date 2025       Day of ARU Week:  1   Time IN/OUT 1805-4281  8780-2100   Individual Tx Minutes 60+30   Group Tx Minutes    Co-Treat Minutes    Concurrent Tx Minutes    TOTAL Tx Time Mins 90   Variance Time    Variance Reason []   Refusal due to:     []   Medical hold/reason:    []   Illness   []   Off Unit for test/procedure  []   Extra time needed to complete task  []   Therapeutic need  []   Make up mins were attempted but pt unable to complete due to (specify)  []   Other (specify):   Restrictions Restrictions/Precautions: Fall Risk, General Precautions, Surgical Protocols (sternal precautions)         Communication with other providers: [x]   OK to see per nursing:     []   Spoke with team member regarding:      Subjective observations and cognitive status: Pt resting in bed on approach; pleasant and agreeable to therapy.     Pt finishing with PT on approach; pleasant and agreeable to therapy.      Pain level/location:    /10       Location: did not rate \"a little in the tail bone\"     0/10     Location: none    Discharge recommendations  Anticipated discharge date:    Destination: []home alone   []home alone w assist prn   [x] home w/ family    [] Continuous supervision       []SNF    [] Assisted living     [] Other:   Continued therapy: [x]HHC OT  []OUTPATIENT  OT   [] No Further OT  Equipment needs: none        ADLs:    Eating: Eating  Assistance Needed: Independent  Comment: X  CARE Score: 6  Discharge Goal: Independent       Oral Hygiene: Oral Hygiene  Assistance Needed: Independent  Comment:  []   Cardiac Walker       []   Other:          Additional Therapeutic activities/exercises completed this date:     [x]   ADL Training   [x]   Balance/Postural training     [x]   Bed/Transfer Training   [x]   Endurance Training: To increase BUE endurance for ADLs and transfers pt completed arm ergometer on min resistance 8 mins, average 36 RPM.      []   Neuromuscular Re-ed   []   Nu-step:  Time:        Level:         #Steps:       []   Rebounder:    []  Seated     []  Standing        []   Supine Ther Ex (reps/sets):     [x]   Seated Ther Ex (reps/sets): Pt was instructed in Intermediate Cardiac ex program:Started with PT, finished exercises during OT session   Forward Arm Raises x 10 in standing   Side Arm Raises x 10 in standing   Arm Crosses x 10 in standing   Arm Circles Forwards and Backwards x 10 in standing   SittingTrunkTwist x 10 seated         []   Standing Ther Ex (reps/sets):     []   Other:      Comments:  All intervention performed to increase pt's strength, endurance, ax tolerance, and balance in prep for increased I c ADL/IADLs and functional transfers/mobility.       Patient/Caregiver Education and Training:   []   Adaptive Equipment Use  []   Bed Mobility/Transfer Technique/Safety  []   Energy Conservation Tips  []   Family training  []   Postural Awareness  []   Safety During Functional Activities  []   Reinforced Patient's Precautions       Treatment Plan for Next Session: Continue OT POC           Treatment/Activity Tolerance:   [x] Tolerated treatment with no adverse effects    [] Patient limited by fatigue  [] Patient limited by pain   [] Patient limited by medical complications:    [] Adverse reaction to Tx:   [] Significant change in status    Safety:       [x]  bed alarm set    []  chair alarm set    []  Pt refused alarms                []  Telesitter activated      [x]  Gait belt used during tx session      []other:       Number of Minutes/Billable Intervention  Therapeutic Exercise 25

## 2025-02-12 NOTE — PROGRESS NOTES
Physical Rehabilitation: OCCUPATIONAL THERAPY     [x] daily progress note       [] discharge       Patient Name:  Rachel Mcdaniel   :  1950 MRN: [id number]  Room:  [address] [place], [place] [address] Date of Admission: 2025  Rehabilitation Diagnosis:   Atherosclerotic heart disease of native coronary artery without angina pectoris [I25.10]  CHF following cardiac surgery, postop [I97.130]       Date 2025       Day of ARU Week:  1   Time IN/OUT 1030/1100   Individual Tx Minutes 30   Group Tx Minutes    Co-Treat Minutes    Concurrent Tx Minutes    TOTAL Tx Time Mins 30   Variance Time    Variance Reason []   Refusal due to:     []   Medical hold/reason:    []   Illness   []   Off Unit for test/procedure  []   Extra time needed to complete task  []   Therapeutic need  []   Make up mins were attempted but pt unable to complete due to (specify)  []   Other (specify):   Restrictions Restrictions/Precautions: Fall Risk, General Precautions, Surgical Protocols (sternal precautions)         Communication with other providers: [x]   OK to see per nursing:     []   Spoke with team member regarding:      Subjective observations and cognitive status: Patient handed off from temple, pleasant and agreeable to therapy      Pain level/location:    /10       Location: per patient no pain noted    Discharge recommendations  Anticipated discharge date:    Destination: []home alone   []home alone w assist prn   [x] home w/ family    [] Continuous supervision       []SNF    [] Assisted living     [] Other:   Continued therapy: [x]HHC OT  []OUTPATIENT  OT   [] No Further OT  Equipment needs: None        ADLs:    Bed Mobility:           [x]   Pt received out of bed   Rolling R/L:    Scooting:    Supine --> Sit:    Sit --> Supine:      Transfers:    Sit--> Stand:  Mod I   Stand --> Sit:   Mod I   Stand-Pivot:   Mod I   Other:    Assistive device required for transfer:   RW      Functional Mobility:  throughout ARU prior to and following session  Pt will doff/don footwear with AE PRN and Mod I.  Additional Goals?: Yes  Long Term Goal 6: Pt will complete toileting with Mod I.  Long Term Goal 7: Pt will complete functional transfers (bed, chair, shower, toilet) with DME PRN and Mod I.  Long Term Goal 8: Pt will perform therex/therax to facilitate an increase in strength/endurance with emphasis on dynamic standing balance/tolerance > 8 mins with Mod I.  Long Term Goal 9: Pt will perform an IADL with Mod I.:        Plan of Care                                                                              Times per week: 5 days per week for a minimum of 60 minutes/day plus group as appropriate for 60 minutes.  Treatment to include Occupational Therapy Plan  Current Treatment Recommendations: Balance training, Functional mobility training, Strengthening, Endurance training, Pain management, Safety education & training, Patient/Caregiver education & training, Equipment evaluation, education, & procurement, Positioning, Self-Care / ADL, Home management training, Cognitive/Perceptual training, Group Therapy    Electronically signed by   GAYLE Sanchez,  2/12/2025, 10:47 AM

## 2025-02-13 PROCEDURE — 97535 SELF CARE MNGMENT TRAINING: CPT

## 2025-02-13 PROCEDURE — 6370000000 HC RX 637 (ALT 250 FOR IP): Performed by: PHYSICAL MEDICINE & REHABILITATION

## 2025-02-13 PROCEDURE — 97530 THERAPEUTIC ACTIVITIES: CPT

## 2025-02-13 PROCEDURE — 6370000000 HC RX 637 (ALT 250 FOR IP): Performed by: PHYSICIAN ASSISTANT

## 2025-02-13 PROCEDURE — 97116 GAIT TRAINING THERAPY: CPT

## 2025-02-13 PROCEDURE — 99232 SBSQ HOSP IP/OBS MODERATE 35: CPT | Performed by: PHYSICAL MEDICINE & REHABILITATION

## 2025-02-13 PROCEDURE — 1280000000 HC REHAB R&B

## 2025-02-13 PROCEDURE — 97110 THERAPEUTIC EXERCISES: CPT

## 2025-02-13 PROCEDURE — 94761 N-INVAS EAR/PLS OXIMETRY MLT: CPT

## 2025-02-13 RX ORDER — AMIODARONE HYDROCHLORIDE 200 MG/1
200 TABLET ORAL DAILY
Qty: 30 TABLET | Refills: 0 | Status: SHIPPED | OUTPATIENT
Start: 2025-02-14 | End: 2025-02-13

## 2025-02-13 RX ORDER — DIPHENHYDRAMINE HYDROCHLORIDE, ZINC ACETATE 2; .1 G/100G; G/100G
CREAM TOPICAL 3 TIMES DAILY PRN
Status: DISCONTINUED | OUTPATIENT
Start: 2025-02-13 | End: 2025-02-14 | Stop reason: HOSPADM

## 2025-02-13 RX ORDER — FUROSEMIDE 40 MG/1
40 TABLET ORAL DAILY
Qty: 30 TABLET | Refills: 0 | Status: SHIPPED | OUTPATIENT
Start: 2025-02-14 | End: 2025-02-20

## 2025-02-13 RX ORDER — AMLODIPINE BESYLATE 2.5 MG/1
2.5 TABLET ORAL DAILY
Qty: 30 TABLET | Refills: 0 | Status: SHIPPED | OUTPATIENT
Start: 2025-02-14 | End: 2025-02-13

## 2025-02-13 RX ORDER — METOPROLOL TARTRATE 75 MG/1
75 TABLET ORAL 2 TIMES DAILY
Qty: 60 TABLET | Refills: 0 | Status: SHIPPED | OUTPATIENT
Start: 2025-02-13

## 2025-02-13 RX ORDER — ATORVASTATIN CALCIUM 40 MG/1
40 TABLET, FILM COATED ORAL NIGHTLY
Qty: 30 TABLET | Refills: 0 | Status: SHIPPED | OUTPATIENT
Start: 2025-02-13

## 2025-02-13 RX ORDER — AMLODIPINE BESYLATE 2.5 MG/1
2.5 TABLET ORAL DAILY
Qty: 30 TABLET | Refills: 0 | Status: SHIPPED | OUTPATIENT
Start: 2025-02-14

## 2025-02-13 RX ORDER — METOPROLOL TARTRATE 75 MG/1
75 TABLET ORAL 2 TIMES DAILY
Qty: 60 TABLET | Refills: 0 | Status: SHIPPED | OUTPATIENT
Start: 2025-02-13 | End: 2025-02-13

## 2025-02-13 RX ORDER — AMIODARONE HYDROCHLORIDE 200 MG/1
200 TABLET ORAL DAILY
Qty: 30 TABLET | Refills: 0 | Status: SHIPPED | OUTPATIENT
Start: 2025-02-14

## 2025-02-13 RX ORDER — FUROSEMIDE 40 MG/1
40 TABLET ORAL DAILY
Qty: 30 TABLET | Refills: 0 | Status: SHIPPED | OUTPATIENT
Start: 2025-02-14 | End: 2025-02-13

## 2025-02-13 RX ORDER — ATORVASTATIN CALCIUM 40 MG/1
40 TABLET, FILM COATED ORAL NIGHTLY
Qty: 30 TABLET | Refills: 0 | Status: SHIPPED | OUTPATIENT
Start: 2025-02-13 | End: 2025-02-13

## 2025-02-13 RX ADMIN — ALPRAZOLAM 0.25 MG: 0.5 TABLET ORAL at 22:52

## 2025-02-13 RX ADMIN — AMIODARONE HYDROCHLORIDE 200 MG: 200 TABLET ORAL at 08:58

## 2025-02-13 RX ADMIN — METOPROLOL TARTRATE 75 MG: 50 TABLET, FILM COATED ORAL at 08:58

## 2025-02-13 RX ADMIN — APIXABAN 5 MG: 5 TABLET, FILM COATED ORAL at 08:58

## 2025-02-13 RX ADMIN — LEVOTHYROXINE SODIUM 125 MCG: 0.12 TABLET ORAL at 06:47

## 2025-02-13 RX ADMIN — LATANOPROST 1 DROP: 50 SOLUTION OPHTHALMIC at 21:35

## 2025-02-13 RX ADMIN — ACETAMINOPHEN 650 MG: 325 TABLET ORAL at 21:36

## 2025-02-13 RX ADMIN — DIPHENHYDRAMINE HYDROCHLORIDE, ZINC ACETATE: 2; .1 CREAM TOPICAL at 21:35

## 2025-02-13 RX ADMIN — ATORVASTATIN CALCIUM 40 MG: 40 TABLET, FILM COATED ORAL at 21:36

## 2025-02-13 RX ADMIN — ACETAMINOPHEN 650 MG: 325 TABLET ORAL at 11:01

## 2025-02-13 RX ADMIN — AMLODIPINE BESYLATE 2.5 MG: 5 TABLET ORAL at 08:58

## 2025-02-13 RX ADMIN — ACETAMINOPHEN 650 MG: 325 TABLET ORAL at 17:23

## 2025-02-13 RX ADMIN — BACITRACIN: 500 OINTMENT TOPICAL at 21:37

## 2025-02-13 RX ADMIN — ASPIRIN 81 MG CHEWABLE TABLET 81 MG: 81 TABLET CHEWABLE at 08:58

## 2025-02-13 RX ADMIN — METOPROLOL TARTRATE 75 MG: 50 TABLET, FILM COATED ORAL at 21:35

## 2025-02-13 RX ADMIN — APIXABAN 5 MG: 5 TABLET, FILM COATED ORAL at 21:35

## 2025-02-13 RX ADMIN — ACETAMINOPHEN 650 MG: 325 TABLET ORAL at 06:47

## 2025-02-13 RX ADMIN — POTASSIUM BICARBONATE 20 MEQ: 782 TABLET, EFFERVESCENT ORAL at 09:03

## 2025-02-13 RX ADMIN — FUROSEMIDE 40 MG: 40 TABLET ORAL at 08:58

## 2025-02-13 ASSESSMENT — PAIN DESCRIPTION - ONSET: ONSET: ON-GOING

## 2025-02-13 ASSESSMENT — PAIN DESCRIPTION - DESCRIPTORS
DESCRIPTORS: ACHING;DISCOMFORT
DESCRIPTORS: ACHING;DISCOMFORT
DESCRIPTORS: DISCOMFORT

## 2025-02-13 ASSESSMENT — PAIN DESCRIPTION - LOCATION
LOCATION: BACK
LOCATION: COCCYX
LOCATION: COCCYX

## 2025-02-13 ASSESSMENT — PAIN DESCRIPTION - PAIN TYPE: TYPE: CHRONIC PAIN

## 2025-02-13 ASSESSMENT — PAIN SCALES - GENERAL
PAINLEVEL_OUTOF10: 4
PAINLEVEL_OUTOF10: 7
PAINLEVEL_OUTOF10: 3

## 2025-02-13 ASSESSMENT — PAIN DESCRIPTION - FREQUENCY: FREQUENCY: INTERMITTENT

## 2025-02-13 ASSESSMENT — PAIN - FUNCTIONAL ASSESSMENT: PAIN_FUNCTIONAL_ASSESSMENT: ACTIVITIES ARE NOT PREVENTED

## 2025-02-13 NOTE — CARE COORDINATION
CELESTINA spoke with patient to discuss discharge and obtain HHC choice. Patient chose [facility] and referral was made.     CELESTINA left a voicemail for Elisha with the office of Arnoldo Wheeler MD to notify of discharge. Office to call patient directly to schedule.     Patient was provided with a list of follow up appointments. BIMS completed.

## 2025-02-13 NOTE — PROGRESS NOTES
Rachel Mcdaniel    : 1950  [id number]  MRN: [id number]              PM&R Progress Note      Admitting diagnosis: ***    Comorbid diagnoses impacting rehabilitation: ***    Chief complaint: ***    Prior (baseline) level of function: Independent.    Current level of function:         Current  IRF-SVETLANA and Goals:   Occupational Therapy:    Short Term Goals  Time Frame for Short Term Goals: STGs=LTGs :   Long Term Goals  Time Frame for Long Term Goals : 10 days or until d/c.  Long Term Goal 1: Pt will complete oral hygiene and grooming tasks with IND.  Long Term Goal 2: Pt will complete total body bathing with AE PRN and Mod I.  Long Term Goal 3: Pt will complete UB dressing with IND.  Long Term Goal 4: Pt will complete LB dressing with AE PRN and Mod I.  Long Term Goal 5: Pt will doff/don footwear with AE PRN and Mod I.  Additional Goals?: Yes  Long Term Goal 6: Pt will complete toileting with Mod I.  Long Term Goal 7: Pt will complete functional transfers (bed, chair, shower, toilet) with DME PRN and Mod I.  Long Term Goal 8: Pt will perform therex/therax to facilitate an increase in strength/endurance with emphasis on dynamic standing balance/tolerance > 8 mins with Mod I.  Long Term Goal 9: Pt will perform an IADL with Mod I. :                                       Eating: Eating  Assistance Needed: Independent  Comment: X  CARE Score: 6  Discharge Goal: Independent       Oral Hygiene: Oral Hygiene  Assistance Needed: Independent  Comment: seated at sink  CARE Score: 6  Discharge Goal: Independent    UB/LB Bathing: Shower/Bathe Self  Assistance Needed: Independent  Comment: Pt completed full shower seated for majority; Mod I in stance to wash perineal area; able to wash distal BLEs in figure four positioning  CARE Score: 6  Discharge Goal: Independent    UB Dressing: Upper Body Dressing  Assistance Needed: Independent  Comment: Pt able to retrieve and transport clothing safety and doff/don

## 2025-02-13 NOTE — DISCHARGE INSTRUCTIONS
Your information:  Name: Rachel Mcdaniel  : 1950    Your instructions:    Pt is discharging to home with [facility]  [address], [address], [place] [id number]  [phone]  A representative from [facility] will contact you at home to schedule your home care needs.    Pt will discharge home with medical supplies from   [facility]  [address], [place] [id number]  [phone]    What to do after you leave the hospital:    Recommended diet: regular diet    Recommended activity: activity as tolerated    The following personal items were collected during your admission and were returned to you:    Belongings  Dental Appliances: Lowers, Partials, At bedside  Vision - Corrective Lenses: Eyeglasses, At bedside  Hearing Aid: None  Clothing: Bathrobe, Footwear, Jacket/Coat, Pajamas, Pants, Robe, Shirt, Slippers, Socks, Undergarments, At bedside  Jewelry: None  Body Piercings Removed: N/A  Electronic Devices: Cell Phone, , At bedside  Weapons (Notify Protective Services/Security): None  Other Valuables: Money, Wallet, Purse, Keys, Suitcase, At bedside  Home Medications: None  Valuables Given To: Patient  Provide Name(s) of Who Valuable(s) Were Given To: NA    Information obtained by:  By signing below, I understand that if any problems occur once I leave the hospital I am to contact 911 or your primary care physician.  I understand and acknowledge receipt of the instructions indicated above.

## 2025-02-13 NOTE — DISCHARGE INSTR - ACTIVITY
Pt is discharging to home with [facility]  [address], [address], [place] [address]  [phone]  A representative from [facility] will contact you at home to schedule your home care needs.    Pt will discharge home with medical supplies from   [facility]  [address], [place] [address]  [phone]

## 2025-02-13 NOTE — PROGRESS NOTES
Comprehensive Nutrition Assessment    Type and Reason for Visit:  Reassess    Nutrition Recommendations/Plan:   Continue regular diet at this time   Will continue to offer oral nutrition supplement during stay      Malnutrition Assessment:  Malnutrition Status:  At risk for malnutrition (02/06/25 1128)    Context:  Chronic Illness       Nutrition Assessment:    Able to advance to regular diet, continues to receive oral nutrition supplement. Meal intake %. Improving intake during stay. Will continue to follow at moderate nutrition risk with increased needs.    Nutrition Related Findings:    not available on visit, plan for discharge tomorrow Wound Type: Multiple, Surgical Incision       Current Nutrition Intake & Therapies:    Average Meal Intake: 51-75%, %  Average Supplements Intake: %  ADULT ORAL NUTRITION SUPPLEMENT; Breakfast, Dinner; Standard High Calorie/High Protein Oral Supplement  ADULT DIET; Regular; prefers vanilla    Anthropometric Measures:  Height: 160 cm (5' 2.99\")  Ideal Body Weight (IBW): 115 lbs (52 kg)    Admission Body Weight: 61.5 kg (135 lb 9.3 oz)  Current Body Weight: 61.9 kg (136 lb 7.4 oz),   IBW. Weight Source: Bed scale  Current BMI (kg/m2): 24.2  Usual Body Weight: 65.2 kg (143 lb 11.8 oz) (cardiology visit 1/8)     % Weight Change (Calculated): -5.7  Weight Adjustment For: No Adjustment                 BMI Categories: Normal Weight (BMI 22.0 to 24.9) age over 65    Estimated Daily Nutrient Needs:  Energy Requirements Based On: Kcal/kg  Weight Used for Energy Requirements: Current  Energy (kcal/day): 9649-4767 (28-32kcal/kg)  Weight Used for Protein Requirements: Current  Protein (g/day): 67-80 (1.1-1.3g/kg)  Method Used for Fluid Requirements: 1 ml/kcal  Fluid (ml/day): 1800    Nutrition Diagnosis:   Predicted inadequate energy intake related to acute injury/trauma, decreased appetite, lack or limited access to food as evidenced by intake 51-75%, weight

## 2025-02-13 NOTE — PROGRESS NOTES
Occupational Therapy    Physical Rehabilitation: OCCUPATIONAL THERAPY     [] daily progress note       [x] discharge       Patient Name:  Rachel Mcdaniel   :  1950 MRN: [id number]  Room:  [address] [place] [address] Date of Admission: 2025  Rehabilitation Diagnosis:   Atherosclerotic heart disease of native coronary artery without angina pectoris [I25.10]  CHF following cardiac surgery, postop [I97.130]       Date 2025       Day of ARU Week:  2   Time IN/OUT 2596-6157  [id number]   Individual Tx Minutes 60+60   Group Tx Minutes    Co-Treat Minutes    Concurrent Tx Minutes    TOTAL Tx Time Mins 120   Variance Time    Variance Reason []   Refusal due to:     []   Medical hold/reason:    []   Illness   []   Off Unit for test/procedure  []   Extra time needed to complete task  []   Therapeutic need  []   Make up mins were attempted but pt unable to complete due to (specify)  []   Other (specify):   Restrictions Restrictions/Precautions: Fall Risk, General Precautions, Surgical Protocols (sternal precautions)         Communication with other providers: [x]   OK to see per nursing:     []   Spoke with team member regarding:      Subjective observations and cognitive status: Pt sitting on the EOB eating breakfast on approach; pleasant and agreeable to therapy.     Pt sitting up in recliner on approach; pleasant and agreeable to therapy.      Pain level/location:    /10       Location: none    Discharge recommendations  Anticipated discharge date:    Destination: []home alone   []home alone w assist prn   [x] home w/ family    [] Continuous supervision       []SNF    [] Assisted living     [] Other:   Continued therapy: [x]HHC OT  []OUTPATIENT  OT   [] No Further OT  Equipment needs: none        ADLs:    Eating: Eating  Assistance Needed: Independent  Comment: Pt able to open all containers/packages, feed self  CARE Score: 6  Discharge Goal: Independent       Oral Hygiene: Oral Hygiene  Assistance Needed:  total body bathing with AE PRN and Mod I.  Long Term Goal 3: Pt will complete UB dressing with IND.  Long Term Goal 4: Pt will complete LB dressing with AE PRN and Mod I.  Long Term Goal 5: Pt will doff/don footwear with AE PRN and Mod I.  Additional Goals?: Yes  Long Term Goal 6: Pt will complete toileting with Mod I.  Long Term Goal 7: Pt will complete functional transfers (bed, chair, shower, toilet) with DME PRN and Mod I.  Long Term Goal 8: Pt will perform therex/therax to facilitate an increase in strength/endurance with emphasis on dynamic standing balance/tolerance > 8 mins with Mod I.  Long Term Goal 9: Pt will perform an IADL with Mod I.:        Plan of Care                                                                              Times per week: 5 days per week for a minimum of 60 minutes/day plus group as appropriate for 60 minutes.  Treatment to include Occupational Therapy Plan  Current Treatment Recommendations: Balance training, Functional mobility training, Strengthening, Endurance training, Pain management, Safety education & training, Patient/Caregiver education & training, Equipment evaluation, education, & procurement, Positioning, Self-Care / ADL, Home management training, Cognitive/Perceptual training, Group Therapy    Electronically signed by   GAYLE Vogel,  2/13/2025, 3:25 PM

## 2025-02-13 NOTE — PROGRESS NOTES
(Curb)  Assistance Needed: Independent  Comment: with RW  CARE Score: 6  Discharge Goal: Independent     4 Steps  Assistance Needed: Independent  Comment: 1-2 rails, recip pattern  CARE Score: 6  Discharge Goal: Independent     12 Steps  Assistance Needed: Independent  Comment: 1-2 rails, recip pattern  CARE Score: 6  Discharge Goal: Supervision or touching assistance      Wheelchair:  W/C Ability: Wheelchair Ability  Uses a Wheelchair and/or Scooter?: No         Patient/Caregiver Education and Training:   [x]   Bed Mobility/Transfer technique/safety  [x]   Gait technique/sequencing  [x]   Proper use of assistive device  [x]   Advanced mobility safety and technique  []   Reinforced patient's precautions/mobility while maintaining precautions  []   Postural awareness  []   Family training      Treatment Plan for Next Session: Pr to be discharged to home with support of family; [facility] PT and continued use of RW recommended.       Treatment/Activity Tolerance:   [x] Tolerated treatment with no adverse effects    [] Patient limited by fatigue  [] Patient limited by pain   [] Patient limited by medical complications:    [] Adverse reaction to Tx:   [] Significant change in status    Safety:       []  bed alarm set    []  chair alarm set    []  Pt refused alarms                []  Telesitter activated      [x]  Gait belt used during tx session      [x]other:   Pt cleared by Dr Dodd to be Indep in room with RW; Information written on pt whiteboard and RN informed.       Number of Minutes/Billable Intervention  Gait Training    Therapeutic Exercise    Neuro Re-Ed    Therapeutic Activity    Wheelchair Propulsion    Group    Other:    TOTAL 60     Discharge Assessment:    At the time of discharge,  (check all that apply)    [x]   Patient fully participated in the program and made good progress towards established goals.  []   Patient's medical status limited participation and/or progress towards established goals.  [x]    Stairs to enter without rails  Entrance Stairs - Number of Steps: 3  Bathroom Shower/Tub: Walk-in shower  Bathroom Toilet: Standard  Bathroom Equipment: None  Bathroom Accessibility: Walker accessible  Home Equipment: None  Has the patient had two or more falls in the past year or any fall with injury in the past year?: No  Receives Help From: Family  Prior Level of Assist for ADLs: Independent  Prior Level of Assist for Homemaking: Independent  Homemaking Responsibilities: Yes  Meal Prep Responsibility: Primary  Laundry Responsibility: Primary  Cleaning Responsibility: Secondary  Bill Paying/Finance Responsibility: Primary  Shopping Responsibility: Primary  Dependent Care Responsibility: Secondary  Health Care Management: Primary  Prior Level of Assist for Ambulation: Independent community ambulator, with or without device (no device)  Prior Level of Assist for Transfers: Independent  Active : Yes  Mode of Transportation: [facility]  Education:   Type of Occupation: Worked at [employer].  Leisure & Hobbies: Cooking, shopping, laundry, 2 dogs  Pickles and [place], Oriental orth[employer], lunch w friends.  Pt sets up meds via pill box.  Pt writes checks and utilizes auto pay.  Additional Comments: sleeps in flat bed, no exits in basement except stairs.    Objective                                                                                    Goals:  (Update in navigator)  Short Term Goals  Time Frame for Short Term Goals: 10 days STG=LTG  Short Term Goal 1: Pt will perform bed mobility with mod I  Short Term Goal 2: Pt will perform all functional trasnfers with mod I  Short Term Goal 3: Pt will ambulate with 2ww on level surfaces 150' and unlevel surfaces 10' with mod I  Short Term Goal 4: Pt will perform curb step with 2ww and 4 steps with B rail mod I, 12 steps with supervision  Short Term Goal 5: Pt will  light object with 2ww and reacher with mod I:   :        Plan of Care                                       friends.  Pt sets up meds via pill box.  Pt writes checks and utilizes auto pay.  Additional Comments: sleeps in flat bed, no exits in basement except stairs.    Objective                                                                                    Goals:  (Update in navigator)  Short Term Goals  Time Frame for Short Term Goals: 10 days STG=LTG  Short Term Goal 1: Pt will perform bed mobility with mod I  Short Term Goal 2: Pt will perform all functional trasnfers with mod I  Short Term Goal 3: Pt will ambulate with 2ww on level surfaces 150' and unlevel surfaces 10' with mod I  Short Term Goal 4: Pt will perform curb step with 2ww and 4 steps with B rail mod I, 12 steps with supervision  Short Term Goal 5: Pt will  light object with 2ww and reacher with mod I:   :        Plan of Care                                                                              Times per week: 5 days per week for a minimum of 60 minutes/day plus group as appropriate for 60 minutes.  Treatment to include Current Treatment Recommendations: Strengthening, Balance training, Functional mobility training, Transfer training, IADL training, Endurance training, Stair training, Gait training, Patient/Caregiver education & training, Safety education & training, Home exercise program, Group Therapy, Therapeutic activities, Neuromuscular re-education, Equipment evaluation, education, & procurement, Positioning    Electronically signed by   Raine Singh, PTA #[id number]  2/13/2025, 12:27 PM

## 2025-02-14 VITALS
WEIGHT: 133.8 LBS | OXYGEN SATURATION: 95 % | HEIGHT: 63 IN | DIASTOLIC BLOOD PRESSURE: 73 MMHG | TEMPERATURE: 98.1 F | RESPIRATION RATE: 23 BRPM | SYSTOLIC BLOOD PRESSURE: 121 MMHG | HEART RATE: 68 BPM | BODY MASS INDEX: 23.71 KG/M2

## 2025-02-14 LAB
ANION GAP SERPL CALCULATED.3IONS-SCNC: 10 MMOL/L (ref 9–17)
BUN SERPL-MCNC: 14 MG/DL (ref 7–20)
CALCIUM SERPL-MCNC: 9.1 MG/DL (ref 8.3–10.6)
CHLORIDE SERPL-SCNC: 104 MMOL/L (ref 99–110)
CO2 SERPL-SCNC: 24 MMOL/L (ref 21–32)
CREAT SERPL-MCNC: 0.6 MG/DL (ref 0.6–1.2)
ERYTHROCYTE [DISTWIDTH] IN BLOOD BY AUTOMATED COUNT: 15.6 % (ref 11.7–14.9)
GFR, ESTIMATED: >90 ML/MIN/1.73M2
GLUCOSE SERPL-MCNC: 125 MG/DL (ref 74–99)
HCT VFR BLD AUTO: 32.5 % (ref 37–47)
HGB BLD-MCNC: 9.7 G/DL (ref 12.5–16)
MCH RBC QN AUTO: 28.9 PG (ref 27–31)
MCHC RBC AUTO-ENTMCNC: 29.8 G/DL (ref 32–36)
MCV RBC AUTO: 96.7 FL (ref 78–100)
PLATELET # BLD AUTO: 583 K/UL (ref 140–440)
PMV BLD AUTO: 9.2 FL (ref 7.5–11.1)
POTASSIUM SERPL-SCNC: 4.2 MMOL/L (ref 3.5–5.1)
RBC # BLD AUTO: 3.36 M/UL (ref 4.2–5.4)
SODIUM SERPL-SCNC: 138 MMOL/L (ref 136–145)
WBC OTHER # BLD: 7.8 K/UL (ref 4–10.5)

## 2025-02-14 PROCEDURE — 80048 BASIC METABOLIC PNL TOTAL CA: CPT

## 2025-02-14 PROCEDURE — 99239 HOSP IP/OBS DSCHRG MGMT >30: CPT | Performed by: PHYSICAL MEDICINE & REHABILITATION

## 2025-02-14 PROCEDURE — 94761 N-INVAS EAR/PLS OXIMETRY MLT: CPT

## 2025-02-14 PROCEDURE — 6370000000 HC RX 637 (ALT 250 FOR IP): Performed by: PHYSICAL MEDICINE & REHABILITATION

## 2025-02-14 PROCEDURE — 85027 COMPLETE CBC AUTOMATED: CPT

## 2025-02-14 PROCEDURE — 6370000000 HC RX 637 (ALT 250 FOR IP): Performed by: PHYSICIAN ASSISTANT

## 2025-02-14 RX ADMIN — AMLODIPINE BESYLATE 2.5 MG: 5 TABLET ORAL at 10:04

## 2025-02-14 RX ADMIN — ASPIRIN 81 MG CHEWABLE TABLET 81 MG: 81 TABLET CHEWABLE at 10:04

## 2025-02-14 RX ADMIN — POTASSIUM BICARBONATE 20 MEQ: 782 TABLET, EFFERVESCENT ORAL at 10:04

## 2025-02-14 RX ADMIN — FUROSEMIDE 40 MG: 40 TABLET ORAL at 10:04

## 2025-02-14 RX ADMIN — LEVOTHYROXINE SODIUM 125 MCG: 0.12 TABLET ORAL at 05:32

## 2025-02-14 RX ADMIN — AMIODARONE HYDROCHLORIDE 200 MG: 200 TABLET ORAL at 10:04

## 2025-02-14 RX ADMIN — BACITRACIN: 500 OINTMENT TOPICAL at 10:06

## 2025-02-14 RX ADMIN — ACETAMINOPHEN 650 MG: 325 TABLET ORAL at 10:04

## 2025-02-14 RX ADMIN — IBUPROFEN 400 MG: 400 TABLET, FILM COATED ORAL at 12:26

## 2025-02-14 RX ADMIN — APIXABAN 5 MG: 5 TABLET, FILM COATED ORAL at 10:12

## 2025-02-14 RX ADMIN — DIPHENHYDRAMINE HYDROCHLORIDE, ZINC ACETATE: 2; .1 CREAM TOPICAL at 10:05

## 2025-02-14 RX ADMIN — ACETAMINOPHEN 650 MG: 325 TABLET ORAL at 05:32

## 2025-02-14 RX ADMIN — METOPROLOL TARTRATE 75 MG: 50 TABLET, FILM COATED ORAL at 10:12

## 2025-02-14 ASSESSMENT — PAIN SCALES - GENERAL
PAINLEVEL_OUTOF10: 6
PAINLEVEL_OUTOF10: 0
PAINLEVEL_OUTOF10: 0
PAINLEVEL_OUTOF10: 6

## 2025-02-14 ASSESSMENT — PAIN DESCRIPTION - DESCRIPTORS
DESCRIPTORS: ACHING;DISCOMFORT
DESCRIPTORS: ACHING;DISCOMFORT

## 2025-02-14 NOTE — CARE COORDINATION
ARU  Discharge Summary    D/C Date: 02/14/2025    Patient discharged to: Home with family    Transported by: Son    Referrals made to: [facility] (AVS faxed) and [facility]    Additional information: Follow up appointments scheduled with Jeancarlos Solomon MD, [facility] Thursday Feb 20, 2025 8:50 AM, TRANSTHORACIC ECHO LIMITED Thursday Feb 20, 2025 10:30 AM, Tunde Byrd MD Thursday Feb 20, 2025 12:40 PM. CELESTINA left a voicemail for Elisha with the office of Arnoldo Wheeler MD to notify of discharge. Office to call patient directly to schedule. Patient notified, AVS updated.     Caregiver training: none requested    Discharge BIMS completed: [x]      IMM: [x]       Pharmacy: Outpatient Pharmacy Meds to Bed    Discharge Assessment: At the time of discharge   [x] OT Note 02/13 -Patient fully participated in the program and made good progress towards established goals.

## 2025-02-14 NOTE — PROGRESS NOTES
Outpatient Pharmacy Progress Note for Meds-to-Beds    Total number of Prescriptions Filled: 6    Additional Documentation:  Patient's family member picked-up the medication(s) in the OP Pharmacy      Thank you for letting us serve your patients.  [address] [address]    Phone: [phone]    Fax: [phone]

## 2025-02-14 NOTE — PROGRESS NOTES
Rcahel Mcdaniel    : 1950  Acct #: [id number]  MRN: [id number]              PM&R Progress Note      Admitting diagnosis: ***    Comorbid diagnoses impacting rehabilitation: ***    Chief complaint: ***    Prior (baseline) level of function: Independent.    Current level of function:         Current  IRF-SVETLANA and Goals:   Occupational Therapy:    Short Term Goals  Time Frame for Short Term Goals: STGs=LTGs :   Long Term Goals  Time Frame for Long Term Goals : 10 days or until d/c.  Long Term Goal 1: Pt will complete oral hygiene and grooming tasks with IND.  Long Term Goal 2: Pt will complete total body bathing with AE PRN and Mod I.  Long Term Goal 3: Pt will complete UB dressing with IND.  Long Term Goal 4: Pt will complete LB dressing with AE PRN and Mod I.  Long Term Goal 5: Pt will doff/don footwear with AE PRN and Mod I.  Additional Goals?: Yes  Long Term Goal 6: Pt will complete toileting with Mod I.  Long Term Goal 7: Pt will complete functional transfers (bed, chair, shower, toilet) with DME PRN and Mod I.  Long Term Goal 8: Pt will perform therex/therax to facilitate an increase in strength/endurance with emphasis on dynamic standing balance/tolerance > 8 mins with Mod I.  Long Term Goal 9: Pt will perform an IADL with Mod I. :                                       Eating: Eating  Assistance Needed: Independent  Comment: Pt able to open all containers/packages, feed self  CARE Score: 6  Discharge Goal: Independent       Oral Hygiene: Oral Hygiene  Assistance Needed: Independent  Comment: seated at sink  CARE Score: 6  Discharge Goal: Independent    UB/LB Bathing: Shower/Bathe Self  Assistance Needed: Independent  Comment: Pt completed full shower IND  CARE Score: 6  Discharge Goal: Independent    UB Dressing: Upper Body Dressing  Assistance Needed: Independent  Comment: Pt able to retrieve and transport clothing safely and doff/don sweater  CARE Score: 6  Discharge Goal: Independent         LB  therapy services starting after discharge from rehab tomorrow.  I am encouraging ongoing spirometer use, medication compliance and follow-up with her surgeon in a week.  Due to persistent pain, generalized weakness and the newness of sternal precautions, she remains at some risk for fall with injury during standing ADLs and mobility activities.  Continue providing her adaptive equipment training with strengthening exercises, balance recovery training and conditioning development.  Monitoring of her incisions reveal no evidence of infection.  Encouraging compliance with the pulmonary hygiene measures beyond the rehab stay.  She is continent of bowel and bladder.  Case management has arranged caregiver training with her family. Continuing the same dose of Lasix as her diuretic.  Providing afterload reduction with the Lopressor.  Continuing antiplatelet therapy with a baby aspirin as well as her statin (Lipitor).  Outpatient follow-up with her cardiothoracic surgeon and primary care doctor following rehab discharge.  Verbal cues and SBA for transfers.  DVT prophylaxis: Continuing the DOAC (Eliquis) for her atrial arrhythmia as it it protects against new VTE.  However, it does raise her risk for spontaneous hemorrhage and GI bleeding.  Continue monitoring her hemoglobin regularly.  She continues to walk community distances now with a walker and assistance and rest breaks.  No new bruising or swelling.   Uncontrolled pain: Continuing the scheduled acetaminophen 4 times daily.  Using cryotherapy and frequent repositioning to help with pain as well.  Providing a Lidoderm patch to painful areas as needed.  Adjusting bowel intervention while on the analgesics.  Reducing the Motrin because of his potential impact on CHF.  Dysphagia: Speech pathology agrees with advancing her diet to more normal textures and thin liquids.  Continue monitoring her pulmonary parameters for signs of aspiration.    Essential hypertension:

## 2025-02-19 NOTE — PROGRESS NOTES
0    amLODIPine (NORVASC) 2.5 MG tablet, Take 1 tablet by mouth daily (Patient taking differently: Take 2 tablets by mouth daily), Disp: 30 tablet, Rfl: 0    cycloSPORINE (RESTASIS) 0.05 % ophthalmic emulsion, Place 1 drop into both eyes 2 times daily, Disp: , Rfl:     aspirin 81 MG chewable tablet, Take 1 tablet by mouth daily, Disp: , Rfl:     brimonidine-timolol (COMBIGAN) 0.2-0.5 % ophthalmic solution, INSTILL 1 DROP INTO BOTH   EYES TWICE DAILY, Disp: , Rfl:     FEXOFENADINE HCL PO, Take 180 mg by mouth daily, Disp: , Rfl:     Multiple Vitamins-Minerals (PRESERVISION AREDS PO), Take by mouth, Disp: , Rfl:     Omega-3 Fatty Acids (OMEGA 3 500 PO), Take by mouth daily, Disp: , Rfl:     levothyroxine (SYNTHROID) 150 MCG tablet, TAKE ONE TABLET DAILY BY MOUTH, Disp: , Rfl: 3    latanoprost (XALATAN) 0.005 % ophthalmic solution, 1 drop nightly, Disp: , Rfl:     Physical Exam:  her vital signs are /82 (Site: Left Upper Arm, Position: Sitting, Cuff Size: Medium Adult)   Pulse 63   Ht 1.6 m (5' 2.99\")   Wt 60.4 kg (133 lb 4 oz)   SpO2 100%   BMI 23.61 kg/m²      Appearance: alert, well appearing, and in no distress.  Neck: No JVD, no carotid bruits  CV: normal rate, regular rhythm, normal S1, S2, no murmurs, rubs, clicks or gallops  Resp: Chest: clear to auscultation, no wheezes, rales or rhonchi, symmetric air entry.  Sternotomy: clean, dry, healed.  We removed the stitch at the apex of the incision and a chest tube suture was removed.  Leg wound: clean, dry, healed  Left radial incision: Clean dry healing nicely  Vascular: she has well perfused extremities.   Edema: she has no peripheral edema      Studies:    Chest x-ray was reviewed.  She has a normal cardiomediastinal silhouette and clear lung fields.  She has minimal blunting of both costophrenic angles.  Echocardiogram performed earlier today was reviewed.  She has no pericardial effusion.  She has unchanged mild AI and mild to moderate MR.  Her LVEF

## 2025-02-20 ENCOUNTER — HOSPITAL ENCOUNTER (OUTPATIENT)
Dept: GENERAL RADIOLOGY | Age: 75
Discharge: HOME | End: 2025-02-20
Payer: MEDICARE

## 2025-02-20 ENCOUNTER — HOSPITAL ENCOUNTER (OUTPATIENT)
Age: 75
Discharge: HOME | End: 2025-02-20
Payer: MEDICARE

## 2025-02-20 ENCOUNTER — OFFICE VISIT (OUTPATIENT)
Dept: CARDIOLOGY CLINIC | Age: 75
End: 2025-02-20
Payer: MEDICARE

## 2025-02-20 ENCOUNTER — OFFICE VISIT (OUTPATIENT)
Dept: CARDIOTHORACIC SURGERY | Age: 75
End: 2025-02-20

## 2025-02-20 VITALS
DIASTOLIC BLOOD PRESSURE: 82 MMHG | BODY MASS INDEX: 23.64 KG/M2 | SYSTOLIC BLOOD PRESSURE: 132 MMHG | HEART RATE: 59 BPM | WEIGHT: 133.4 LBS | HEIGHT: 63 IN

## 2025-02-20 VITALS
SYSTOLIC BLOOD PRESSURE: 118 MMHG | WEIGHT: 133.25 LBS | BODY MASS INDEX: 23.61 KG/M2 | DIASTOLIC BLOOD PRESSURE: 82 MMHG | OXYGEN SATURATION: 100 % | HEART RATE: 63 BPM | HEIGHT: 63 IN

## 2025-02-20 DIAGNOSIS — Z95.1 S/P CABG (CORONARY ARTERY BYPASS GRAFT): Primary | ICD-10-CM

## 2025-02-20 DIAGNOSIS — R07.9 CHEST PAIN, UNSPECIFIED TYPE: ICD-10-CM

## 2025-02-20 DIAGNOSIS — Z95.1 S/P CABG (CORONARY ARTERY BYPASS GRAFT): ICD-10-CM

## 2025-02-20 DIAGNOSIS — I38 VHD (VALVULAR HEART DISEASE): Primary | ICD-10-CM

## 2025-02-20 LAB
ANION GAP SERPL CALCULATED.3IONS-SCNC: 15 MMOL/L (ref 9–17)
BASOPHILS # BLD: 0.09 K/UL
BASOPHILS NFR BLD: 1 % (ref 0–1)
BUN SERPL-MCNC: 18 MG/DL (ref 7–20)
CALCIUM SERPL-MCNC: 9.6 MG/DL (ref 8.3–10.6)
CHLORIDE SERPL-SCNC: 101 MMOL/L (ref 99–110)
CO2 SERPL-SCNC: 24 MMOL/L (ref 21–32)
CREAT SERPL-MCNC: 0.8 MG/DL (ref 0.6–1.2)
EOSINOPHIL # BLD: 1.16 K/UL
EOSINOPHILS RELATIVE PERCENT: 13 % (ref 0–3)
ERYTHROCYTE [DISTWIDTH] IN BLOOD BY AUTOMATED COUNT: 15.4 % (ref 11.7–14.9)
GFR, ESTIMATED: 75 ML/MIN/1.73M2
GLUCOSE SERPL-MCNC: 110 MG/DL (ref 74–99)
HCT VFR BLD AUTO: 36.2 % (ref 37–47)
HGB BLD-MCNC: 10.7 G/DL (ref 12.5–16)
IMM GRANULOCYTES # BLD AUTO: 0.02 K/UL
IMM GRANULOCYTES NFR BLD: 0 %
LYMPHOCYTES NFR BLD: 1.42 K/UL
LYMPHOCYTES RELATIVE PERCENT: 16 % (ref 24–44)
MCH RBC QN AUTO: 28.7 PG (ref 27–31)
MCHC RBC AUTO-ENTMCNC: 29.6 G/DL (ref 32–36)
MCV RBC AUTO: 97.1 FL (ref 78–100)
MONOCYTES NFR BLD: 0.46 K/UL
MONOCYTES NFR BLD: 5 % (ref 0–4)
NEUTROPHILS NFR BLD: 65 % (ref 36–66)
NEUTS SEG NFR BLD: 5.91 K/UL
PLATELET # BLD AUTO: 448 K/UL (ref 140–440)
PMV BLD AUTO: 10 FL (ref 7.5–11.1)
POTASSIUM SERPL-SCNC: 3.8 MMOL/L (ref 3.5–5.1)
RBC # BLD AUTO: 3.73 M/UL (ref 4.2–5.4)
SODIUM SERPL-SCNC: 140 MMOL/L (ref 136–145)
WBC OTHER # BLD: 9.1 K/UL (ref 4–10.5)

## 2025-02-20 PROCEDURE — 1123F ACP DISCUSS/DSCN MKR DOCD: CPT | Performed by: INTERNAL MEDICINE

## 2025-02-20 PROCEDURE — 3075F SYST BP GE 130 - 139MM HG: CPT | Performed by: INTERNAL MEDICINE

## 2025-02-20 PROCEDURE — 93000 ELECTROCARDIOGRAM COMPLETE: CPT | Performed by: INTERNAL MEDICINE

## 2025-02-20 PROCEDURE — G8427 DOCREV CUR MEDS BY ELIG CLIN: HCPCS | Performed by: INTERNAL MEDICINE

## 2025-02-20 PROCEDURE — G8400 PT W/DXA NO RESULTS DOC: HCPCS | Performed by: INTERNAL MEDICINE

## 2025-02-20 PROCEDURE — 1111F DSCHRG MED/CURRENT MED MERGE: CPT | Performed by: INTERNAL MEDICINE

## 2025-02-20 PROCEDURE — 71046 X-RAY EXAM CHEST 2 VIEWS: CPT

## 2025-02-20 PROCEDURE — 3079F DIAST BP 80-89 MM HG: CPT | Performed by: INTERNAL MEDICINE

## 2025-02-20 PROCEDURE — 99214 OFFICE O/P EST MOD 30 MIN: CPT | Performed by: INTERNAL MEDICINE

## 2025-02-20 PROCEDURE — G8420 CALC BMI NORM PARAMETERS: HCPCS | Performed by: INTERNAL MEDICINE

## 2025-02-20 PROCEDURE — 85025 COMPLETE CBC W/AUTO DIFF WBC: CPT

## 2025-02-20 PROCEDURE — 3017F COLORECTAL CA SCREEN DOC REV: CPT | Performed by: INTERNAL MEDICINE

## 2025-02-20 PROCEDURE — 80048 BASIC METABOLIC PNL TOTAL CA: CPT

## 2025-02-20 PROCEDURE — 1090F PRES/ABSN URINE INCON ASSESS: CPT | Performed by: INTERNAL MEDICINE

## 2025-02-20 PROCEDURE — 1159F MED LIST DOCD IN RCRD: CPT | Performed by: INTERNAL MEDICINE

## 2025-02-20 PROCEDURE — 1036F TOBACCO NON-USER: CPT | Performed by: INTERNAL MEDICINE

## 2025-02-20 RX ORDER — FUROSEMIDE 40 MG/1
40 TABLET ORAL SEE ADMIN INSTRUCTIONS
Qty: 30 TABLET | Refills: 0 | Status: SHIPPED | OUTPATIENT
Start: 2025-02-20

## 2025-02-20 RX ORDER — CLOPIDOGREL BISULFATE 75 MG/1
75 TABLET ORAL DAILY
Qty: 30 TABLET | Refills: 3 | Status: SHIPPED | OUTPATIENT
Start: 2025-02-20

## 2025-02-20 RX ORDER — POTASSIUM CHLORIDE 1500 MG/1
20 TABLET, EXTENDED RELEASE ORAL SEE ADMIN INSTRUCTIONS
Qty: 60 TABLET | Refills: 0 | Status: SHIPPED | OUTPATIENT
Start: 2025-02-20

## 2025-02-20 NOTE — PATIENT INSTRUCTIONS
Thank you for allowing us to care for you today!   We want to ensure we can follow your treatment plan and we strive to give you the best outcomes and experience possible.   If you ever have a life threatening emergency and call 911 - for an ambulance (EMS)  REMEMBER  Our providers can only care for you at:   [facility] or [facility]   Even if you have someone take you or you drive yourself we can only care for you in a [facility] facility. Our providers are not setup at the other healthcare locations!    PLEASE CALL OUR OFFICE DURING NORMAL BUSINESS HOURS  Monday through Friday 8 am to 5 pm  AFTER HOURS the physician on-call cannot help with scheduling, rescheduling, procedure instruction questions or any type of medication need or issue.  [facility] P:[phone] - [facility] P:[phone] - [facility] P:[phone]      If you receive a survey:  We would appreciate you taking the time to share your experience concerning your provider visit in the office.    These surveys are confidential!  We are eager to improve and are counting on you to share your feedback so we can ensure you get the best care possible.

## 2025-02-20 NOTE — PROGRESS NOTES
CARDIOLOGY NOTE      2/20/2025    RE: Rachel Mcdaniel  (1950)                               TO:  Arnoldo Ennis MD            CHIEF COMPLAINT   Rachel is a 75 y.o. female who was seen today for management of  htn                                  Here for follow-up on CABG     HPI:                   The patient does not have cardiac complaints  Patient also seen  for    - Hypertension,is  well controlled, pt is  compliant with medicines  - Hyperlipidimea, importance of hyperlipidimea discussed with pt.       Rachel Mcdaniel has the following history recorded in care path:  Patient Active Problem List    Diagnosis Date Noted    Generalized weakness 02/06/2025    Gait disturbance 02/06/2025    Uncontrolled pain 02/06/2025    Acute blood loss anemia 02/06/2025    Oropharyngeal dysphagia 02/06/2025    Paroxysmal atrial fibrillation (HCC) 02/06/2025    Depression with anxiety 02/06/2025    History of breast cancer 02/06/2025    CHF following cardiac surgery, postop 02/05/2025    Coronary artery disease (CAD) excluded 01/27/2025    Coronary atherosclerosis of native coronary artery 01/27/2025    Aortic regurgitation 01/27/2025    Coronary artery disease involving native heart with unstable angina pectoris (HCC) 01/27/2025    Nonrheumatic aortic valve insufficiency 01/25/2025    Abnormal nuclear stress test 01/17/2025    Other chest pain 01/09/2025    Abnormal EKG 12/07/2018    Family history of coronary artery disease     GERD (gastroesophageal reflux disease)     VHD (valvular heart disease)     Essential hypertension     Mitral valve disease     Hyperlipidemia      Current Outpatient Medications   Medication Sig Dispense Refill    potassium bicarb-citric acid (EFFER-K) 20 MEQ TBEF effervescent tablet Take 1 tablet by mouth daily Do not chew or crush. Dissolve flavored tablets completely in 3 to 4 ounces of cold water; unflavored tablets may be dissolved in 3 to 4 ounces of cold juice. Patient to sip

## 2025-02-20 NOTE — PROGRESS NOTES
CLINICAL STAFF DOCUMENTATION    Dr. Jeancarlos Mcdaniel  1950  [id number]    Have you had any Chest Pain recently? - No      DO EKG IF: Patient has a Heart Rate above 100 or below 40 (Ex:A-fib, Aflutter, PAF, SVT, VT, Bradycardia)      CAD (Coronary Artery Disease) patient should have one on file every 6 months     New Consult or New Patient     If patient is following up from a current Stent/PCI     If patient is following up from a current Cardioversion        Have you had any Shortness of Breath - No    Have you had any dizziness - No    Have you had any palpitations recently? - No      Is the patient on any of the following medications -   If Yes DO EKG - Needs done every 3 months    Do you have any edema - swelling in No    If Yes - CHECK TO SEE IF THE EDEMA IS PITTING    When did you have your last labs drawn 2/2025  What doctor ordered Hospital   Do we have the labs in their chart Yes  If we do not have the labs, ask where they were drawn     If we do not have these labs, you are retrieve these labs for the provider!    Do you have a surgery or procedure scheduled in the near future - No    Do use tobacco products? - No  Do you drink alcohol? - No  Do you use any illicit drugs? - No  Caffeine? - No  How much caffeine? .        Check medication list thoroughly!!! AND RECONCILE OUTSIDE MEDICATIONS  If dose has changed change the entire order not just the MG  BE SURE TO ASK PATIENT IF THEY NEED MEDICATION REFILLS  Verify Pharmacy and update if incorrect    At check out add to every patient's \"wrap up\" the following dot phrase AFTERVISITCARDIOHEARTHOUSE and ensure we explain this to our patients

## 2025-02-24 ENCOUNTER — TELEPHONE (OUTPATIENT)
Dept: CARDIOLOGY CLINIC | Age: 75
End: 2025-02-24

## 2025-03-03 PROBLEM — F41.1 GENERALIZED ANXIETY DISORDER: Status: ACTIVE | Noted: 2025-03-03

## 2025-03-03 NOTE — DISCHARGE SUMMARY
wheelchair.  Good core control.  Alert and oriented.  Fair insight and reasoning.   Pleasant.    HEENT: Visual fields full.  Speech clear.  Neck supple.  MMM.  No JVD.    Pulmonary: Unlabored without wheezes, rales or coughing.    Cardiac: Infrequent premature beats.  Rate controlled.    Abdomen: Patient's abdomen is soft and nondistended.  Bowel sounds were present throughout.  There was no rebound, guarding or masses noted.    Upper extremities: She was able to raise her hands up overhead.  Strong .  No new tremor.  No new bruising.    Lower extremities: Full active movement at the hips, knees and ankles.  No signs of DVT.  No new swelling.    Sitting balance was good.  Standing balance was fair-.    Lab Results   Component Value Date    WBC 9.1 02/20/2025    HGB 10.7 (L) 02/20/2025    HCT 36.2 (L) 02/20/2025    MCV 97.1 02/20/2025     (H) 02/20/2025     Lab Results   Component Value Date    INR 1.1 02/04/2025    INR 1.2 02/02/2025    INR 1.3 01/29/2025    PROTIME 14.1 02/04/2025    PROTIME 15.3 (H) 02/02/2025    PROTIME 16.2 (H) 01/29/2025     Lab Results   Component Value Date    CREATININE 0.8 02/20/2025    BUN 18 02/20/2025     02/20/2025    K 3.8 02/20/2025     02/20/2025    CO2 24 02/20/2025     Lab Results   Component Value Date    ALT 13 01/29/2025    AST 48 (H) 01/29/2025    ALKPHOS 37 (L) 01/29/2025    BILITOT 0.3 01/29/2025         The patient presented to the ARU with the above history requiring a multidisciplinary treatment plan including close medical supervision by the ARU Medical Director. The patient participated in the prescribed therapy treatment plan with reasonable compliance and progressive tolerance. They avoided significant medical complications.    By the time of discharge the patient had become safer with adaptive equipment to transfer and toilet with a FWW with the supervision of family/caregivers. The patient was tolerating an oral diet without choking/coughing

## 2025-03-11 NOTE — PROGRESS NOTES
3/13/2025    Arnoldo Wheeler MD   [address] [facility] [address]    Jeancarlos Solomon MD          Re: Rachel      Dear Dr. Wheeler  Dear Dr. Solomon    I had the pleasure of seeing your patient, Rachel Mcdaniel (75 y.o. female) in the office for follow up after her coronary artery bypass grafting x3 on 1/28/2025.  She comes for a late follow-up visit today.  She recently had a postoperative nuclear stress test which she passed with flying colors.  She was a complex case, as she had previous radiation for breast cancer to her chest wall, and we had a problem that her left internal mammary artery had been used for a free pedicle graft for her her left TRAM flap breast reconstruction many years ago, and her left internal mammary artery was not being usable due to this when we did her CABG surgery.  We therefore used a left radial artery to her LAD coronary artery.  She is feeling well but she noticed a suture protruding slightly at her left radial artery harvest site.  She also has some discomfort in her left inner thigh, however we did perform the greater saphenous vein harvest.  She has no shortness of breath and she has been walking over a mile today.  She is looking forward to start cardiac rehabilitation.  Current Medications    Current Outpatient Medications:     tolterodine (DETROL) 1 MG tablet, Take 4 tablets by mouth daily, Disp: , Rfl:     clopidogrel (PLAVIX) 75 MG tablet, Take 1 tablet by mouth daily, Disp: 30 tablet, Rfl: 3    furosemide (LASIX) 40 MG tablet, Take 1 tablet by mouth See Admin Instructions Take 1 tablet by mouth daily x7 days then take half tablet daily., Disp: 30 tablet, Rfl: 0    potassium chloride (KLOR-CON M) 20 MEQ extended release tablet, Take 1 tablet by mouth See Admin Instructions Take 1 tablet by mouth daily x7 days then take half tablet daily., Disp: 60 tablet, Rfl: 0    amiodarone (CORDARONE) 200 MG tablet, Take 1 tablet by mouth daily, Disp: 30 tablet, Rfl:

## 2025-03-13 ENCOUNTER — OFFICE VISIT (OUTPATIENT)
Dept: CARDIOTHORACIC SURGERY | Age: 75
End: 2025-03-13

## 2025-03-13 VITALS
WEIGHT: 137.6 LBS | SYSTOLIC BLOOD PRESSURE: 114 MMHG | OXYGEN SATURATION: 98 % | HEART RATE: 66 BPM | DIASTOLIC BLOOD PRESSURE: 71 MMHG | HEIGHT: 63 IN | BODY MASS INDEX: 24.38 KG/M2

## 2025-03-13 DIAGNOSIS — Z95.1 S/P CABG (CORONARY ARTERY BYPASS GRAFT): Primary | ICD-10-CM

## 2025-03-13 PROCEDURE — 99024 POSTOP FOLLOW-UP VISIT: CPT | Performed by: THORACIC SURGERY (CARDIOTHORACIC VASCULAR SURGERY)

## 2025-03-13 RX ORDER — TOLTERODINE TARTRATE 1 MG/1
4 TABLET, EXTENDED RELEASE ORAL DAILY
COMMUNITY

## 2025-03-18 RX ORDER — AMIODARONE HYDROCHLORIDE 200 MG/1
200 TABLET ORAL DAILY
Qty: 30 TABLET | Refills: 5 | Status: SHIPPED | OUTPATIENT
Start: 2025-03-18

## 2025-03-18 RX ORDER — ATORVASTATIN CALCIUM 40 MG/1
40 TABLET, FILM COATED ORAL NIGHTLY
Qty: 30 TABLET | Refills: 5 | Status: SHIPPED | OUTPATIENT
Start: 2025-03-18

## 2025-03-18 RX ORDER — AMLODIPINE BESYLATE 2.5 MG/1
2.5 TABLET ORAL DAILY
Qty: 30 TABLET | Refills: 5 | Status: SHIPPED | OUTPATIENT
Start: 2025-03-18

## 2025-03-18 RX ORDER — METOPROLOL TARTRATE 75 MG/1
75 TABLET ORAL 2 TIMES DAILY
Qty: 60 TABLET | Refills: 5 | Status: SHIPPED | OUTPATIENT
Start: 2025-03-18

## 2025-03-18 RX ORDER — FUROSEMIDE 40 MG/1
40 TABLET ORAL SEE ADMIN INSTRUCTIONS
Qty: 30 TABLET | Refills: 5 | Status: SHIPPED | OUTPATIENT
Start: 2025-03-18

## 2025-03-24 DIAGNOSIS — Z95.1 S/P CABG X 3: Primary | ICD-10-CM

## 2025-03-26 ENCOUNTER — OFFICE VISIT (OUTPATIENT)
Dept: CARDIOLOGY CLINIC | Age: 75
End: 2025-03-26
Payer: MEDICARE

## 2025-03-26 VITALS
DIASTOLIC BLOOD PRESSURE: 62 MMHG | HEART RATE: 68 BPM | HEIGHT: 62 IN | SYSTOLIC BLOOD PRESSURE: 124 MMHG | WEIGHT: 141.2 LBS | BODY MASS INDEX: 25.98 KG/M2

## 2025-03-26 DIAGNOSIS — I48.0 PAROXYSMAL ATRIAL FIBRILLATION (HCC): Primary | ICD-10-CM

## 2025-03-26 PROCEDURE — 1159F MED LIST DOCD IN RCRD: CPT | Performed by: INTERNAL MEDICINE

## 2025-03-26 PROCEDURE — 3078F DIAST BP <80 MM HG: CPT | Performed by: INTERNAL MEDICINE

## 2025-03-26 PROCEDURE — G8427 DOCREV CUR MEDS BY ELIG CLIN: HCPCS | Performed by: INTERNAL MEDICINE

## 2025-03-26 PROCEDURE — G8400 PT W/DXA NO RESULTS DOC: HCPCS | Performed by: INTERNAL MEDICINE

## 2025-03-26 PROCEDURE — G8419 CALC BMI OUT NRM PARAM NOF/U: HCPCS | Performed by: INTERNAL MEDICINE

## 2025-03-26 PROCEDURE — 3017F COLORECTAL CA SCREEN DOC REV: CPT | Performed by: INTERNAL MEDICINE

## 2025-03-26 PROCEDURE — 3074F SYST BP LT 130 MM HG: CPT | Performed by: INTERNAL MEDICINE

## 2025-03-26 PROCEDURE — 99214 OFFICE O/P EST MOD 30 MIN: CPT | Performed by: INTERNAL MEDICINE

## 2025-03-26 PROCEDURE — 1123F ACP DISCUSS/DSCN MKR DOCD: CPT | Performed by: INTERNAL MEDICINE

## 2025-03-26 PROCEDURE — 1090F PRES/ABSN URINE INCON ASSESS: CPT | Performed by: INTERNAL MEDICINE

## 2025-03-26 PROCEDURE — 1036F TOBACCO NON-USER: CPT | Performed by: INTERNAL MEDICINE

## 2025-03-26 NOTE — PROGRESS NOTES
CLINICAL STAFF DOCUMENTATION        Rachel K Jonas  1950  [id number]    Have you had any Chest Pain recently? - No          Have you had any Shortness of Breath - No      Have you had any dizziness - No    Have you had any palpitations recently? - No      Do you have any edema - swelling in No          When did you have your last labs drawn 2/20/2025  What doctor ordered ester rebolledo pa-c   Do we have the labs in their chart Yes        Do you have a surgery or procedure scheduled in the near future - No      Caffeine? - No    
dysfunction    History of exercise stress test 12/26/2019    treadmill  Negative for ischemia by diagnostic criteria, Frequent PAC during stress at peak exercise, no chest pain, heavy PAC burden seen in stress    History of left heart catheterization (LHC) 01/23/2025    History of transesophageal echocardiography (BELLE) 01/27/2025    Hx of  Carotid Doppler ultrasound 12/20/2016    Normal echo    Hyperlipidemia     Hypertension     Hypothyroidism     VHD (valvular heart disease)     Mitral valve disease     Past Surgical History:   Procedure Laterality Date    BREAST LUMPECTOMY  , 1/18/02    left, 10/27/06    BREAST SURGERY  6/9/2006    b/l reconstruction    CARDIAC PROCEDURE N/A 1/23/2025    Left heart cath / coronary angiography performed by Jeancarlos Solomon MD at [facility] CARDIAC CATH LAB    CHOLECYSTECTOMY  10/24/2007    CORONARY ARTERY BYPASS GRAFT N/A 1/28/2025    CORONARY ARTERY BYPASS GRAFT X3; LEFT RADIAL ARTERY-LAD; SVG-DIAG; SVG-OM1; LEFT ENDOSCOPIC GREATER SAPHENOUS VEIN HARVEST; LEFT ENDOSCOPIC RADIAL ARETY HARVEST; LEFT ATRIAL APPENDAGE LIGATION WITH A 45MM ATRICLIP; INSERTION OF LEFT FEMORAL ARTERIAL LINE; BELLE; performed by Tunde Byrd MD at [facility] OR    HYSTERECTOMY (CERVIX STATUS UNKNOWN)  1984    total    MASTECTOMY  11/2005    B/L    OTHER SURGICAL HISTORY  12/12/08    arthroscopy    THYROIDECTOMY        As reviewed   Family History   Problem Relation Age of Onset    Stroke Mother     Heart Surgery Father 72        CABG    Heart Attack Father     Hypertension Father     High Cholesterol Father     High Cholesterol Brother     Hypertension Brother     Heart Attack Paternal Uncle     Heart Attack Maternal Grandfather     Heart Attack Paternal Uncle     Heart Attack Paternal Uncle     Heart Attack Paternal Uncle      Social History     Tobacco Use    Smoking status: Never    Smokeless tobacco: Never   Substance Use Topics    Alcohol use: No      Review of Systems:    Constitutional: Negative for

## 2025-03-31 ENCOUNTER — TELEPHONE (OUTPATIENT)
Dept: CARDIOTHORACIC SURGERY | Age: 75
End: 2025-03-31

## 2025-03-31 ENCOUNTER — TELEPHONE (OUTPATIENT)
Dept: CARDIOLOGY CLINIC | Age: 75
End: 2025-03-31

## 2025-03-31 NOTE — TELEPHONE ENCOUNTER
Patient was at  today to see Dr. Solomon.  Dr. Solomon informed patient that she should have been given a card from our office stating that she had a CABG performed by Dr Byrd so that she may present to other physicians when seeing them.  She arrived to our office today asking to obtain.  I informed her that is not something that has ever been given to our patients.  I informed her that only our valve patients are provided with a card and that would be from the device company.  She asked to speak to Marlen Cotter prior to leaving our office.  I told her that I would call Marlen and have her call the patient on her cell at [phone].  Patient aware and fully understands.  I spoke with Marlen to make aware.  Marlen stated that there isn't a card to provide patients with that have undergone a CABG, only the valve patients.  She will call the patient and make aware.

## 2025-03-31 NOTE — TELEPHONE ENCOUNTER
Pt returned monitor and the monitor had no data on it,called pt to discuss placing a different monitor, left vm for pt to return call  Pt called back, the monitor was working when on pt, she will be in @ 1:30 today for replacement

## 2025-04-02 ENCOUNTER — TELEPHONE (OUTPATIENT)
Dept: CARDIOLOGY CLINIC | Age: 75
End: 2025-04-02

## 2025-04-02 ENCOUNTER — HOSPITAL ENCOUNTER (OUTPATIENT)
Dept: CARDIAC REHAB | Age: 75
Setting detail: THERAPIES SERIES
Discharge: HOME | End: 2025-04-02
Payer: MEDICARE

## 2025-04-02 PROCEDURE — G0422 INTENS CARDIAC REHAB W/EXERC: HCPCS

## 2025-04-02 PROCEDURE — G0423 INTENS CARDIAC REHAB NO EXER: HCPCS

## 2025-04-02 NOTE — TELEPHONE ENCOUNTER
Called pt as requested when she dropped off her monitor, she was concerned the light was red when she looked at it yesterday, advised that was normal and we had a full 2 day recording on it. Pt voiced understanding

## 2025-04-03 ENCOUNTER — TELEPHONE (OUTPATIENT)
Dept: CARDIOLOGY CLINIC | Age: 75
End: 2025-04-03

## 2025-04-03 DIAGNOSIS — E78.2 MIXED HYPERLIPIDEMIA: Primary | ICD-10-CM

## 2025-04-03 RX ORDER — FUROSEMIDE 40 MG/1
TABLET ORAL
Qty: 30 TABLET | Refills: 0 | OUTPATIENT
Start: 2025-04-03

## 2025-04-03 NOTE — TELEPHONE ENCOUNTER
Pt has been taking her Atorvastatin 40mg twice a day instead of once daily. She has been taking this medication incorrectly since 3/18. She spoke with pharmacy and they told her to reach out to Dr. Solomon regarding this. She is not able to get medication filled yet since she was taking this medication incorrectly. Too soon.

## 2025-04-03 NOTE — TELEPHONE ENCOUNTER
Discussed with KELSEY Travis NP, patient to get lipid panel drawn. Patient advised and will draw on 4/7.

## 2025-04-07 ENCOUNTER — HOSPITAL ENCOUNTER (OUTPATIENT)
Dept: CARDIAC REHAB | Age: 75
Setting detail: THERAPIES SERIES
Discharge: HOME | End: 2025-04-07
Payer: MEDICARE

## 2025-04-07 ENCOUNTER — TELEPHONE (OUTPATIENT)
Dept: CARDIOLOGY CLINIC | Age: 75
End: 2025-04-07

## 2025-04-07 PROCEDURE — G0422 INTENS CARDIAC REHAB W/EXERC: HCPCS

## 2025-04-07 PROCEDURE — G0423 INTENS CARDIAC REHAB NO EXER: HCPCS

## 2025-04-07 NOTE — TELEPHONE ENCOUNTER
Spoke to pt, pt voiced understanding.        48-hour Holter monitor showing that the predominantly sinus rhythm min  heart rate 56, max is 78 infrequent APCs and PVCs are seen no significant bradycardia or tachyarrhythmias noted  Essentially unremarkable monitor

## 2025-04-08 ENCOUNTER — HOSPITAL ENCOUNTER (OUTPATIENT)
Dept: CARDIAC REHAB | Age: 75
Setting detail: THERAPIES SERIES
Discharge: HOME | End: 2025-04-08
Payer: MEDICARE

## 2025-04-08 ENCOUNTER — HOSPITAL ENCOUNTER (OUTPATIENT)
Age: 75
Discharge: HOME | End: 2025-04-08
Payer: MEDICARE

## 2025-04-08 DIAGNOSIS — E78.2 MIXED HYPERLIPIDEMIA: ICD-10-CM

## 2025-04-08 DIAGNOSIS — I38 VHD (VALVULAR HEART DISEASE): ICD-10-CM

## 2025-04-08 DIAGNOSIS — R07.9 CHEST PAIN, UNSPECIFIED TYPE: ICD-10-CM

## 2025-04-08 LAB
CHOLEST SERPL-MCNC: 128 MG/DL (ref 125–199)
HDLC SERPL-MCNC: 51 MG/DL
LDLC SERPL CALC-MCNC: 52 MG/DL
TRIGL SERPL-MCNC: 121 MG/DL
TSH SERPL DL<=0.05 MIU/L-ACNC: 1.27 UIU/ML (ref 0.27–4.2)

## 2025-04-08 PROCEDURE — G0423 INTENS CARDIAC REHAB NO EXER: HCPCS

## 2025-04-08 PROCEDURE — G0422 INTENS CARDIAC REHAB W/EXERC: HCPCS

## 2025-04-08 PROCEDURE — 80061 LIPID PANEL: CPT

## 2025-04-08 PROCEDURE — 84443 ASSAY THYROID STIM HORMONE: CPT

## 2025-04-10 ENCOUNTER — HOSPITAL ENCOUNTER (OUTPATIENT)
Dept: CARDIAC REHAB | Age: 75
Setting detail: THERAPIES SERIES
Discharge: HOME | End: 2025-04-10
Payer: MEDICARE

## 2025-04-10 PROCEDURE — G0423 INTENS CARDIAC REHAB NO EXER: HCPCS

## 2025-04-10 PROCEDURE — G0422 INTENS CARDIAC REHAB W/EXERC: HCPCS

## 2025-04-15 ENCOUNTER — HOSPITAL ENCOUNTER (OUTPATIENT)
Dept: CARDIAC REHAB | Age: 75
Setting detail: THERAPIES SERIES
Discharge: HOME | End: 2025-04-15
Payer: MEDICARE

## 2025-04-15 PROCEDURE — G0423 INTENS CARDIAC REHAB NO EXER: HCPCS

## 2025-04-15 PROCEDURE — G0422 INTENS CARDIAC REHAB W/EXERC: HCPCS

## 2025-04-17 ENCOUNTER — HOSPITAL ENCOUNTER (OUTPATIENT)
Dept: CARDIAC REHAB | Age: 75
Setting detail: THERAPIES SERIES
Discharge: HOME | End: 2025-04-17
Payer: MEDICARE

## 2025-04-17 PROCEDURE — G0422 INTENS CARDIAC REHAB W/EXERC: HCPCS

## 2025-04-17 PROCEDURE — G0423 INTENS CARDIAC REHAB NO EXER: HCPCS

## 2025-04-21 ENCOUNTER — HOSPITAL ENCOUNTER (OUTPATIENT)
Dept: CARDIAC REHAB | Age: 75
Setting detail: THERAPIES SERIES
Discharge: HOME | End: 2025-04-21
Payer: MEDICARE

## 2025-04-21 PROCEDURE — G0422 INTENS CARDIAC REHAB W/EXERC: HCPCS

## 2025-04-21 PROCEDURE — G0423 INTENS CARDIAC REHAB NO EXER: HCPCS

## 2025-04-22 ENCOUNTER — HOSPITAL ENCOUNTER (OUTPATIENT)
Dept: CARDIAC REHAB | Age: 75
Setting detail: THERAPIES SERIES
Discharge: HOME | End: 2025-04-22
Payer: MEDICARE

## 2025-04-22 PROCEDURE — G0422 INTENS CARDIAC REHAB W/EXERC: HCPCS

## 2025-04-22 PROCEDURE — G0423 INTENS CARDIAC REHAB NO EXER: HCPCS

## 2025-04-24 ENCOUNTER — HOSPITAL ENCOUNTER (OUTPATIENT)
Dept: CARDIAC REHAB | Age: 75
Setting detail: THERAPIES SERIES
Discharge: HOME | End: 2025-04-24
Payer: MEDICARE

## 2025-04-24 PROCEDURE — G0423 INTENS CARDIAC REHAB NO EXER: HCPCS

## 2025-04-24 PROCEDURE — G0422 INTENS CARDIAC REHAB W/EXERC: HCPCS

## 2025-04-28 ENCOUNTER — HOSPITAL ENCOUNTER (OUTPATIENT)
Dept: CARDIAC REHAB | Age: 75
Setting detail: THERAPIES SERIES
Discharge: HOME | End: 2025-04-28
Payer: MEDICARE

## 2025-04-28 PROCEDURE — G0423 INTENS CARDIAC REHAB NO EXER: HCPCS

## 2025-04-28 PROCEDURE — G0422 INTENS CARDIAC REHAB W/EXERC: HCPCS

## 2025-04-29 ENCOUNTER — HOSPITAL ENCOUNTER (OUTPATIENT)
Dept: CARDIAC REHAB | Age: 75
Setting detail: THERAPIES SERIES
Discharge: HOME | End: 2025-04-29
Payer: MEDICARE

## 2025-04-29 PROCEDURE — G0422 INTENS CARDIAC REHAB W/EXERC: HCPCS

## 2025-04-29 PROCEDURE — G0423 INTENS CARDIAC REHAB NO EXER: HCPCS

## 2025-05-01 ENCOUNTER — HOSPITAL ENCOUNTER (OUTPATIENT)
Dept: CARDIAC REHAB | Age: 75
Setting detail: THERAPIES SERIES
Discharge: HOME | End: 2025-05-01
Payer: MEDICARE

## 2025-05-01 PROCEDURE — G0422 INTENS CARDIAC REHAB W/EXERC: HCPCS

## 2025-05-01 PROCEDURE — G0423 INTENS CARDIAC REHAB NO EXER: HCPCS

## 2025-05-05 ENCOUNTER — HOSPITAL ENCOUNTER (OUTPATIENT)
Dept: CARDIAC REHAB | Age: 75
Setting detail: THERAPIES SERIES
Discharge: HOME | End: 2025-05-05
Payer: MEDICARE

## 2025-05-05 PROCEDURE — G0423 INTENS CARDIAC REHAB NO EXER: HCPCS

## 2025-05-05 PROCEDURE — G0422 INTENS CARDIAC REHAB W/EXERC: HCPCS

## 2025-05-06 ENCOUNTER — HOSPITAL ENCOUNTER (OUTPATIENT)
Dept: CARDIAC REHAB | Age: 75
Setting detail: THERAPIES SERIES
Discharge: HOME | End: 2025-05-06
Payer: MEDICARE

## 2025-05-06 PROCEDURE — G0423 INTENS CARDIAC REHAB NO EXER: HCPCS

## 2025-05-06 PROCEDURE — G0422 INTENS CARDIAC REHAB W/EXERC: HCPCS

## 2025-05-08 ENCOUNTER — HOSPITAL ENCOUNTER (OUTPATIENT)
Dept: CARDIAC REHAB | Age: 75
Setting detail: THERAPIES SERIES
Discharge: HOME | End: 2025-05-08
Payer: MEDICARE

## 2025-05-08 PROCEDURE — G0422 INTENS CARDIAC REHAB W/EXERC: HCPCS

## 2025-05-08 PROCEDURE — G0423 INTENS CARDIAC REHAB NO EXER: HCPCS

## 2025-05-12 ENCOUNTER — HOSPITAL ENCOUNTER (OUTPATIENT)
Dept: CARDIAC REHAB | Age: 75
Setting detail: THERAPIES SERIES
Discharge: HOME | End: 2025-05-12
Payer: MEDICARE

## 2025-05-12 PROCEDURE — G0422 INTENS CARDIAC REHAB W/EXERC: HCPCS

## 2025-05-12 PROCEDURE — G0423 INTENS CARDIAC REHAB NO EXER: HCPCS

## 2025-05-13 ENCOUNTER — HOSPITAL ENCOUNTER (OUTPATIENT)
Dept: CARDIAC REHAB | Age: 75
Setting detail: THERAPIES SERIES
Discharge: HOME | End: 2025-05-13
Payer: MEDICARE

## 2025-05-13 PROCEDURE — G0423 INTENS CARDIAC REHAB NO EXER: HCPCS

## 2025-05-13 PROCEDURE — G0422 INTENS CARDIAC REHAB W/EXERC: HCPCS

## 2025-05-19 ENCOUNTER — HOSPITAL ENCOUNTER (OUTPATIENT)
Dept: CARDIAC REHAB | Age: 75
Setting detail: THERAPIES SERIES
Discharge: HOME | End: 2025-05-19
Payer: MEDICARE

## 2025-05-19 PROCEDURE — G0423 INTENS CARDIAC REHAB NO EXER: HCPCS

## 2025-05-19 PROCEDURE — G0422 INTENS CARDIAC REHAB W/EXERC: HCPCS

## 2025-05-20 ENCOUNTER — HOSPITAL ENCOUNTER (OUTPATIENT)
Dept: CARDIAC REHAB | Age: 75
Setting detail: THERAPIES SERIES
Discharge: HOME | End: 2025-05-20
Payer: MEDICARE

## 2025-05-20 PROCEDURE — G0423 INTENS CARDIAC REHAB NO EXER: HCPCS

## 2025-05-20 PROCEDURE — G0422 INTENS CARDIAC REHAB W/EXERC: HCPCS

## 2025-05-22 ENCOUNTER — HOSPITAL ENCOUNTER (OUTPATIENT)
Dept: CARDIAC REHAB | Age: 75
Setting detail: THERAPIES SERIES
Discharge: HOME | End: 2025-05-22
Payer: MEDICARE

## 2025-05-22 PROCEDURE — G0423 INTENS CARDIAC REHAB NO EXER: HCPCS

## 2025-05-22 PROCEDURE — G0422 INTENS CARDIAC REHAB W/EXERC: HCPCS

## 2025-05-22 NOTE — PROGRESS NOTES
Cardiac Rehab Outpatient Medical Nutrition Therapy 2025   8:56-9:50     Client History:    Pertinent medications:   Current Outpatient Medications   Medication Instructions    amiodarone (CORDARONE) 200 mg, Oral, DAILY    amLODIPine (NORVASC) 2.5 mg, Oral, DAILY    aspirin 81 mg, DAILY    atorvastatin (LIPITOR) 40 mg, Oral, NIGHTLY    brimonidine-timolol (COMBIGAN) 0.2-0.5 % ophthalmic solution INSTILL 1 DROP INTO BOTH   EYES TWICE DAILY    clopidogrel (PLAVIX) 75 mg, Oral, DAILY    cycloSPORINE (RESTASIS) 0.05 % ophthalmic emulsion 1 drop, 2 TIMES DAILY    FEXOFENADINE HCL  mg, DAILY    latanoprost (XALATAN) 0.005 % ophthalmic solution 1 drop, NIGHTLY    levothyroxine (SYNTHROID) 150 MCG tablet TAKE ONE TABLET DAILY BY MOUTH    Metoprolol Tartrate 75 mg, Oral, 2 TIMES DAILY    Multiple Vitamins-Minerals (PRESERVISION AREDS PO) Take by mouth    Omega-3 Fatty Acids (OMEGA 3 500 PO) DAILY    tolterodine (DETROL) 4 mg, DAILY      Social hx: Retired for several years. Spouse . Currently living with son and daughter-in-law for past 5 years. Does not use tobacco or drink alcohol. Does cooking and grocery shopping for the family.       Family hx: father CAD     Pertinent Medical hx:   Allergic rhinitis  Breast cancer (HCC)  Depression  Family history of coronary artery disease  Fibroids  Frozen shoulder  GERD (gastroesophageal reflux disease)  H/O cardiovascular stress test  H/O chest x-ray  H/O echocardiogram  History of exercise stress test  History of left heart catheterization (LHC)  History of transesophageal echocardiography (BELLE)  Hx of  Carotid Doppler ultrasound  Hyperlipidemia  Hypertension  Hypothyroidism  VHD (valvular heart disease)    CABG 2025    Activity habits: Currently attending exercise sessions in cardiac rehab program 3 days per week. Plans to walk more often, may use treadmill at home.    Food/nutrition habits: Usually eats 3 meal per day. Was on plan for intermittent fasting,

## 2025-05-27 ENCOUNTER — HOSPITAL ENCOUNTER (OUTPATIENT)
Dept: CARDIAC REHAB | Age: 75
Setting detail: THERAPIES SERIES
Discharge: HOME | End: 2025-05-27
Payer: MEDICARE

## 2025-05-27 PROCEDURE — G0422 INTENS CARDIAC REHAB W/EXERC: HCPCS

## 2025-05-27 PROCEDURE — G0423 INTENS CARDIAC REHAB NO EXER: HCPCS

## 2025-05-29 ENCOUNTER — APPOINTMENT (OUTPATIENT)
Dept: CARDIAC REHAB | Age: 75
End: 2025-05-29
Payer: MEDICARE

## 2025-06-02 ENCOUNTER — HOSPITAL ENCOUNTER (OUTPATIENT)
Dept: CARDIAC REHAB | Age: 75
Setting detail: THERAPIES SERIES
Discharge: HOME | End: 2025-06-02
Payer: MEDICARE

## 2025-06-02 PROCEDURE — G0422 INTENS CARDIAC REHAB W/EXERC: HCPCS

## 2025-06-02 PROCEDURE — G0423 INTENS CARDIAC REHAB NO EXER: HCPCS

## 2025-06-03 ENCOUNTER — HOSPITAL ENCOUNTER (OUTPATIENT)
Dept: CARDIAC REHAB | Age: 75
Setting detail: THERAPIES SERIES
Discharge: HOME | End: 2025-06-03
Payer: MEDICARE

## 2025-06-03 PROCEDURE — G0423 INTENS CARDIAC REHAB NO EXER: HCPCS

## 2025-06-03 PROCEDURE — G0422 INTENS CARDIAC REHAB W/EXERC: HCPCS

## 2025-06-05 ENCOUNTER — HOSPITAL ENCOUNTER (OUTPATIENT)
Dept: CARDIAC REHAB | Age: 75
Setting detail: THERAPIES SERIES
Discharge: HOME | End: 2025-06-05
Payer: MEDICARE

## 2025-06-05 PROCEDURE — G0422 INTENS CARDIAC REHAB W/EXERC: HCPCS

## 2025-06-05 PROCEDURE — G0423 INTENS CARDIAC REHAB NO EXER: HCPCS

## 2025-06-10 ENCOUNTER — HOSPITAL ENCOUNTER (OUTPATIENT)
Dept: CARDIAC REHAB | Age: 75
Setting detail: THERAPIES SERIES
Discharge: HOME | End: 2025-06-10
Payer: MEDICARE

## 2025-06-10 PROCEDURE — G0423 INTENS CARDIAC REHAB NO EXER: HCPCS

## 2025-06-10 PROCEDURE — G0422 INTENS CARDIAC REHAB W/EXERC: HCPCS

## 2025-06-12 ENCOUNTER — HOSPITAL ENCOUNTER (OUTPATIENT)
Dept: CARDIAC REHAB | Age: 75
Setting detail: THERAPIES SERIES
Discharge: HOME | End: 2025-06-12
Payer: MEDICARE

## 2025-06-12 PROCEDURE — G0423 INTENS CARDIAC REHAB NO EXER: HCPCS

## 2025-06-12 PROCEDURE — G0422 INTENS CARDIAC REHAB W/EXERC: HCPCS

## 2025-06-16 ENCOUNTER — HOSPITAL ENCOUNTER (OUTPATIENT)
Dept: CARDIAC REHAB | Age: 75
Setting detail: THERAPIES SERIES
Discharge: HOME | End: 2025-06-16
Payer: MEDICARE

## 2025-06-16 PROCEDURE — G0422 INTENS CARDIAC REHAB W/EXERC: HCPCS

## 2025-06-16 PROCEDURE — G0423 INTENS CARDIAC REHAB NO EXER: HCPCS

## 2025-06-17 ENCOUNTER — HOSPITAL ENCOUNTER (OUTPATIENT)
Dept: CARDIAC REHAB | Age: 75
Setting detail: THERAPIES SERIES
Discharge: HOME | End: 2025-06-17
Payer: MEDICARE

## 2025-06-17 PROCEDURE — G0422 INTENS CARDIAC REHAB W/EXERC: HCPCS

## 2025-06-17 PROCEDURE — G0423 INTENS CARDIAC REHAB NO EXER: HCPCS

## 2025-06-19 ENCOUNTER — HOSPITAL ENCOUNTER (OUTPATIENT)
Dept: CARDIAC REHAB | Age: 75
Setting detail: THERAPIES SERIES
Discharge: HOME | End: 2025-06-19
Payer: MEDICARE

## 2025-06-19 PROCEDURE — G0422 INTENS CARDIAC REHAB W/EXERC: HCPCS

## 2025-06-19 PROCEDURE — G0423 INTENS CARDIAC REHAB NO EXER: HCPCS

## 2025-06-26 ENCOUNTER — TELEPHONE (OUTPATIENT)
Dept: CARDIOLOGY CLINIC | Age: 75
End: 2025-06-26

## 2025-06-26 ENCOUNTER — OFFICE VISIT (OUTPATIENT)
Dept: CARDIOLOGY CLINIC | Age: 75
End: 2025-06-26
Payer: MEDICARE

## 2025-06-26 ENCOUNTER — HOSPITAL ENCOUNTER (OUTPATIENT)
Dept: CARDIAC REHAB | Age: 75
Setting detail: THERAPIES SERIES
Discharge: HOME | End: 2025-06-26
Payer: MEDICARE

## 2025-06-26 VITALS
BODY MASS INDEX: 25.59 KG/M2 | SYSTOLIC BLOOD PRESSURE: 126 MMHG | WEIGHT: 144.4 LBS | HEART RATE: 64 BPM | DIASTOLIC BLOOD PRESSURE: 78 MMHG | HEIGHT: 63 IN

## 2025-06-26 DIAGNOSIS — I38 VHD (VALVULAR HEART DISEASE): Primary | ICD-10-CM

## 2025-06-26 DIAGNOSIS — I48.0 PAROXYSMAL ATRIAL FIBRILLATION (HCC): Primary | ICD-10-CM

## 2025-06-26 PROCEDURE — G8400 PT W/DXA NO RESULTS DOC: HCPCS | Performed by: INTERNAL MEDICINE

## 2025-06-26 PROCEDURE — 1123F ACP DISCUSS/DSCN MKR DOCD: CPT | Performed by: INTERNAL MEDICINE

## 2025-06-26 PROCEDURE — 99214 OFFICE O/P EST MOD 30 MIN: CPT | Performed by: INTERNAL MEDICINE

## 2025-06-26 PROCEDURE — 3074F SYST BP LT 130 MM HG: CPT | Performed by: INTERNAL MEDICINE

## 2025-06-26 PROCEDURE — 3017F COLORECTAL CA SCREEN DOC REV: CPT | Performed by: INTERNAL MEDICINE

## 2025-06-26 PROCEDURE — G0423 INTENS CARDIAC REHAB NO EXER: HCPCS

## 2025-06-26 PROCEDURE — G8419 CALC BMI OUT NRM PARAM NOF/U: HCPCS | Performed by: INTERNAL MEDICINE

## 2025-06-26 PROCEDURE — 1036F TOBACCO NON-USER: CPT | Performed by: INTERNAL MEDICINE

## 2025-06-26 PROCEDURE — G8427 DOCREV CUR MEDS BY ELIG CLIN: HCPCS | Performed by: INTERNAL MEDICINE

## 2025-06-26 PROCEDURE — G0422 INTENS CARDIAC REHAB W/EXERC: HCPCS

## 2025-06-26 PROCEDURE — 3078F DIAST BP <80 MM HG: CPT | Performed by: INTERNAL MEDICINE

## 2025-06-26 PROCEDURE — 1090F PRES/ABSN URINE INCON ASSESS: CPT | Performed by: INTERNAL MEDICINE

## 2025-06-26 PROCEDURE — 1159F MED LIST DOCD IN RCRD: CPT | Performed by: INTERNAL MEDICINE

## 2025-06-26 RX ORDER — AZITHROMYCIN 500 MG/1
500 TABLET, FILM COATED ORAL ONCE
Status: DISCONTINUED | OUTPATIENT
Start: 2025-06-26 | End: 2025-06-27

## 2025-06-26 RX ORDER — AZITHROMYCIN 500 MG/1
TABLET, FILM COATED ORAL
Qty: 1 TABLET | Refills: 3 | Status: SHIPPED | OUTPATIENT
Start: 2025-06-26 | End: 2025-06-26

## 2025-06-26 NOTE — PROGRESS NOTES
CLINICAL STAFF DOCUMENTATION    Dr. Jeancarlos Mcdaniel  1950  [id number]    Have you had any Chest Pain recently? - No  \Have you had any Shortness of Breath - No  Have you had any dizziness - No  Have you had any palpitations recently? - No  Do you have any edema - swelling in No      Do you have a surgery or procedure scheduled in the near future - No  Do use tobacco products? - No  Do you drink alcohol? - No  Do you use any illicit drugs? - No  Caffeine? - Yes  How much caffeine? .1  cups       Check medication list thoroughly!!! AND RECONCILE OUTSIDE MEDICATIONS  If dose has changed change the entire order not just the MG  BE SURE TO ASK PATIENT IF THEY NEED MEDICATION REFILLS  Verify Pharmacy and update if incorrect    Add to every patient's \"wrap up\" the following dot phrase AFTERVISITCARDIOHEARTHOUSE and ensure we explain this to our patients

## 2025-06-26 NOTE — PROGRESS NOTES
CARDIOLOGY NOTE      6/26/2025    RE: Rachel Mcdaniel  (1950)                               TO:  Arnoldo Ennis MD            CHIEF COMPLAINT   Rachel is a 75 y.o. female who was seen today for management of  htn                                  Here for follow-up     HPI:                   The patient does not have cardiac complaints  Patient also seen  for    - Hypertension,is  well controlled, pt is  compliant with medicines  - Hyperlipidimea, importance of hyperlipidimea discussed with pt.     Sp  cabg 1/28/25       Rachel Mcdaniel has the following history recorded in care path:  Patient Active Problem List    Diagnosis Date Noted    Generalized anxiety disorder 03/03/2025    Generalized weakness 02/06/2025    Gait disturbance 02/06/2025    Uncontrolled pain 02/06/2025    Acute blood loss anemia 02/06/2025    Oropharyngeal dysphagia 02/06/2025    Paroxysmal atrial fibrillation (HCC) 02/06/2025    Depression with anxiety 02/06/2025    History of breast cancer 02/06/2025    CHF following cardiac surgery, postop 02/05/2025    Coronary artery disease (CAD) excluded 01/27/2025    Coronary atherosclerosis of native coronary artery 01/27/2025    Aortic regurgitation 01/27/2025    Coronary artery disease involving native heart with unstable angina pectoris (HCC) 01/27/2025    Nonrheumatic aortic valve insufficiency 01/25/2025    Abnormal nuclear stress test 01/17/2025    Other chest pain 01/09/2025    Abnormal EKG 12/07/2018    Family history of coronary artery disease     GERD (gastroesophageal reflux disease)     VHD (valvular heart disease)     Essential hypertension     Mitral valve disease     Hyperlipidemia      Current Outpatient Medications   Medication Sig Dispense Refill    atorvastatin (LIPITOR) 40 MG tablet Take 1 tablet by mouth nightly 30 tablet 5    Metoprolol Tartrate 75 MG TABS Take 75 mg by mouth 2 times daily 60 tablet 5    amLODIPine (NORVASC) 2.5 MG tablet Take 1 tablet by mouth

## 2025-06-27 ENCOUNTER — TELEPHONE (OUTPATIENT)
Dept: CARDIOLOGY CLINIC | Age: 75
End: 2025-06-27

## 2025-06-27 DIAGNOSIS — I05.9 MITRAL VALVE DISEASE: Primary | ICD-10-CM

## 2025-06-27 RX ORDER — AZITHROMYCIN 500 MG/1
500 TABLET, FILM COATED ORAL ONCE
Status: SHIPPED | OUTPATIENT
Start: 2025-06-27

## 2025-06-27 NOTE — TELEPHONE ENCOUNTER
[facility]     Dr. Jeancarlos Solomon     Transesophageal Echocardiogram    Patient Name: Rachel Mcdaniel   : 1950   MRN# [id number]     Date of Procedure: 25 Time: 11 Am Arrival Time: 10 Am   (Arrival time is scheduled for one (1) hour before procedure is scheduled.)     Hospital: [facility] ([facility])     X   If you have received orders for blood work and or a chest x-ray, please have         them done on assigned date at [facility],         [facility], or [facility].    X   Please do not have anything by mouth after midnight prior to or 8 hours before         the procedure.    X   You may take your medication with a sip of water unless advised otherwise below.

## 2025-06-27 NOTE — TELEPHONE ENCOUNTER
Thank you when I called to inform pt, she requested 2 more orders of the same stating she knows she will have at least 2 more procedures

## 2025-06-27 NOTE — TELEPHONE ENCOUNTER
Called pt to notify of procedure date and time. Pt states she will be out of town all of next week and that she already texted  to tell him. Pt stated  stated she could have her procedure moved to 7/9/25. Verified with  via perfect serve. Heart center notified. Pt notified someone with procedure scheduling will reach out with date and time as soon as procedure was moved on heart centers schedule. Pt voiced understanding.

## 2025-06-27 NOTE — TELEPHONE ENCOUNTER
Pt was to get a prescription for antibotic for pre dental appt and the dr put in a zpack instead, this needs changed and sent back to [facility] in [place]

## 2025-06-30 ENCOUNTER — TELEPHONE (OUTPATIENT)
Dept: CARDIOLOGY CLINIC | Age: 75
End: 2025-06-30

## 2025-06-30 NOTE — TELEPHONE ENCOUNTER
[facility]     Dr. Jeancarlos Solomon     Transesophageal Echocardiogram    Patient Name: Rachel Mcdaniel   : 1950   MRN# [id number]     Date of Procedure:  Time: 9 Arrival Time: 8   (Arrival time is scheduled for one (1) hour before procedure is scheduled.)     Hospital: [facility] ([facility])     X   If you have received orders for blood work and or a chest x-ray, please have         them done on assigned date at [facility],         [facility], or [facility]    X   Please do not have anything by mouth after midnight prior to or 8 hours before         the procedure.    X   You may take your medication with a sip of water unless advised otherwise below.

## 2025-07-03 NOTE — TELEPHONE ENCOUNTER
Patient given instructions over telephone on 7/3/25.  Procedure is scheduled for 7/9/25 @ 9 AM, w/arrival @ 8 Am, @ [facility]. Medication/Education Letter gone over with patient. Procedure and risks were explained to patient. Questions answered, Patient voiced understanding.        Patient was notified that procedure date or time could be changed due to an emergency. Patient voiced understanding.

## 2025-07-07 ENCOUNTER — HOSPITAL ENCOUNTER (OUTPATIENT)
Dept: CARDIAC REHAB | Age: 75
Setting detail: THERAPIES SERIES
Discharge: HOME | End: 2025-07-07
Payer: MEDICARE

## 2025-07-07 PROCEDURE — G0423 INTENS CARDIAC REHAB NO EXER: HCPCS

## 2025-07-07 PROCEDURE — G0422 INTENS CARDIAC REHAB W/EXERC: HCPCS

## 2025-07-08 ENCOUNTER — HOSPITAL ENCOUNTER (OUTPATIENT)
Dept: CARDIAC REHAB | Age: 75
Setting detail: THERAPIES SERIES
Discharge: HOME | End: 2025-07-08
Payer: MEDICARE

## 2025-07-08 ENCOUNTER — TELEPHONE (OUTPATIENT)
Dept: CARDIOLOGY CLINIC | Age: 75
End: 2025-07-08

## 2025-07-08 PROCEDURE — G0422 INTENS CARDIAC REHAB W/EXERC: HCPCS

## 2025-07-08 PROCEDURE — G0423 INTENS CARDIAC REHAB NO EXER: HCPCS

## 2025-07-09 ENCOUNTER — HOSPITAL ENCOUNTER (OUTPATIENT)
Dept: NON INVASIVE DIAGNOSTICS | Age: 75
Discharge: HOME | End: 2025-07-11
Attending: INTERNAL MEDICINE
Payer: MEDICARE

## 2025-07-09 VITALS
OXYGEN SATURATION: 97 % | HEIGHT: 63 IN | TEMPERATURE: 98.3 F | SYSTOLIC BLOOD PRESSURE: 161 MMHG | RESPIRATION RATE: 17 BRPM | WEIGHT: 144 LBS | HEART RATE: 69 BPM | DIASTOLIC BLOOD PRESSURE: 75 MMHG | BODY MASS INDEX: 25.52 KG/M2

## 2025-07-09 DIAGNOSIS — I48.0 PAROXYSMAL ATRIAL FIBRILLATION (HCC): ICD-10-CM

## 2025-07-09 LAB
ECHO BSA: 1.7 M2
ECHO MV REGURGITANT ALIASING (NYQUIST) VELOCITY: 37 CM/S
ECHO MV REGURGITANT PEAK GRADIENT: 149 MMHG
ECHO MV REGURGITANT PEAK VELOCITY: 6.1 M/S
ECHO MV REGURGITANT RADIUS PISA: 0.8 CM
ECHO MV REGURGITANT VTIA: 217 CM

## 2025-07-09 PROCEDURE — 7100000011 HC PHASE II RECOVERY - ADDTL 15 MIN: Performed by: INTERNAL MEDICINE

## 2025-07-09 PROCEDURE — 7100000010 HC PHASE II RECOVERY - FIRST 15 MIN: Performed by: INTERNAL MEDICINE

## 2025-07-09 PROCEDURE — 6370000000 HC RX 637 (ALT 250 FOR IP): Performed by: INTERNAL MEDICINE

## 2025-07-09 PROCEDURE — 93312 ECHO TRANSESOPHAGEAL: CPT

## 2025-07-09 PROCEDURE — 6360000002 HC RX W HCPCS: Performed by: INTERNAL MEDICINE

## 2025-07-09 PROCEDURE — 99152 MOD SED SAME PHYS/QHP 5/>YRS: CPT | Performed by: INTERNAL MEDICINE

## 2025-07-09 RX ORDER — LIDOCAINE HYDROCHLORIDE 20 MG/ML
SOLUTION OROPHARYNGEAL PRN
Status: COMPLETED | OUTPATIENT
Start: 2025-07-09 | End: 2025-07-09

## 2025-07-09 RX ORDER — MIDAZOLAM HYDROCHLORIDE 1 MG/ML
INJECTION, SOLUTION INTRAMUSCULAR; INTRAVENOUS PRN
Status: COMPLETED | OUTPATIENT
Start: 2025-07-09 | End: 2025-07-09

## 2025-07-09 RX ADMIN — MIDAZOLAM 2 MG: 1 INJECTION INTRAMUSCULAR; INTRAVENOUS at 09:01

## 2025-07-09 RX ADMIN — MIDAZOLAM 1 MG: 1 INJECTION INTRAMUSCULAR; INTRAVENOUS at 09:07

## 2025-07-09 RX ADMIN — LIDOCAINE HYDROCHLORIDE 15 ML: 20 SOLUTION ORAL at 08:59

## 2025-07-09 RX ADMIN — MIDAZOLAM 2 MG: 1 INJECTION INTRAMUSCULAR; INTRAVENOUS at 09:03

## 2025-07-09 RX ADMIN — MIDAZOLAM 1 MG: 1 INJECTION INTRAMUSCULAR; INTRAVENOUS at 09:06

## 2025-07-09 RX ADMIN — MIDAZOLAM 1 MG: 1 INJECTION INTRAMUSCULAR; INTRAVENOUS at 09:09

## 2025-07-09 RX ADMIN — MIDAZOLAM 1 MG: 1 INJECTION INTRAMUSCULAR; INTRAVENOUS at 09:15

## 2025-07-10 ENCOUNTER — HOSPITAL ENCOUNTER (OUTPATIENT)
Dept: CARDIAC REHAB | Age: 75
Setting detail: THERAPIES SERIES
Discharge: HOME | End: 2025-07-10
Payer: MEDICARE

## 2025-07-10 PROCEDURE — G0422 INTENS CARDIAC REHAB W/EXERC: HCPCS

## 2025-07-10 PROCEDURE — G0423 INTENS CARDIAC REHAB NO EXER: HCPCS

## 2025-07-11 ENCOUNTER — TELEPHONE (OUTPATIENT)
Dept: CARDIOLOGY CLINIC | Age: 75
End: 2025-07-11

## 2025-07-11 NOTE — TELEPHONE ENCOUNTER
Pt was asking if she could discon her clopidogrel she had testing done to see and she is just wanting to know if it was decided for her to stop

## 2025-07-14 ENCOUNTER — HOSPITAL ENCOUNTER (OUTPATIENT)
Dept: CARDIAC REHAB | Age: 75
Setting detail: THERAPIES SERIES
Discharge: HOME | End: 2025-07-14
Payer: MEDICARE

## 2025-07-14 PROCEDURE — G0423 INTENS CARDIAC REHAB NO EXER: HCPCS

## 2025-07-14 PROCEDURE — G0422 INTENS CARDIAC REHAB W/EXERC: HCPCS

## 2025-07-14 RX ORDER — CLOBETASOL PROPIONATE 0.5 MG/G
OINTMENT TOPICAL
COMMUNITY
End: 2025-07-16 | Stop reason: ALTCHOICE

## 2025-07-14 RX ORDER — LEVOCETIRIZINE DIHYDROCHLORIDE 5 MG/1
5 TABLET, FILM COATED ORAL
COMMUNITY

## 2025-07-14 RX ORDER — AMLODIPINE BESYLATE 2.5 MG/1
2.5 TABLET ORAL DAILY
Qty: 90 TABLET | Refills: 1 | Status: SHIPPED | OUTPATIENT
Start: 2025-07-14

## 2025-07-15 ENCOUNTER — TELEPHONE (OUTPATIENT)
Dept: CARDIOLOGY CLINIC | Age: 75
End: 2025-07-15

## 2025-07-15 ENCOUNTER — HOSPITAL ENCOUNTER (OUTPATIENT)
Dept: CARDIAC REHAB | Age: 75
Setting detail: THERAPIES SERIES
Discharge: HOME | End: 2025-07-15
Payer: MEDICARE

## 2025-07-15 PROCEDURE — G0422 INTENS CARDIAC REHAB W/EXERC: HCPCS

## 2025-07-15 PROCEDURE — G0423 INTENS CARDIAC REHAB NO EXER: HCPCS

## 2025-07-17 ENCOUNTER — HOSPITAL ENCOUNTER (OUTPATIENT)
Dept: CARDIAC REHAB | Age: 75
Setting detail: THERAPIES SERIES
Discharge: HOME | End: 2025-07-17
Payer: MEDICARE

## 2025-07-17 PROCEDURE — G0422 INTENS CARDIAC REHAB W/EXERC: HCPCS

## 2025-07-17 PROCEDURE — G0423 INTENS CARDIAC REHAB NO EXER: HCPCS

## 2025-07-25 ENCOUNTER — INITIAL CONSULT (OUTPATIENT)
Age: 75
End: 2025-07-25

## 2025-07-25 ENCOUNTER — TELEPHONE (OUTPATIENT)
Dept: CARDIOLOGY CLINIC | Age: 75
End: 2025-07-25

## 2025-07-25 VITALS
HEIGHT: 63 IN | DIASTOLIC BLOOD PRESSURE: 76 MMHG | BODY MASS INDEX: 26.01 KG/M2 | SYSTOLIC BLOOD PRESSURE: 124 MMHG | HEART RATE: 64 BPM | WEIGHT: 146.8 LBS

## 2025-07-25 DIAGNOSIS — I48.0 PAROXYSMAL ATRIAL FIBRILLATION (HCC): Primary | ICD-10-CM

## 2025-07-25 NOTE — TELEPHONE ENCOUNTER
Pt wants dr alexander to know she is not taking the amiodarone 200 mg but she is taking amlodipine 2.5 mg

## 2025-07-25 NOTE — TELEPHONE ENCOUNTER
Called patient and advised per Dr Paez  that amiodarone was taken off patients medication list. Continue with monitor. Patient stated understanding. No other c/o noted.

## 2025-07-30 ENCOUNTER — OFFICE VISIT (OUTPATIENT)
Dept: CARDIOLOGY CLINIC | Age: 75
End: 2025-07-30
Payer: MEDICARE

## 2025-07-30 VITALS
HEIGHT: 63 IN | WEIGHT: 145.2 LBS | HEART RATE: 60 BPM | BODY MASS INDEX: 25.73 KG/M2 | DIASTOLIC BLOOD PRESSURE: 70 MMHG | SYSTOLIC BLOOD PRESSURE: 140 MMHG

## 2025-07-30 DIAGNOSIS — I38 VHD (VALVULAR HEART DISEASE): Primary | ICD-10-CM

## 2025-07-30 PROCEDURE — G8419 CALC BMI OUT NRM PARAM NOF/U: HCPCS | Performed by: NURSE PRACTITIONER

## 2025-07-30 PROCEDURE — G8427 DOCREV CUR MEDS BY ELIG CLIN: HCPCS | Performed by: NURSE PRACTITIONER

## 2025-07-30 PROCEDURE — 1123F ACP DISCUSS/DSCN MKR DOCD: CPT | Performed by: NURSE PRACTITIONER

## 2025-07-30 PROCEDURE — 99213 OFFICE O/P EST LOW 20 MIN: CPT | Performed by: NURSE PRACTITIONER

## 2025-07-30 PROCEDURE — 1160F RVW MEDS BY RX/DR IN RCRD: CPT | Performed by: NURSE PRACTITIONER

## 2025-07-30 PROCEDURE — 3077F SYST BP >= 140 MM HG: CPT | Performed by: NURSE PRACTITIONER

## 2025-07-30 PROCEDURE — 1159F MED LIST DOCD IN RCRD: CPT | Performed by: NURSE PRACTITIONER

## 2025-07-30 PROCEDURE — 3017F COLORECTAL CA SCREEN DOC REV: CPT | Performed by: NURSE PRACTITIONER

## 2025-07-30 PROCEDURE — 1090F PRES/ABSN URINE INCON ASSESS: CPT | Performed by: NURSE PRACTITIONER

## 2025-07-30 PROCEDURE — G8400 PT W/DXA NO RESULTS DOC: HCPCS | Performed by: NURSE PRACTITIONER

## 2025-07-30 PROCEDURE — 3078F DIAST BP <80 MM HG: CPT | Performed by: NURSE PRACTITIONER

## 2025-07-30 PROCEDURE — 1036F TOBACCO NON-USER: CPT | Performed by: NURSE PRACTITIONER

## 2025-07-30 RX ORDER — METOPROLOL TARTRATE 75 MG/1
75 TABLET ORAL 2 TIMES DAILY
Qty: 180 TABLET | Refills: 3 | Status: SHIPPED | OUTPATIENT
Start: 2025-07-30

## 2025-07-30 RX ORDER — ATORVASTATIN CALCIUM 40 MG/1
40 TABLET, FILM COATED ORAL NIGHTLY
Qty: 90 TABLET | Refills: 3 | Status: SHIPPED | OUTPATIENT
Start: 2025-07-30

## 2025-07-30 RX ORDER — AMLODIPINE BESYLATE 2.5 MG/1
2.5 TABLET ORAL DAILY
Qty: 90 TABLET | Refills: 3 | Status: SHIPPED | OUTPATIENT
Start: 2025-07-30

## 2025-07-30 ASSESSMENT — ENCOUNTER SYMPTOMS
ORTHOPNEA: 0
SHORTNESS OF BREATH: 0

## 2025-07-30 NOTE — PROGRESS NOTES
7/30/2025  Primary cardiologist: Dr. Solomon    CC:   Rachel  is an established 75 y.o.  female here for a follow up on BELLE      SUBJECTIVE/OBJECTIVE:  Rachel is a 75 y.o. female with a history of coronary artery disease s/p CABG x  3 with left radial artery to LAD, SVG to Diagonal, and SVG to OM1 and FLAKO ligation with 45mm ATRICLIP (01/2025), postoperative atrial fibrillation,  VHD- AI/MR, hypertension, hyperlipidemia hypothyroidism,   BELLE shows incomplete ligation of left atrial appendage    HPI:  Rachel here to review her recent BELLE.  She has no cp or shortness of breath.  She is currently wearing 30-day event monitor per EP's request.    Review of Systems   Constitutional: Negative for diaphoresis and malaise/fatigue.   Cardiovascular:  Negative for chest pain, claudication, dyspnea on exertion, irregular heartbeat, leg swelling, near-syncope, orthopnea, palpitations and paroxysmal nocturnal dyspnea.   Respiratory:  Negative for shortness of breath.    Neurological:  Negative for dizziness and light-headedness.       Vitals:    07/30/25 1047   BP: (!) 140/70   BP Site: Left Upper Arm   Patient Position: Sitting   BP Cuff Size: Medium Adult   Pulse: 60   Weight: 65.9 kg (145 lb 3.2 oz)   Height: 1.6 m (5' 3\")     Wt Readings from Last 3 Encounters:   07/30/25 65.9 kg (145 lb 3.2 oz)   07/25/25 66.6 kg (146 lb 12.8 oz)   07/09/25 65.3 kg (144 lb)      Body mass index is 25.72 kg/m².     Physical Exam  Vitals reviewed.   Eyes:      Pupils: Pupils are equal, round, and reactive to light.   Cardiovascular:      Rate and Rhythm: Normal rate and regular rhythm.      Pulses: Normal pulses.      Heart sounds: Murmur heard.      Systolic murmur is present with a grade of 2/6.   Pulmonary:      Effort: Pulmonary effort is normal.      Breath sounds: No rales.   Musculoskeletal:      Right lower leg: No edema.      Left lower leg: No edema.   Skin:     General: Skin is warm and dry.      Capillary Refill: Capillary refill

## 2025-07-30 NOTE — PROGRESS NOTES
CLINICAL STAFF DOCUMENTATION    Jennifer Travis, CHRISSIE     Rachel Mcdaniel  1950  [id number]    Have you had any Chest Pain recently? - No    Have you had any Shortness of Breath - No    Have you had any dizziness - No    Have you had any palpitations recently? - No    Do you have any edema - swelling in No        When did you have your last labs drawn 04/2025  What doctor ordered Juanito     Do you need any prescriptions refilled? - No    Do you have a surgery or procedure scheduled in the near future - No      Do use tobacco products? - No  Do you drink alcohol? - No  Do you use any illicit drugs? - No  Caffeine? - OCC      Check medication list thoroughly!!! AND RECONCILE OUTSIDE MEDICATIONS  If dose has changed change the entire order not just the MG  BE SURE TO ASK PATIENT IF THEY NEED MEDICATION REFILLS  Verify Pharmacy and update if incorrect    Add to every patient's \"wrap up\" the following dot phrase AFTERVISITCARDIOHEARTHOUSE and ensure we explain this to our patients

## 2025-07-30 NOTE — PATIENT INSTRUCTIONS
Thank you for allowing us to care for you today!   We want to ensure we can follow your treatment plan and we strive to give you the best outcomes and experience possible.   If you ever have a life threatening emergency and call 911 - for an ambulance (EMS)  REMEMBER  Our providers can only care for you at:   [facility] or [facility]   Even if you have someone take you or you drive yourself we can only care for you in a [facility] facility. Our providers are not setup at the other healthcare locations!    PLEASE CALL OUR OFFICE DURING NORMAL BUSINESS HOURS  Monday through Friday 8 am to 5 pm  AFTER HOURS the physician on-call cannot help with scheduling, rescheduling, procedure instruction questions or any type of medication need or issue.  [facility] P:[phone] - [facility] P:[phone] - [facility] P:[phone]      If you receive a survey:  We would appreciate you taking the time to share your experience concerning your provider visit in the office.    These surveys are confidential!  We are eager to improve and are counting on you to share your feedback so we can ensure you get the best care possible.

## 2025-08-20 ENCOUNTER — TELEPHONE (OUTPATIENT)
Dept: CARDIOLOGY CLINIC | Age: 75
End: 2025-08-20

## 2025-08-20 DIAGNOSIS — I48.0 PAROXYSMAL ATRIAL FIBRILLATION (HCC): Primary | ICD-10-CM

## (undated) DEVICE — SWAN-GANZ CCOMBO V THERMODILUTION CATHETER: Brand: SWAN-GANZ CCOMBO V

## (undated) DEVICE — STOPCOCK IV HI PRSS 1050PSI NDLLSS INJ 3 W LUER SWVL NUT

## (undated) DEVICE — GLIDESHEATH SLENDER ACCESS KIT: Brand: GLIDESHEATH SLENDER

## (undated) DEVICE — GUIDEWIRE VASC L260CM DIA0.035IN RAD 3MM J TIP L7CM PTFE

## (undated) DEVICE — AVID DUAL STAGE VENOUS DRAINAGE CANNULA: Brand: AVID DUAL STAGE VENOUS DRAINAGE CANNULA

## (undated) DEVICE — CANNULA PERF L5.5IN DIA9FR AORT ROOT AG STD TIP W/ VENT LN

## (undated) DEVICE — CONNECTOR DRNGE W3/8-0.5XH3/16XL3/16IN 2:1 SIL CARD STR

## (undated) DEVICE — GLOVE SURG SZ 8 L12IN THK75MIL DK GRN LTX FREE

## (undated) DEVICE — OPEN HEART B: Brand: MEDLINE INDUSTRIES, INC.

## (undated) DEVICE — KIT INTRO 9FR L4IN POLYUR PERC SHTH RADPQ W INTEGR HEMSTAS

## (undated) DEVICE — BAND COMPR L24CM REG CLR PLAS HEMSTAT EXT HK AND LOOP RETEN

## (undated) DEVICE — CONTAINER,SPECIMEN,OR STERILE,4OZ: Brand: MEDLINE

## (undated) DEVICE — MEDI-TRACE CADENCE ADULT, DEFIBRILLATION ELECTRODE -RTS  (10 PR/PK) - ZOLL: Brand: MEDI-TRACE CADENCE

## (undated) DEVICE — SUTURE N ABSRB BRAIDED 5-0 60 IN TIE BLK PERMA HND SA11G

## (undated) DEVICE — KIT MICROINTRODUCER 4FR ECHOGENIC NDL L7CM 21GA STIFF COAX

## (undated) DEVICE — KIT COMPL CK0289R4  BB9L99R8

## (undated) DEVICE — SOLUTION PREP PAINT POV IOD FOR SKIN MUCOUS MEM

## (undated) DEVICE — APPLICATOR PREP 26ML 0.7% IOD POVACRYLEX 74% ISO ALC ST

## (undated) DEVICE — SYRINGE MED 10ML LUERLOCK TIP W/O SFTY DISP

## (undated) DEVICE — ELECTRODE ES L2.75IN S STL INSUL BLDE W/ SL EDGE

## (undated) DEVICE — TUBING, SUCTION, 9/32" X 10', STRAIGHT: Brand: MEDLINE

## (undated) DEVICE — CLIP SM RED INTERN HMOCLP TITAN LIGATING

## (undated) DEVICE — Device

## (undated) DEVICE — LEAD PACE L475MM CHNL A OR V MYOCARDIAL STEROID ELUT SIL

## (undated) DEVICE — TOWEL,OR,DSP,ST,WHITE,DLX,XR,4/PK,20PK/C: Brand: MEDLINE

## (undated) DEVICE — MARKER SURG SKIN UTIL REGULAR/FINE 2 TIP W/ RUL AND 9 LBL

## (undated) DEVICE — EZ GLIDE AORTIC CANNULA: Brand: EDWARDS LIFESCIENCES EZ GLIDE AORTIC CANNULA

## (undated) DEVICE — ROTATING SURGICAL PUNCHES, 1 PER POUCH: Brand: A&E MEDICAL / ROTATING SURGICAL PUNCHES

## (undated) DEVICE — CATHETER THORACENTESIS STR 28 FRX23 IN 6 EYELET TAPR TIP LF - SEE COMMENT

## (undated) DEVICE — DECANTER BAG 9": Brand: MEDLINE INDUSTRIES, INC.

## (undated) DEVICE — ADAPTER IV TBNG CLR POLYCARB DBL M LUERLOCK

## (undated) DEVICE — AGENT HEMOSTATIC SURGIFLOW MATRIX KIT W/THROMBIN

## (undated) DEVICE — CATHETER DIAG AD 4FR L110CM 145DEG COR RED HYDRPHLC NYL

## (undated) DEVICE — 1LYRTR 16FR10ML100%SILTMPS SNP: Brand: MEDLINE INDUSTRIES, INC.

## (undated) DEVICE — SYSTEM ENDOSCP VES HARV W/ TOOL CANN SEAL SHT PRT BLNT TIP

## (undated) DEVICE — ANGIOGRAPHY KIT CUST MANIFOLD

## (undated) DEVICE — DRAPE,UTILITY,XL,4/PK,STERILE: Brand: MEDLINE

## (undated) DEVICE — SUTURE PROL SZ 7-0 L30IN NONABSORBABLE BLU L9.3MM BV-1 1/2 8703H

## (undated) DEVICE — SUTURE ABSORBABLE BRAIDED 2-0 CT-1 27 IN UD VICRYL J259H

## (undated) DEVICE — SUTURE NRLN SZ 1 L18IN NONABSORBABLE BLK L36MM CT-1 1/2 CIR C520D

## (undated) DEVICE — PRESSURE TUBING: Brand: TRUWAVE

## (undated) DEVICE — Z DISCONTINUED USE 2220305 SUTURE VICRYL SZ 3-0 L27IN ABSRB UD L24MM PS-1 3/8 CIR PRIM J936H

## (undated) DEVICE — AGENT HEMOSTATIC SURG ORIGINAL ABS 2X14IN LOOSE KNIT 12/CA

## (undated) DEVICE — SYRINGE MED 30ML STD CLR PLAS LUERLOCK TIP N CTRL DISP

## (undated) DEVICE — DRAIN SURG 19FR 100% SIL RADPQ RND CHN FULL FLUT

## (undated) DEVICE — GLOVE ORANGE PI 8   MSG9080

## (undated) DEVICE — SUTURE NONABSORBABLE MONOFILAMENT 4-0 RB-1 36 IN BLU PROLENE 8557H

## (undated) DEVICE — SUTURE PROL SZ 4-0 L36IN NONABSORBABLE BLU L26MM SH 1/2 CIR 8521H

## (undated) DEVICE — CONNECTOR IV 3/8X3/8X3/8 Y

## (undated) DEVICE — NEEDLE ANGIO L1IN DIA21GA 1 THN WALL SMOOTH STD HUB

## (undated) DEVICE — RETRACTOR SURG INSRT SUT HLD OCTOBASE

## (undated) DEVICE — STRIP SKIN CLSR W0.25XL4IN WHT SPUNBOUND FBR NYL HI ADH

## (undated) DEVICE — RETROGRADE CARDIOPLEGIA CATHETER: Brand: EDWARDS LIFESCIENCES RETROGRADE CARDIOPLEGIA CATHETER

## (undated) DEVICE — DRAIN SURG L3/8-1/2IN DIA3/16IN SIL CARD CONN 1:1 BLAK

## (undated) DEVICE — PLEDGET VASC W3/16XL3/8IN THK1/16IN PTFE SFT

## (undated) DEVICE — SUTURE PROL SZ 6-0 L30IN NONABSORBABLE BLU L13MM RB-2 1/2 8711H

## (undated) DEVICE — SUTURE VICRYL SZ 0 L36IN ABSRB UD L36MM CT-1 1/2 CIR J946H

## (undated) DEVICE — PROVE COVER: Brand: UNBRANDED

## (undated) DEVICE — DISK-SHAPED STYLE, SILICONE (1 PER STERILE PKG): Brand: SCANLAN® RADIOMARK® GRAFT MARKERS

## (undated) DEVICE — CANNULA VENT 16FR MAL INTRO SIL CONN 0.25IN NVENT BULL TIP

## (undated) DEVICE — OPEN HEART A: Brand: MEDLINE INDUSTRIES, INC.

## (undated) DEVICE — RADIFOCUS OPTITORQUE ANGIOGRAPHIC CATHETER: Brand: OPTITORQUE

## (undated) DEVICE — SUTURE PDS II SZ 0 L36IN ABSRB VLT L36MM CT-1 1/2 CIR Z346H

## (undated) DEVICE — GUIDE SURG SELECTION FOR GILLINOV COSGROVE LAA EXCLUSION SYS

## (undated) DEVICE — SPONGE LAP W18XL18IN WHT COT 4 PLY FLD STRUNG RADPQ DISP ST 2 PER PACK

## (undated) DEVICE — CATHETER THOR 36FR L23CM DIA12MM POLYVI CHL TAPR CONN TIP

## (undated) DEVICE — SET PERF L15IN BLU CLMP MULT FEM LUER ON SGL INLET LEG DLP

## (undated) DEVICE — SUTURE PROL SZ 7-0 L30IN NONABSORBABLE BLU L9.3MM BV-1 3/8 M8703

## (undated) DEVICE — DRAIN SURG SGL COLL PT TB FOR ATS BG OASIS

## (undated) DEVICE — DEVICE BLOWER/MISTER AXIUS

## (undated) DEVICE — TUBING INSUFFLATOR HEAT HI FLO SET PNEUMOCLEAR

## (undated) DEVICE — GAUZE,SPONGE,4"X4",8PLY,STRL,LF,10/TRAY: Brand: MEDLINE

## (undated) DEVICE — 3M™ IOBAN™ 2 ANTIMICROBIAL INCISE DRAPE 6648EZ: Brand: IOBAN™ 2

## (undated) DEVICE — BLANKET WRM W35.4XL86.6IN FULL UNDERBODY + FORC AIR

## (undated) DEVICE — SYRINGE 20ML LL S/C 50

## (undated) DEVICE — CLIP LIG M BLU TI HRT SHP WIRE HORZ 24 CLIPS/PK 25PK/CA

## (undated) DEVICE — CATHETER,SUCTION,14FR,WHISTL,STR PK,CAL: Brand: MEDLINE

## (undated) DEVICE — 3M™ STERI-DRAPE™ INSTRUMENT POUCH 1018: Brand: STERI-DRAPE™

## (undated) DEVICE — SUTURE PROL SZ 3-0 L36IN NONABSORBABLE BLU L26MM SH 1/2 CIR 8522H

## (undated) DEVICE — SUTURE VICRYL SZ 3-0 L36IN ABSRB UD L36MM CT-1 1/2 CIR J944H

## (undated) DEVICE — SUTURE PROL SZ 7-0 L24IN NONABSORBABLE BLU L8MM BV175-6 3/8 8735H

## (undated) DEVICE — Device: Brand: VIRTUOSAPH PLUS WITH RADIAL INDICATION

## (undated) DEVICE — CATHETER CTRL VEN L20CM OD5FR .032IN GWIRE KT SGL LUMN N AK04210] TELEFLEX ARROW INTL INC]